# Patient Record
Sex: MALE | Race: WHITE | NOT HISPANIC OR LATINO | Employment: OTHER | URBAN - METROPOLITAN AREA
[De-identification: names, ages, dates, MRNs, and addresses within clinical notes are randomized per-mention and may not be internally consistent; named-entity substitution may affect disease eponyms.]

---

## 2017-03-10 DIAGNOSIS — E11.9 TYPE 2 DIABETES MELLITUS WITHOUT COMPLICATION: ICD-10-CM

## 2017-04-20 ENCOUNTER — PATIENT OUTREACH (OUTPATIENT)
Dept: ADMINISTRATIVE | Facility: HOSPITAL | Age: 72
End: 2017-04-20

## 2017-04-20 NOTE — PROGRESS NOTES
SCHEDULED ANNUAL EXAM  WITH DR MENDOZA 06/5/17 9:30.LAB ORDERS INPLACE. PT VERBALIZED UNDERSTANDING.

## 2017-04-25 ENCOUNTER — OFFICE VISIT (OUTPATIENT)
Dept: INTERNAL MEDICINE | Facility: CLINIC | Age: 72
End: 2017-04-25
Payer: MEDICARE

## 2017-04-25 VITALS
SYSTOLIC BLOOD PRESSURE: 119 MMHG | WEIGHT: 288.13 LBS | OXYGEN SATURATION: 96 % | HEIGHT: 69 IN | HEART RATE: 88 BPM | DIASTOLIC BLOOD PRESSURE: 70 MMHG | BODY MASS INDEX: 42.67 KG/M2 | TEMPERATURE: 98 F

## 2017-04-25 DIAGNOSIS — J06.9 UPPER RESPIRATORY TRACT INFECTION, UNSPECIFIED TYPE: Primary | ICD-10-CM

## 2017-04-25 PROCEDURE — 99213 OFFICE O/P EST LOW 20 MIN: CPT | Mod: S$PBB,,, | Performed by: FAMILY MEDICINE

## 2017-04-25 PROCEDURE — 99999 PR PBB SHADOW E&M-EST. PATIENT-LVL III: CPT | Mod: PBBFAC,,, | Performed by: FAMILY MEDICINE

## 2017-04-25 PROCEDURE — 99213 OFFICE O/P EST LOW 20 MIN: CPT | Mod: PBBFAC,PO | Performed by: FAMILY MEDICINE

## 2017-04-25 RX ORDER — PROMETHAZINE HYDROCHLORIDE AND CODEINE PHOSPHATE 6.25; 1 MG/5ML; MG/5ML
5 SOLUTION ORAL EVERY 4 HOURS PRN
Qty: 180 ML | Refills: 0 | Status: SHIPPED | OUTPATIENT
Start: 2017-04-25 | End: 2017-05-05

## 2017-04-25 NOTE — MR AVS SNAPSHOT
[facility]  [address] [address]  Phone: [phone]  Fax: [phone]                  Trip Prasad   2017 8:30 AM   Office Visit    Description:  Male : 1945   Provider:  Jose J Scott MD   Department:  [facility]           Reason for Visit     Nasal Congestion     Otalgia           Diagnoses this Visit        Comments    Upper respiratory tract infection, unspecified type    -  Primary            To Do List           Future Appointments        Provider Department Dept Phone    2017 9:30 AM Jose J Scott MD [facility] [phone]      Goals (5 Years of Data)     None       These Medications        Disp Refills Start End    promethazine-codeine 6.25-10 mg/5 ml (PHENERGAN WITH CODEINE) 6.25-10 mg/5 mL syrup 180 mL 0 2017    Take 5 mLs by mouth every 4 (four) hours as needed for Cough. - Oral    Pharmacy: [id number] [address] #: [phone]         [facility]     [facility] On Call Nurse Care Line -  Assistance  Unless otherwise directed by your provider, please contact [facility], our nurse care line that is available for  assistance.     Registered nurses in the [facility] provide: appointment scheduling, clinical advisement, health education, and other advisory services.  Call: [phone] (toll free)               Medications           Message regarding Medications     Verify the changes and/or additions to your medication regime listed below are the same as discussed with your clinician today.  If any of these changes or additions are incorrect, please notify your healthcare provider.        START taking these NEW medications        Refills    promethazine-codeine 6.25-10 mg/5 ml (PHENERGAN WITH CODEINE) 6.25-10 mg/5 mL syrup 0    Sig: Take 5 mLs by mouth every 4 (four) hours as needed for Cough.    Class: Print    Route: Oral     "  STOP taking these medications     albuterol 90 mcg/actuation inhaler Inhale 2 puffs into the lungs every 6 (six) hours as needed for Wheezing.    hydrocodone-acetaminophen 10-325mg (NORCO)  mg Tab Take 1 tablet by mouth as needed.    senna (SENOKOT) 8.6 mg tablet Take 1 tablet by mouth as needed for Constipation.           Verify that the below list of medications is an accurate representation of the medications you are currently taking.  If none reported, the list may be blank. If incorrect, please contact your healthcare provider. Carry this list with you in case of emergency.           Current Medications     aspirin (ECOTRIN) 81 MG EC tablet Take 81 mg by mouth once daily.    blood sugar diagnostic Strp 1 strip by Misc.(Non-Drug; Combo Route) route once daily.    brinzolamide-brimonidine 1-0.2 % DrpS Apply to eye.    promethazine-codeine 6.25-10 mg/5 ml (PHENERGAN WITH CODEINE) 6.25-10 mg/5 mL syrup Take 5 mLs by mouth every 4 (four) hours as needed for Cough.    triamcinolone acetonide 0.1% (KENALOG) 0.1 % cream Apply topically 2 (two) times daily.           Clinical Reference Information           Your Vitals Were     BP Pulse Temp Height Weight SpO2    119/70 88 97.9 °F (36.6 °C) (Oral) 5' 9" (1.753 m) 130.7 kg (288 lb 2.3 oz) 96%    BMI                42.55 kg/m2          Blood Pressure          Most Recent Value    BP  119/70      Allergies as of 4/25/2017     Biaxin [Clarithromycin]    Pcn [Penicillins]    Metformin      Immunizations Administered on Date of Encounter - 4/25/2017     None      [facility] Sign-Up     Activating your [facility] account is as easy as 1-2-3!     1) Visit [email or web address], select Sign Up Now, enter this activation code and your date of birth, then select Next.  [email or web address]  Expires: 6/9/2017  9:21 AM      2) Create a username and password to use when you visit [facility] in the future and select a security question in case you lose your password and select Next.    3) " Enter your e-mail address and click Sign Up!    Additional Information  If you have questions, please e-mail [email or web address] or call [phone] to talk to our [facility] staff. Remember, [facility] is NOT to be used for urgent needs. For medical emergencies, dial 911.         Language Assistance Services     ATTENTION: Language assistance services are available, free of charge. Please call [phone].      ATENCIÓN: Si habla español, tiene a thornton disposición servicios gratuitos de asistencia lingüística. Llame al [phone].     [facility] Ý: N?u b?n nói Ti?ng Vi?t, có các d?ch v? h? tr? ngôn ng? mi?n phí dành cho b?n. G?i s? [phone].         [facility] complies with applicable Federal civil rights laws and does not discriminate on the basis of race, color, national origin, age, disability, or sex.

## 2017-04-25 NOTE — PROGRESS NOTES
Subjective:       Patient ID: Trip Prasad is a 71 y.o. male.    Chief Complaint: Nasal Congestion and Otalgia    HPI Comments: 4 days duration of upper respiratory infection to include nasal congestion postnasal drip and nonproductive cough.  Denies fever chills.  Denies sinus pressure.    Otalgia    Associated symptoms include coughing. Pertinent negatives include no diarrhea or sore throat.     Review of Systems   Constitutional: Negative for chills and fever.   HENT: Positive for congestion, ear pain and postnasal drip. Negative for sinus pressure and sore throat.    Respiratory: Positive for cough. Negative for shortness of breath and wheezing.    Cardiovascular: Negative for chest pain and palpitations.   Gastrointestinal: Negative for diarrhea and nausea.       Objective:      Physical Exam   Constitutional: He appears well-developed and well-nourished.   HENT:   Right Ear: External ear normal.   Left Ear: External ear normal.   Nasal congestion   Eyes: Conjunctivae are normal.   Neck: Neck supple. No thyromegaly present.   Cardiovascular: Normal rate, regular rhythm and normal heart sounds.    No murmur heard.  Pulmonary/Chest: Effort normal and breath sounds normal. No respiratory distress. He has no wheezes. He has no rales.   Abdominal: Soft. Bowel sounds are normal. He exhibits no mass. There is no tenderness.   Lymphadenopathy:     He has no cervical adenopathy.   Neurological: He is alert.       Office Visit on 09/12/2016   Component Date Value Ref Range Status    POC Glucose 09/12/2016 140* 70 - 110 mg/dL Final     Assessment:       1. Upper respiratory tract infection, unspecified type        Plan:     Promethazine with codeine, fluids and rest.    Upper respiratory tract infection, unspecified type  -     promethazine-codeine 6.25-10 mg/5 ml (PHENERGAN WITH CODEINE) 6.25-10 mg/5 mL syrup; Take 5 mLs by mouth every 4 (four) hours as needed for Cough.  Dispense: 180 mL; Refill: 0

## 2017-11-17 DIAGNOSIS — E11.9 TYPE 2 DIABETES MELLITUS WITHOUT COMPLICATION: ICD-10-CM

## 2018-01-10 ENCOUNTER — HOSPITAL ENCOUNTER (INPATIENT)
Facility: HOSPITAL | Age: 73
LOS: 3 days | Discharge: HOME | DRG: 193 | End: 2018-01-14
Attending: EMERGENCY MEDICINE | Admitting: EMERGENCY MEDICINE
Payer: MEDICARE

## 2018-01-10 DIAGNOSIS — R06.02 SOB (SHORTNESS OF BREATH): ICD-10-CM

## 2018-01-10 DIAGNOSIS — I48.91 A-FIB: ICD-10-CM

## 2018-01-10 DIAGNOSIS — J18.9 PNEUMONIA OF LEFT LOWER LOBE DUE TO INFECTIOUS ORGANISM: Primary | ICD-10-CM

## 2018-01-10 DIAGNOSIS — I95.9 HYPOTENSION, UNSPECIFIED HYPOTENSION TYPE: ICD-10-CM

## 2018-01-10 DIAGNOSIS — R06.09 DOE (DYSPNEA ON EXERTION): ICD-10-CM

## 2018-01-10 DIAGNOSIS — I48.91 NEW ONSET A-FIB: ICD-10-CM

## 2018-01-10 DIAGNOSIS — I48.0 PAROXYSMAL ATRIAL FIBRILLATION: ICD-10-CM

## 2018-01-10 PROBLEM — J11.1 INFLUENZA: Status: ACTIVE | Noted: 2018-01-10

## 2018-01-10 LAB
ALBUMIN SERPL BCP-MCNC: 3.2 G/DL
ALLENS TEST: ABNORMAL
ALP SERPL-CCNC: 86 U/L
ALT SERPL W/O P-5'-P-CCNC: 34 U/L
ANION GAP SERPL CALC-SCNC: 12 MMOL/L
AST SERPL-CCNC: 35 U/L
BACTERIA #/AREA URNS HPF: NORMAL /HPF
BASOPHILS # BLD AUTO: 0.01 K/UL
BASOPHILS NFR BLD: 0.2 %
BILIRUB SERPL-MCNC: 0.6 MG/DL
BILIRUB UR QL STRIP: NEGATIVE
BNP SERPL-MCNC: <10 PG/ML
BUN SERPL-MCNC: 19 MG/DL
CALCIUM SERPL-MCNC: 8.9 MG/DL
CHLORIDE SERPL-SCNC: 100 MMOL/L
CK SERPL-CCNC: 184 U/L
CLARITY UR: CLEAR
CO2 SERPL-SCNC: 25 MMOL/L
COLOR UR: YELLOW
CREAT SERPL-MCNC: 1.3 MG/DL
DELSYS: ABNORMAL
DIFFERENTIAL METHOD: ABNORMAL
EOSINOPHIL # BLD AUTO: 0 K/UL
EOSINOPHIL NFR BLD: 0.2 %
ERYTHROCYTE [DISTWIDTH] IN BLOOD BY AUTOMATED COUNT: 14.6 %
EST. GFR  (AFRICAN AMERICAN): >60 ML/MIN/1.73 M^2
EST. GFR  (NON AFRICAN AMERICAN): 55 ML/MIN/1.73 M^2
FIO2: 21
GLUCOSE SERPL-MCNC: 204 MG/DL
GLUCOSE UR QL STRIP: NEGATIVE
HCO3 UR-SCNC: 22.8 MMOL/L (ref 24–28)
HCT VFR BLD AUTO: 44.2 %
HGB BLD-MCNC: 14.7 G/DL
HGB UR QL STRIP: ABNORMAL
HYALINE CASTS #/AREA URNS LPF: 0 /LPF
KETONES UR QL STRIP: NEGATIVE
LACTATE SERPL-SCNC: 1 MMOL/L
LACTATE SERPL-SCNC: 1.3 MMOL/L
LEUKOCYTE ESTERASE UR QL STRIP: NEGATIVE
LYMPHOCYTES # BLD AUTO: 1.8 K/UL
LYMPHOCYTES NFR BLD: 26.9 %
MCH RBC QN AUTO: 28.8 PG
MCHC RBC AUTO-ENTMCNC: 33.3 G/DL
MCV RBC AUTO: 87 FL
MICROSCOPIC COMMENT: NORMAL
MODE: ABNORMAL
MONOCYTES # BLD AUTO: 0.5 K/UL
MONOCYTES NFR BLD: 8.2 %
NEUTROPHILS # BLD AUTO: 4.2 K/UL
NEUTROPHILS NFR BLD: 64.5 %
NITRITE UR QL STRIP: NEGATIVE
PCO2 BLDA: 34 MMHG (ref 35–45)
PH SMN: 7.43 [PH] (ref 7.35–7.45)
PH UR STRIP: 6 [PH] (ref 5–8)
PLATELET # BLD AUTO: 140 K/UL
PMV BLD AUTO: 9.8 FL
PO2 BLDA: 65 MMHG (ref 80–100)
POC BE: -1 MMOL/L
POC SATURATED O2: 93 % (ref 95–100)
POCT GLUCOSE: 171 MG/DL (ref 70–110)
POTASSIUM SERPL-SCNC: 3.5 MMOL/L
PROT SERPL-MCNC: 7.2 G/DL
PROT UR QL STRIP: ABNORMAL
RBC # BLD AUTO: 5.11 M/UL
RBC #/AREA URNS HPF: 0 /HPF (ref 0–4)
SAMPLE: ABNORMAL
SITE: ABNORMAL
SODIUM SERPL-SCNC: 137 MMOL/L
SP GR UR STRIP: 1.02 (ref 1–1.03)
TROPONIN I SERPL DL<=0.01 NG/ML-MCNC: <0.006 NG/ML
URN SPEC COLLECT METH UR: ABNORMAL
UROBILINOGEN UR STRIP-ACNC: NEGATIVE EU/DL
WBC # BLD AUTO: 6.5 K/UL
WBC #/AREA URNS HPF: 2 /HPF (ref 0–5)

## 2018-01-10 PROCEDURE — 25000003 PHARM REV CODE 250: Performed by: EMERGENCY MEDICINE

## 2018-01-10 PROCEDURE — G0378 HOSPITAL OBSERVATION PER HR: HCPCS

## 2018-01-10 PROCEDURE — 83880 ASSAY OF NATRIURETIC PEPTIDE: CPT

## 2018-01-10 PROCEDURE — 84484 ASSAY OF TROPONIN QUANT: CPT

## 2018-01-10 PROCEDURE — 93010 ELECTROCARDIOGRAM REPORT: CPT | Mod: ,,, | Performed by: INTERNAL MEDICINE

## 2018-01-10 PROCEDURE — 25000003 PHARM REV CODE 250: Performed by: HOSPITALIST

## 2018-01-10 PROCEDURE — 63600175 PHARM REV CODE 636 W HCPCS: Performed by: FAMILY MEDICINE

## 2018-01-10 PROCEDURE — 82962 GLUCOSE BLOOD TEST: CPT

## 2018-01-10 PROCEDURE — 99285 EMERGENCY DEPT VISIT HI MDM: CPT

## 2018-01-10 PROCEDURE — 96365 THER/PROPH/DIAG IV INF INIT: CPT

## 2018-01-10 PROCEDURE — 93005 ELECTROCARDIOGRAM TRACING: CPT

## 2018-01-10 PROCEDURE — 83605 ASSAY OF LACTIC ACID: CPT | Mod: 91

## 2018-01-10 PROCEDURE — 63600175 PHARM REV CODE 636 W HCPCS: Performed by: EMERGENCY MEDICINE

## 2018-01-10 PROCEDURE — 25000242 PHARM REV CODE 250 ALT 637 W/ HCPCS: Performed by: EMERGENCY MEDICINE

## 2018-01-10 PROCEDURE — 25000242 PHARM REV CODE 250 ALT 637 W/ HCPCS: Performed by: HOSPITALIST

## 2018-01-10 PROCEDURE — 36600 WITHDRAWAL OF ARTERIAL BLOOD: CPT

## 2018-01-10 PROCEDURE — 82803 BLOOD GASES ANY COMBINATION: CPT

## 2018-01-10 PROCEDURE — 99900035 HC TECH TIME PER 15 MIN (STAT)

## 2018-01-10 PROCEDURE — 82550 ASSAY OF CK (CPK): CPT

## 2018-01-10 PROCEDURE — 27000221 HC OXYGEN, UP TO 24 HOURS

## 2018-01-10 PROCEDURE — 85025 COMPLETE CBC W/AUTO DIFF WBC: CPT

## 2018-01-10 PROCEDURE — 94640 AIRWAY INHALATION TREATMENT: CPT

## 2018-01-10 PROCEDURE — 80053 COMPREHEN METABOLIC PANEL: CPT

## 2018-01-10 PROCEDURE — 81000 URINALYSIS NONAUTO W/SCOPE: CPT

## 2018-01-10 PROCEDURE — 87040 BLOOD CULTURE FOR BACTERIA: CPT | Mod: 59

## 2018-01-10 PROCEDURE — 83605 ASSAY OF LACTIC ACID: CPT

## 2018-01-10 PROCEDURE — 36415 COLL VENOUS BLD VENIPUNCTURE: CPT

## 2018-01-10 PROCEDURE — 25000003 PHARM REV CODE 250: Performed by: FAMILY MEDICINE

## 2018-01-10 RX ORDER — IBUPROFEN 200 MG
16 TABLET ORAL
Status: DISCONTINUED | OUTPATIENT
Start: 2018-01-10 | End: 2018-01-14 | Stop reason: HOSPADM

## 2018-01-10 RX ORDER — IPRATROPIUM BROMIDE AND ALBUTEROL SULFATE 2.5; .5 MG/3ML; MG/3ML
3 SOLUTION RESPIRATORY (INHALATION)
Status: COMPLETED | OUTPATIENT
Start: 2018-01-10 | End: 2018-01-10

## 2018-01-10 RX ORDER — ONDANSETRON 2 MG/ML
4 INJECTION INTRAMUSCULAR; INTRAVENOUS EVERY 12 HOURS PRN
Status: DISCONTINUED | OUTPATIENT
Start: 2018-01-10 | End: 2018-01-14 | Stop reason: HOSPADM

## 2018-01-10 RX ORDER — SODIUM CHLORIDE 9 MG/ML
INJECTION, SOLUTION INTRAVENOUS CONTINUOUS
Status: DISCONTINUED | OUTPATIENT
Start: 2018-01-10 | End: 2018-01-11

## 2018-01-10 RX ORDER — IBUPROFEN 200 MG
24 TABLET ORAL
Status: DISCONTINUED | OUTPATIENT
Start: 2018-01-10 | End: 2018-01-14 | Stop reason: HOSPADM

## 2018-01-10 RX ORDER — MOXIFLOXACIN HYDROCHLORIDE 400 MG/250ML
400 INJECTION, SOLUTION INTRAVENOUS
Status: COMPLETED | OUTPATIENT
Start: 2018-01-10 | End: 2018-01-10

## 2018-01-10 RX ORDER — ACETAMINOPHEN 325 MG/1
650 TABLET ORAL EVERY 8 HOURS PRN
Status: DISCONTINUED | OUTPATIENT
Start: 2018-01-10 | End: 2018-01-14 | Stop reason: HOSPADM

## 2018-01-10 RX ORDER — IPRATROPIUM BROMIDE AND ALBUTEROL SULFATE 2.5; .5 MG/3ML; MG/3ML
3 SOLUTION RESPIRATORY (INHALATION) EVERY 4 HOURS PRN
Status: DISCONTINUED | OUTPATIENT
Start: 2018-01-10 | End: 2018-01-11

## 2018-01-10 RX ORDER — MOXIFLOXACIN HYDROCHLORIDE 400 MG/1
400 TABLET ORAL DAILY
Status: DISCONTINUED | OUTPATIENT
Start: 2018-01-11 | End: 2018-01-10

## 2018-01-10 RX ORDER — OSELTAMIVIR PHOSPHATE 30 MG/1
75 CAPSULE ORAL 2 TIMES DAILY
Status: ON HOLD | COMMUNITY
End: 2018-01-14 | Stop reason: HOSPADM

## 2018-01-10 RX ORDER — CEFTRIAXONE 1 G/1
1 INJECTION, POWDER, FOR SOLUTION INTRAMUSCULAR; INTRAVENOUS
Status: DISCONTINUED | OUTPATIENT
Start: 2018-01-10 | End: 2018-01-10 | Stop reason: SDUPTHER

## 2018-01-10 RX ORDER — GLUCAGON 1 MG
1 KIT INJECTION
Status: DISCONTINUED | OUTPATIENT
Start: 2018-01-10 | End: 2018-01-14 | Stop reason: HOSPADM

## 2018-01-10 RX ORDER — OSELTAMIVIR PHOSPHATE 75 MG/1
75 CAPSULE ORAL 2 TIMES DAILY
Status: COMPLETED | OUTPATIENT
Start: 2018-01-10 | End: 2018-01-14

## 2018-01-10 RX ORDER — INSULIN ASPART 100 [IU]/ML
0-5 INJECTION, SOLUTION INTRAVENOUS; SUBCUTANEOUS
Status: DISCONTINUED | OUTPATIENT
Start: 2018-01-10 | End: 2018-01-12

## 2018-01-10 RX ORDER — DOXYCYCLINE HYCLATE 100 MG
100 TABLET ORAL EVERY 12 HOURS
Status: DISCONTINUED | OUTPATIENT
Start: 2018-01-10 | End: 2018-01-13

## 2018-01-10 RX ORDER — MONTELUKAST SODIUM 10 MG/1
10 TABLET ORAL NIGHTLY
COMMUNITY
End: 2018-02-22

## 2018-01-10 RX ADMIN — OSELTAMIVIR PHOSPHATE 75 MG: 75 CAPSULE ORAL at 09:01

## 2018-01-10 RX ADMIN — MOXIFLOXACIN HYDROCHLORIDE 400 MG: 400 INJECTION, SOLUTION INTRAVENOUS at 05:01

## 2018-01-10 RX ADMIN — IPRATROPIUM BROMIDE AND ALBUTEROL SULFATE 3 ML: .5; 3 SOLUTION RESPIRATORY (INHALATION) at 09:01

## 2018-01-10 RX ADMIN — SODIUM CHLORIDE 3918 ML: 0.9 INJECTION, SOLUTION INTRAVENOUS at 03:01

## 2018-01-10 RX ADMIN — SODIUM CHLORIDE: 0.9 INJECTION, SOLUTION INTRAVENOUS at 09:01

## 2018-01-10 RX ADMIN — IPRATROPIUM BROMIDE AND ALBUTEROL SULFATE 3 ML: .5; 3 SOLUTION RESPIRATORY (INHALATION) at 03:01

## 2018-01-10 RX ADMIN — DOXYCYCLINE HYCLATE 100 MG: 100 TABLET, COATED ORAL at 09:01

## 2018-01-10 RX ADMIN — CEFTRIAXONE 1 G: 1 INJECTION, SOLUTION INTRAVENOUS at 09:01

## 2018-01-10 NOTE — ED PROVIDER NOTES
SCRIBE #1 NOTE: I, Breezy Adler, am scribing for, and in the presence of, Eduardo Potter MD. I have scribed the entire note.      History      Chief Complaint   Patient presents with    Shortness of Breath     dx flu 2 days ago. Reports fatigue, cough, congestion.        Review of patient's allergies indicates:   Allergen Reactions    Biaxin [clarithromycin] Other (See Comments)     Pt unsure     Pcn [penicillins]     Metformin Rash     Rash over body         HPI   HPI    1/10/2018, 2:48 PM   History obtained from the patient      History of Present Illness: Trip Prasad is a 72 y.o. male patient who presents to the Emergency Department for SOB which onset gradually 3 weeks ago. Symptoms are constant and moderate in severity. Pt was dx with bronchitis 3 weeks ago. Pt was prescribed an inhaler and nebulizer to help with the wheezing. The wheezing started to improved but never completely resolved. Pt was dx with flu yesterday by his PCP. No mitigating or exacerbating factors reported. Associated sxs include fatigue, cough, and congestion. Patient denies any fever, chills, n/v/d, CP, leg swelling, palpitations, sore throat, rhinorrhea, HA, and all other sxs at this time. No further complaints or concerns at this time.         Arrival mode: Personal vehicle     PCP: Caleb Leong MD       Past Medical History:  Past Medical History:   Diagnosis Date    Diabetes mellitus, type 2 1/2014    Glaucoma 2012    Hiatal hernia     Hyperlipidemia     Loss of hearing     Hearing aids 6/12 bilateral    Obesity (BMI 30-39.9)        Past Surgical History:  Past Surgical History:   Procedure Laterality Date    EYE SURGERY Bilateral 2013    Cataract    GLAUCOMA SURGERY  10/12    ROTATOR CUFF REPAIR Right 3/04    TONSILLECTOMY      TOTAL KNEE ARTHROPLASTY Left     Total right knee replacement Right 11/19/15    WISDOM TOOTH EXTRACTION           Family History:  Family History   Problem Relation Age of Onset     "Cancer Maternal Aunt     Heart disease Maternal Aunt     Heart disease Maternal Grandfather     Cancer Maternal Uncle        Social History:  Social History     Social History Main Topics    Smoking status: Never Smoker    Smokeless tobacco: Never Used    Alcohol use 0.0 oz/week      Comment: " very little"    Drug use: No    Sexual activity: Yes     Partners: Female       ROS   Review of Systems   Constitutional: Positive for fatigue. Negative for chills and fever.   HENT: Positive for congestion. Negative for rhinorrhea and sore throat.    Respiratory: Positive for cough, shortness of breath and wheezing.    Cardiovascular: Negative for chest pain, palpitations and leg swelling.   Gastrointestinal: Negative for diarrhea, nausea and vomiting.   Genitourinary: Negative for dysuria.   Musculoskeletal: Negative for back pain.   Skin: Negative for rash.   Neurological: Negative for dizziness, weakness, light-headedness and headaches.   Hematological: Does not bruise/bleed easily.       Physical Exam      Initial Vitals [01/10/18 1427]   BP Pulse Resp Temp SpO2   (!) 97/55 110 18 98.3 °F (36.8 °C) (!) 92 %      MAP       69          Physical Exam  Nursing Notes and Vital Signs Reviewed.  Constitutional: Patient is in no acute distress. Well-developed and well-nourished.  Head: Atraumatic. Normocephalic.  Eyes: PERRL. EOM intact. Conjunctivae are not pale. No scleral icterus.  ENT: Mucous membranes are moist. Oropharynx is clear and symmetric.    Neck: Supple. Full ROM. No lymphadenopathy.  Cardiovascular: Regular rate. Regular rhythm. No murmurs, rubs, or gallops. Distal pulses are 2+ and symmetric.  Pulmonary/Chest: No respiratory distress. Coarse breath sounds bilaterally. Diffuse wheezing.  Abdominal: Soft and non-distended.  There is no tenderness.  No rebound, guarding, or rigidity.  Musculoskeletal: Moves all extremities. No obvious deformities. No edema.   Skin: Warm and dry.  Neurological:  Alert, awake, " "and appropriate.  Normal speech.  No acute focal neurological deficits are appreciated.  Psychiatric: Normal affect. Good eye contact. Appropriate in content.    ED Course    Procedures  ED Vital Signs:  Vitals:    01/10/18 1427 01/10/18 1520 01/10/18 1601 01/10/18 1631   BP: (!) 97/55  (!) 144/68 (!) 182/90   Pulse: 110 94 94 93   Resp: 18 (!) 22 (!) 24 (!) 25   Temp: 98.3 °F (36.8 °C)      TempSrc: Oral      SpO2: (!) 92% 97% (!) 91% 96%   Weight: 130.6 kg (288 lb)      Height: 5' 10" (1.778 m)          Abnormal Lab Results:  Labs Reviewed   CBC W/ AUTO DIFFERENTIAL - Abnormal; Notable for the following:        Result Value    RDW 14.6 (*)     Platelets 140 (*)     All other components within normal limits   COMPREHENSIVE METABOLIC PANEL - Abnormal; Notable for the following:     Glucose 204 (*)     Albumin 3.2 (*)     eGFR if non  55 (*)     All other components within normal limits   ISTAT PROCEDURE - Abnormal; Notable for the following:     POC PCO2 34.0 (*)     POC PO2 65 (*)     POC HCO3 22.8 (*)     POC SATURATED O2 93 (*)     All other components within normal limits   CULTURE, BLOOD   CULTURE, BLOOD   B-TYPE NATRIURETIC PEPTIDE   CK   TROPONIN I   LACTIC ACID, PLASMA   URINALYSIS   LACTIC ACID, PLASMA        All Lab Results:  Results for orders placed or performed during the hospital encounter of 01/10/18   CBC auto differential   Result Value Ref Range    WBC 6.50 3.90 - 12.70 K/uL    RBC 5.11 4.60 - 6.20 M/uL    Hemoglobin 14.7 14.0 - 18.0 g/dL    Hematocrit 44.2 40.0 - 54.0 %    MCV 87 82 - 98 fL    MCH 28.8 27.0 - 31.0 pg    MCHC 33.3 32.0 - 36.0 g/dL    RDW 14.6 (H) 11.5 - 14.5 %    Platelets 140 (L) 150 - 350 K/uL    MPV 9.8 9.2 - 12.9 fL    Gran # 4.2 1.8 - 7.7 K/uL    Lymph # 1.8 1.0 - 4.8 K/uL    Mono # 0.5 0.3 - 1.0 K/uL    Eos # 0.0 0.0 - 0.5 K/uL    Baso # 0.01 0.00 - 0.20 K/uL    Gran% 64.5 38.0 - 73.0 %    Lymph% 26.9 18.0 - 48.0 %    Mono% 8.2 4.0 - 15.0 %    Eosinophil% 0.2 " 0.0 - 8.0 %    Basophil% 0.2 0.0 - 1.9 %    Differential Method Automated    Comprehensive metabolic panel   Result Value Ref Range    Sodium 137 136 - 145 mmol/L    Potassium 3.5 3.5 - 5.1 mmol/L    Chloride 100 95 - 110 mmol/L    CO2 25 23 - 29 mmol/L    Glucose 204 (H) 70 - 110 mg/dL    BUN, Bld 19 8 - 23 mg/dL    Creatinine 1.3 0.5 - 1.4 mg/dL    Calcium 8.9 8.7 - 10.5 mg/dL    Total Protein 7.2 6.0 - 8.4 g/dL    Albumin 3.2 (L) 3.5 - 5.2 g/dL    Total Bilirubin 0.6 0.1 - 1.0 mg/dL    Alkaline Phosphatase 86 55 - 135 U/L    AST 35 10 - 40 U/L    ALT 34 10 - 44 U/L    Anion Gap 12 8 - 16 mmol/L    eGFR if African American >60 >60 mL/min/1.73 m^2    eGFR if non African American 55 (A) >60 mL/min/1.73 m^2   Brain natriuretic peptide   Result Value Ref Range    BNP <10 0 - 99 pg/mL   CK   Result Value Ref Range     20 - 200 U/L   Troponin I   Result Value Ref Range    Troponin I <0.006 0.000 - 0.026 ng/mL   Lactic acid, plasma #1   Result Value Ref Range    Lactate (Lactic Acid) 1.3 0.5 - 2.2 mmol/L   ISTAT PROCEDURE   Result Value Ref Range    POC PH 7.435 7.35 - 7.45    POC PCO2 34.0 (L) 35 - 45 mmHg    POC PO2 65 (L) 80 - 100 mmHg    POC HCO3 22.8 (L) 24 - 28 mmol/L    POC BE -1 -2 to 2 mmol/L    POC SATURATED O2 93 (L) 95 - 100 %    Sample ARTERIAL     Site RR     Allens Test Pass     DelSys Room Air     Mode SPONT     FiO2 21          Imaging Results:  Imaging Results          X-Ray Chest AP Portable (Final result)  Result time 01/10/18 15:13:25    Final result by Germaine Malcolm MD (01/10/18 15:13:25)                 Impression:      Probable left lung base consolidation.      Electronically signed by: GERMAINE MALCOLM MD  Date:     01/10/18  Time:    15:13              Narrative:    Exam: XR CHEST AP PORTABLE,    Date:  01/10/18 15:08:07    History: sob    Comparison:  09/12/2016    Findings: Cardiac size is normal.  There is elevation of the right diaphragm.  The right lung is clear.    Loss of  definition of the left diaphragm with blunting of the left costophrenic angle.  Probable left lung base consolidation.                             The EKG was ordered, reviewed, and independently interpreted by the ED provider.  Interpretation time: 15:02  Rate: 99 BPM  Rhythm: normal sinus rhythm  Interpretation: Low voltage QRS. Septal infarct. No STEMI.             The Emergency Provider reviewed the vital signs and test results, which are outlined above.    ED Discussion     3:39 PM: Re-evaluated pt. Pt is resting comfortably and is in no acute distress.  D/w pt all pertinent results. D/w pt any concerns expressed at this time. Answered all questions. Pt expresses understanding at this time.    5:00 PM: Discussed case with DAVID Paulino ([facility]). Dr. Wheat agrees with current care and management of pt and accepts admission.   Admitting Service: [facility] medicine   Admitting Physician: Dr. Wheat  Admit to: Telemetry    5:02 PM: Re-evaluated pt. I have discussed test results, shared treatment plan, and the need for admission with patient and family at bedside. Pt and family express understanding at this time and agree with all information. All questions answered. Pt and family have no further questions or concerns at this time. Pt is ready for admit.          ED Medication(s):  Medications   moxifloxacin 400 mg/250 mL IVPB 400 mg (not administered)   moxifloxacin tablet 400 mg (not administered)   ondansetron injection 4 mg (not administered)   promethazine (PHENERGAN) 6.25 mg in dextrose 5 % 50 mL IVPB (not administered)   acetaminophen tablet 650 mg (not administered)   0.9%  NaCl infusion (not administered)   albuterol-ipratropium 2.5mg-0.5mg/3mL nebulizer solution 3 mL (3 mLs Nebulization Given 1/10/18 1520)   sodium chloride 0.9% bolus 3,918 mL (3,918 mLs Intravenous New Bag 1/10/18 8091)       New Prescriptions    No medications on file             Medical Decision Making    Medical Decision  Making:   Clinical Tests:   Lab Tests: Ordered and Reviewed  Radiological Study: Ordered and Reviewed  Medical Tests: Ordered and Reviewed           Scribe Attestation:   Scribe #1: I performed the above scribed service and the documentation accurately describes the services I performed. I attest to the accuracy of the note.    Attending:   Physician Attestation Statement for Scribe #1: I, Eduardo Potter MD, personally performed the services described in this documentation, as scribed by Breezy Adler, in my presence, and it is both accurate and complete.          Clinical Impression       ICD-10-CM ICD-9-CM   1. Pneumonia of left lower lobe due to infectious organism J18.1 486   2. SOB (shortness of breath) R06.02 786.05   3. Hypotension, unspecified hypotension type I95.9 458.9       Disposition:   Disposition: Placed in Observation  Condition: Fair         Eduardo Potter MD  01/10/18 1162

## 2018-01-10 NOTE — HPI
Trip Prasad is a 72 y.o. male patient who was diagnosed and tx for bronchitis x 3 weeks ago (prescribed inhaler and nebulizer for wheezing) with recent diagnosis of influenza per PCP x 3 days ago ( started on Tamiflu) who presented to the Emergency Department with complaints of worsening SOB which onset gradually 3 weeks ago. Symptoms are constant and moderate in severity. No mitigating or exacerbating factors reported. Associated sxs include fatigue, cough, and congestion. Patient denies any fever, chills, diaphoresis, night sweats, headache, chest pain, palpitations, leg swelling, sore throat, rhinorrhea. Initial workup revealed abg with hypoxia. CXR with left lung base pneumonia. No leukocytosis, afebrile. Hospital Medicine consulted for admission.

## 2018-01-11 PROBLEM — J98.6 ELEVATED DIAPHRAGM: Status: ACTIVE | Noted: 2018-01-11

## 2018-01-11 PROBLEM — R06.2 WHEEZING WITHOUT DIAGNOSIS OF ASTHMA: Status: ACTIVE | Noted: 2018-01-11

## 2018-01-11 LAB
ANION GAP SERPL CALC-SCNC: 9 MMOL/L
BASOPHILS # BLD AUTO: 0.01 K/UL
BASOPHILS NFR BLD: 0.2 %
BUN SERPL-MCNC: 11 MG/DL
CALCIUM SERPL-MCNC: 8 MG/DL
CHLORIDE SERPL-SCNC: 107 MMOL/L
CO2 SERPL-SCNC: 21 MMOL/L
CREAT SERPL-MCNC: 0.9 MG/DL
DIFFERENTIAL METHOD: ABNORMAL
EOSINOPHIL # BLD AUTO: 0 K/UL
EOSINOPHIL NFR BLD: 0 %
ERYTHROCYTE [DISTWIDTH] IN BLOOD BY AUTOMATED COUNT: 14.8 %
EST. GFR  (AFRICAN AMERICAN): >60 ML/MIN/1.73 M^2
EST. GFR  (NON AFRICAN AMERICAN): >60 ML/MIN/1.73 M^2
ESTIMATED AVG GLUCOSE: 186 MG/DL
GLUCOSE SERPL-MCNC: 171 MG/DL
HBA1C MFR BLD HPLC: 8.1 %
HCT VFR BLD AUTO: 39.9 %
HGB BLD-MCNC: 12.9 G/DL
LACTATE SERPL-SCNC: 0.9 MMOL/L
LYMPHOCYTES # BLD AUTO: 1.9 K/UL
LYMPHOCYTES NFR BLD: 38.4 %
MCH RBC QN AUTO: 28.4 PG
MCHC RBC AUTO-ENTMCNC: 32.3 G/DL
MCV RBC AUTO: 88 FL
MONOCYTES # BLD AUTO: 0.4 K/UL
MONOCYTES NFR BLD: 8.3 %
NEUTROPHILS # BLD AUTO: 2.6 K/UL
NEUTROPHILS NFR BLD: 53.1 %
PLATELET # BLD AUTO: 124 K/UL
PMV BLD AUTO: 9.5 FL
POCT GLUCOSE: 144 MG/DL (ref 70–110)
POCT GLUCOSE: 189 MG/DL (ref 70–110)
POCT GLUCOSE: 292 MG/DL (ref 70–110)
POCT GLUCOSE: 82 MG/DL (ref 70–110)
POTASSIUM SERPL-SCNC: 3.3 MMOL/L
RBC # BLD AUTO: 4.55 M/UL
SODIUM SERPL-SCNC: 137 MMOL/L
WBC # BLD AUTO: 4.95 K/UL

## 2018-01-11 PROCEDURE — 83036 HEMOGLOBIN GLYCOSYLATED A1C: CPT

## 2018-01-11 PROCEDURE — 25000003 PHARM REV CODE 250: Performed by: NURSE PRACTITIONER

## 2018-01-11 PROCEDURE — 25000003 PHARM REV CODE 250: Performed by: EMERGENCY MEDICINE

## 2018-01-11 PROCEDURE — 87070 CULTURE OTHR SPECIMN AEROBIC: CPT

## 2018-01-11 PROCEDURE — 25000242 PHARM REV CODE 250 ALT 637 W/ HCPCS: Performed by: HOSPITALIST

## 2018-01-11 PROCEDURE — 63600175 PHARM REV CODE 636 W HCPCS: Performed by: EMERGENCY MEDICINE

## 2018-01-11 PROCEDURE — 27000221 HC OXYGEN, UP TO 24 HOURS

## 2018-01-11 PROCEDURE — 99223 1ST HOSP IP/OBS HIGH 75: CPT | Mod: ,,, | Performed by: INTERNAL MEDICINE

## 2018-01-11 PROCEDURE — 83605 ASSAY OF LACTIC ACID: CPT

## 2018-01-11 PROCEDURE — 25500020 PHARM REV CODE 255: Performed by: EMERGENCY MEDICINE

## 2018-01-11 PROCEDURE — 94761 N-INVAS EAR/PLS OXIMETRY MLT: CPT

## 2018-01-11 PROCEDURE — 63600175 PHARM REV CODE 636 W HCPCS: Performed by: INTERNAL MEDICINE

## 2018-01-11 PROCEDURE — 25000242 PHARM REV CODE 250 ALT 637 W/ HCPCS: Performed by: EMERGENCY MEDICINE

## 2018-01-11 PROCEDURE — 21400001 HC TELEMETRY ROOM

## 2018-01-11 PROCEDURE — 63600175 PHARM REV CODE 636 W HCPCS: Performed by: NURSE PRACTITIONER

## 2018-01-11 PROCEDURE — 63600175 PHARM REV CODE 636 W HCPCS: Performed by: FAMILY MEDICINE

## 2018-01-11 PROCEDURE — 85025 COMPLETE CBC W/AUTO DIFF WBC: CPT

## 2018-01-11 PROCEDURE — 25000003 PHARM REV CODE 250: Performed by: FAMILY MEDICINE

## 2018-01-11 PROCEDURE — 87205 SMEAR GRAM STAIN: CPT

## 2018-01-11 PROCEDURE — 25000003 PHARM REV CODE 250: Performed by: HOSPITALIST

## 2018-01-11 PROCEDURE — 80048 BASIC METABOLIC PNL TOTAL CA: CPT

## 2018-01-11 PROCEDURE — 36415 COLL VENOUS BLD VENIPUNCTURE: CPT

## 2018-01-11 PROCEDURE — 94640 AIRWAY INHALATION TREATMENT: CPT

## 2018-01-11 RX ORDER — IPRATROPIUM BROMIDE AND ALBUTEROL SULFATE 2.5; .5 MG/3ML; MG/3ML
3 SOLUTION RESPIRATORY (INHALATION) EVERY 4 HOURS
Status: DISCONTINUED | OUTPATIENT
Start: 2018-01-11 | End: 2018-01-13

## 2018-01-11 RX ORDER — PANTOPRAZOLE SODIUM 40 MG/1
40 TABLET, DELAYED RELEASE ORAL 2 TIMES DAILY
Status: DISCONTINUED | OUTPATIENT
Start: 2018-01-11 | End: 2018-01-14 | Stop reason: HOSPADM

## 2018-01-11 RX ORDER — POTASSIUM CHLORIDE 20 MEQ/1
40 TABLET, EXTENDED RELEASE ORAL ONCE
Status: COMPLETED | OUTPATIENT
Start: 2018-01-11 | End: 2018-01-11

## 2018-01-11 RX ORDER — HYDROCODONE BITARTRATE AND HOMATROPINE METHYLBROMIDE 1.5; 5 MG/5ML; MG/5ML
2 SYRUP ORAL EVERY 4 HOURS
Status: DISCONTINUED | OUTPATIENT
Start: 2018-01-11 | End: 2018-01-11 | Stop reason: DRUGHIGH

## 2018-01-11 RX ORDER — HYDROCODONE BITARTRATE AND HOMATROPINE METHYLBROMIDE 1.5; 5 MG/5ML; MG/5ML
2 SYRUP ORAL EVERY 4 HOURS
Status: DISCONTINUED | OUTPATIENT
Start: 2018-01-11 | End: 2018-01-14 | Stop reason: HOSPADM

## 2018-01-11 RX ORDER — ENOXAPARIN SODIUM 100 MG/ML
40 INJECTION SUBCUTANEOUS EVERY 24 HOURS
Status: DISCONTINUED | OUTPATIENT
Start: 2018-01-11 | End: 2018-01-14

## 2018-01-11 RX ADMIN — DOXYCYCLINE HYCLATE 100 MG: 100 TABLET, COATED ORAL at 09:01

## 2018-01-11 RX ADMIN — METHYLPREDNISOLONE SODIUM SUCCINATE 40 MG: 40 INJECTION, POWDER, FOR SOLUTION INTRAMUSCULAR; INTRAVENOUS at 01:01

## 2018-01-11 RX ADMIN — IPRATROPIUM BROMIDE AND ALBUTEROL SULFATE 3 ML: .5; 3 SOLUTION RESPIRATORY (INHALATION) at 01:01

## 2018-01-11 RX ADMIN — OSELTAMIVIR PHOSPHATE 75 MG: 75 CAPSULE ORAL at 09:01

## 2018-01-11 RX ADMIN — PANTOPRAZOLE SODIUM 40 MG: 40 TABLET, DELAYED RELEASE ORAL at 09:01

## 2018-01-11 RX ADMIN — HYDROCODONE BITARTRATE AND HOMATROPINE METHYLBROMIDE 2 ML: 5; 1.5 SOLUTION ORAL at 05:01

## 2018-01-11 RX ADMIN — HYDROCODONE BITARTRATE AND HOMATROPINE METHYLBROMIDE 2 ML: 5; 1.5 SOLUTION ORAL at 10:01

## 2018-01-11 RX ADMIN — IPRATROPIUM BROMIDE AND ALBUTEROL SULFATE 3 ML: .5; 3 SOLUTION RESPIRATORY (INHALATION) at 08:01

## 2018-01-11 RX ADMIN — ACETAMINOPHEN 650 MG: 325 TABLET, FILM COATED ORAL at 03:01

## 2018-01-11 RX ADMIN — METHYLPREDNISOLONE SODIUM SUCCINATE 40 MG: 40 INJECTION, POWDER, FOR SOLUTION INTRAMUSCULAR; INTRAVENOUS at 10:01

## 2018-01-11 RX ADMIN — IPRATROPIUM BROMIDE AND ALBUTEROL SULFATE 3 ML: .5; 3 SOLUTION RESPIRATORY (INHALATION) at 03:01

## 2018-01-11 RX ADMIN — INSULIN ASPART 3 UNITS: 100 INJECTION, SOLUTION INTRAVENOUS; SUBCUTANEOUS at 05:01

## 2018-01-11 RX ADMIN — IOHEXOL 75 ML: 350 INJECTION, SOLUTION INTRAVENOUS at 06:01

## 2018-01-11 RX ADMIN — ENOXAPARIN SODIUM 40 MG: 100 INJECTION SUBCUTANEOUS at 05:01

## 2018-01-11 RX ADMIN — CEFTRIAXONE 1 G: 1 INJECTION, SOLUTION INTRAVENOUS at 09:01

## 2018-01-11 RX ADMIN — SODIUM CHLORIDE: 0.9 INJECTION, SOLUTION INTRAVENOUS at 09:01

## 2018-01-11 RX ADMIN — HYDROCODONE BITARTRATE AND HOMATROPINE METHYLBROMIDE 2 ML: 5; 1.5 SOLUTION ORAL at 01:01

## 2018-01-11 RX ADMIN — IPRATROPIUM BROMIDE AND ALBUTEROL SULFATE 3 ML: .5; 3 SOLUTION RESPIRATORY (INHALATION) at 04:01

## 2018-01-11 RX ADMIN — POTASSIUM CHLORIDE 40 MEQ: 1500 TABLET, EXTENDED RELEASE ORAL at 09:01

## 2018-01-11 NOTE — PLAN OF CARE
Problem: Patient Care Overview  Goal: Plan of Care Review  Patient is on oxygen tolerating well

## 2018-01-11 NOTE — PLAN OF CARE
01/11/18 1017   PAULINO Message   Medicare Outpatient and Observation Notification regarding financial responsibility Given to patient/caregiver;Explained to patient/caregiver;Signed/date by patient/caregiver   Date PAULINO was signed 01/11/18   Time PAULINO was signed 1000

## 2018-01-11 NOTE — HOSPITAL COURSE
Trip Prasad is a 72 year old male recently diagnosed with influenza who was admitted to [facility] with pneumonia of left lower lobe. He was treated with IV abx, supplemental oxygen, and bronchodilators. Tamiflu was also continued for influenza. CT Chest showed subpleural reticular opacities that could be seen with UIP. He was seen by Pulmonology who ordered autoimmune serologies. Received systemic steroids as well for wheezing(clinical history did not support asthma or RAD). Patient developed PAF with RVR with improved with Cardizem IV and Metoprolol IV x 1. He was seen by Cardiology who recommended Eliquis for CVA prophylaxis. Risk and benefits explained including lack of antidote.  Echocardiogram showed normal LVEF without acute abnormalities. He will also continue Metoprolol for rate control. She will need outpatient stress test one he recovers from pneumonia. Appt has been scheduled with Dr. Burgos 1/31/18.

## 2018-01-11 NOTE — CONSULTS
"[facility]  Pulmonology  Consult Note    Patient Name: Trip Prasad  MRN: [id number]  Admission Date: 1/10/2018  Hospital Length of Stay: 0 days  Code Status: Full Code  Attending Physician: Deshawn Zabala MD  Primary Care Provider: Caleb Leong MD   Principal Problem: Pneumonia of left lower lobe due to infectious organism      Subjective:     HPI:  72 year old male asked to see for cough fatigue and congestion  Recent influenza +ve on droplet isolation and TAMIFLU  Several provider treatments for wheezing /bronchitis over last 3 months.  Thinks was provoked from exposure to dry leaves [place] his property  Associated cough, No dysphagia or hemoptysis  Wheeze better if extends neck  Denies smoking or occupational toxin exposures.  No Hx of asthma or COPD  Works as .  Prior CXR report in [facility] recommended Chest CT 2014.                   Past Medical History:   Diagnosis Date    Diabetes mellitus, type 2 1/2014    Glaucoma 2012    Hiatal hernia     Hyperlipidemia     Loss of hearing     Hearing aids 6/12 bilateral    Obesity (BMI 30-39.9)        Past Surgical History:   Procedure Laterality Date    EYE SURGERY Bilateral 2013    Cataract    GLAUCOMA SURGERY  10/12    ROTATOR CUFF REPAIR Right 3/04    TONSILLECTOMY      TOTAL KNEE ARTHROPLASTY Left     Total right knee replacement Right 11/19/15    WISDOM TOOTH EXTRACTION         Review of patient's allergies indicates:   Allergen Reactions    Biaxin [clarithromycin] Other (See Comments)     Pt unsure     Pcn [penicillins]     Metformin Rash     Rash over body        Family History     Problem Relation (Age of Onset)    Cancer Maternal Aunt, Maternal Uncle    Heart disease Maternal Aunt, Maternal Grandfather        Social History Main Topics    Smoking status: Never Smoker    Smokeless tobacco: Never Used    Alcohol use 0.0 oz/week      Comment: " very little"    Drug use: No    Sexual activity: Yes     Partners: " Female         Review of Systems   Constitutional: Positive for fatigue. Negative for unexpected weight change.   HENT: Negative.    Eyes: Negative.    Respiratory: Positive for cough and wheezing. Negative for apnea.    Cardiovascular: Negative.    Gastrointestinal: Negative.    Endocrine: Negative.    Genitourinary: Negative.    Musculoskeletal: Negative.    Skin: Negative.    Allergic/Immunologic: Negative.    Neurological: Negative.    Hematological: Negative.    Psychiatric/Behavioral: Negative.      Objective:     Vital Signs (Most Recent):  Temp: 98.2 °F (36.8 °C) (01/11/18 1056)  Pulse: 84 (01/11/18 1304)  Resp: 18 (01/11/18 1304)  BP: (!) 143/68 (01/11/18 1056)  SpO2: 98 % (01/11/18 1304) Vital Signs (24h Range):  Temp:  [98.2 °F (36.8 °C)-101.2 °F (38.4 °C)] 98.2 °F (36.8 °C)  Pulse:  [] 84  Resp:  [18-33] 18  SpO2:  [91 %-98 %] 98 %  BP: ()/(55-90) 143/68     Weight: 134.3 kg (296 lb 1.2 oz)  Body mass index is 42.48 kg/m².      Intake/Output Summary (Last 24 hours) at 01/11/18 1322  Last data filed at 01/10/18 1848   Gross per 24 hour   Intake              250 ml   Output                0 ml   Net              250 ml       Physical Exam   Constitutional: He is oriented to person, place, and time. He appears well-developed and well-nourished.   HENT:   Head: Normocephalic and atraumatic.       Nose: Nose normal.   Mouth/Throat: Oropharynx is clear and moist. No oropharyngeal exudate.   Eyes: Conjunctivae and EOM are normal. Pupils are equal, round, and reactive to light.   Neck: Normal range of motion. Neck supple. No JVD present.   Cardiovascular: Normal rate, regular rhythm and normal heart sounds.    No murmur heard.  Pulmonary/Chest: Effort normal and breath sounds normal. No respiratory distress. He has no wheezes. He has no rales. He exhibits no tenderness.   Abdominal: Soft. Bowel sounds are normal.   obese   Musculoskeletal: Normal range of motion. He exhibits no edema.         Legs:  Neurological: He is alert and oriented to person, place, and time. No cranial nerve deficit.   Skin: Skin is warm and dry. Capillary refill takes 2 to 3 seconds.   Nursing note and vitals reviewed.      Vents:  Oxygen Concentration (%): 32 (01/11/18 1304)    Lines/Drains/Airways     Peripheral Intravenous Line                 Peripheral IV - Single Lumen 01/10/18 1550 Right Wrist less than 1 day                Significant Labs:    CBC/Anemia Profile:    Recent Labs  Lab 01/10/18  1554 01/11/18  0443   WBC 6.50 4.95   HGB 14.7 12.9*   HCT 44.2 39.9*   * 124*   MCV 87 88   RDW 14.6* 14.8*        Chemistries:    Recent Labs  Lab 01/10/18  1554 01/11/18  0443    137   K 3.5 3.3*    107   CO2 25 21*   BUN 19 11   CREATININE 1.3 0.9   CALCIUM 8.9 8.0*   ALBUMIN 3.2*  --    PROT 7.2  --    BILITOT 0.6  --    ALKPHOS 86  --    ALT 34  --    AST 35  --        ABGs:   Recent Labs  Lab 01/10/18  1610   PH 7.435   PCO2 34.0*   HCO3 22.8*   POCSATURATED 93*   BE -1     Blood Culture:   Recent Labs  Lab 01/10/18  1555 01/10/18  1556   LABBLOO No Growth to date No Growth to date     POCT Glucose:   Recent Labs  Lab 01/11/18  0545 01/11/18  1132 01/11/18  1133   POCTGLUCOSE 189* 82 144*     Respiratory Culture: No results for input(s): GSRESP, RESPIRATORYC in the last 48 hours.  Urine Culture: No results for input(s): LABURIN in the last 48 hours.  All pertinent labs within the past 24 hours have been reviewed.    Significant Imaging:   I have reviewed and interpreted all pertinent imaging results/findings within the past 24 hours.     Elevated right diaphragm    Assessment/Plan:     * Pneumonia of left lower lobe due to infectious organism    Continue Abx: Ceftriaxone  Sputum culture  immunizations are current          Influenza    Droplet isolation  Tamiflu 5 days        Elevated diaphragm    Will need SNIFF study        Wheezing without diagnosis of asthma    Wheeze over vocal cords  Either VCD,   May  need ENT eval    [facility] Hx doesn't support asthma or RAD              Thank you for your consult. I will follow-up with patient. Please contact us if you have any additional questions.     Taqueria Garcia MD  Pulmonology  [facility]

## 2018-01-11 NOTE — SUBJECTIVE & OBJECTIVE
Interval History:  No acute events event overnight. Patient with continue wheezing. Tachypnea with talking and frequent rest periods with talking. Patient reports slight improvement with breathing but c/o cough. Will continue IV abx and duonebs. Will add IV steroids and Hycoden cough syrup. Pulmonology consulted for assistance with medical management.     Review of Systems   Constitutional: Negative for activity change, appetite change, chills, diaphoresis, fatigue, fever and unexpected weight change.   HENT: Negative for congestion, nosebleeds, postnasal drip, rhinorrhea, sinus pain, sinus pressure, sore throat and trouble swallowing.    Eyes: Negative for photophobia, discharge and visual disturbance.   Respiratory: Positive for cough, shortness of breath and wheezing. Negative for choking and chest tightness.    Cardiovascular: Negative for chest pain, palpitations and leg swelling.   Gastrointestinal: Negative for abdominal distention, abdominal pain, constipation, diarrhea, nausea and vomiting.   Endocrine: Negative for cold intolerance and heat intolerance.   Genitourinary: Negative for difficulty urinating, discharge, flank pain, frequency, penile pain and urgency.   Musculoskeletal: Negative for arthralgias, back pain, myalgias and neck pain.   Skin: Negative for color change, pallor, rash and wound.   Allergic/Immunologic: Negative for environmental allergies, food allergies and immunocompromised state.   Neurological: Negative for dizziness, seizures, syncope, weakness, light-headedness, numbness and headaches.   Hematological: Negative for adenopathy. Does not bruise/bleed easily.   Psychiatric/Behavioral: Negative for agitation, confusion and hallucinations. The patient is not nervous/anxious.      Objective:     Vital Signs (Most Recent):  Temp: 98.2 °F (36.8 °C) (01/11/18 1056)  Pulse: 82 (01/11/18 1126)  Resp: 20 (01/11/18 1056)  BP: (!) 143/68 (01/11/18 1056)  SpO2: 95 % (01/11/18 1056) Vital Signs  (24h Range):  Temp:  [98.2 °F (36.8 °C)-101.2 °F (38.4 °C)] 98.2 °F (36.8 °C)  Pulse:  [] 82  Resp:  [18-33] 20  SpO2:  [91 %-98 %] 95 %  BP: ()/(55-90) 143/68     Weight: 134.3 kg (296 lb 1.2 oz)  Body mass index is 42.48 kg/m².    Intake/Output Summary (Last 24 hours) at 01/11/18 1231  Last data filed at 01/10/18 1848   Gross per 24 hour   Intake              250 ml   Output                0 ml   Net              250 ml      Physical Exam   Constitutional: He is oriented to person, place, and time. He appears well-developed and well-nourished. No distress.   HENT:   Head: Normocephalic and atraumatic.   Eyes: Conjunctivae and EOM are normal. Pupils are equal, round, and reactive to light.   Neck: Normal range of motion. Neck supple.   Cardiovascular: Normal rate, regular rhythm, normal heart sounds and intact distal pulses.  Exam reveals no gallop and no friction rub.    No murmur heard.  Pulmonary/Chest: Effort normal. No respiratory distress (mild). He has wheezes.   Abdominal: Soft. Bowel sounds are normal. He exhibits no distension. There is no tenderness. There is no guarding.   obese   Genitourinary:   Genitourinary Comments: deferred   Musculoskeletal: Normal range of motion. He exhibits edema.   Neurological: He is alert and oriented to person, place, and time.   Skin: Skin is warm and dry. Capillary refill takes less than 2 seconds. He is not diaphoretic.   Psychiatric: He has a normal mood and affect. His behavior is normal. Judgment and thought content normal.       Significant Labs:   Recent Lab Results       01/11/18  1133 01/11/18  1132 01/11/18  0545 01/11/18  0443 01/11/18  0023      Albumin          Alkaline Phosphatase          Allens Test          ALT          Anion Gap    9      Appearance, UA          AST          Bacteria, UA          Baso #    0.01      Basophil%    0.2      Bilirubin (UA)          Total Bilirubin          Blood Culture, Routine          BNP          Site           BUN, Bld    11      Calcium    8.0(L)      Chloride    107      CO2    21(L)      Color, UA          CPK          Creatinine    0.9      DelSys          Differential Method    Automated      eGFR if     >60      eGFR if non     >60  Comment:  Calculation used to obtain the estimated glomerular filtration  rate (eGFR) is the CKD-EPI equation.         Eos #    0.0      Eosinophil%    0.0      FiO2          Glucose    171(H)      Glucose, UA          Gran #    2.6      Gran%    53.1      Hematocrit    39.9(L)      Hemoglobin    12.9(L)      Hyaline Casts, UA          Ketones, UA          Lactate, Emil     0.9  Comment:  Falsely low lactic acid results can be found in samples   containing >=13.0 mg/dL total bilirubin and/or >=3.5 mg/dL   direct bilirubin.       Leukocytes, UA          Lymph #    1.9      Lymph%    38.4      MCH    28.4      MCHC    32.3      MCV    88      Microscopic Comment          Mode          Mono #    0.4      Mono%    8.3      MPV    9.5      Nitrite, UA          Occult Blood UA          pH, UA          Platelets    124(L)      POC BE          POC HCO3          POC PCO2          POC PH          POC PO2          POC SATURATED O2          POCT Glucose 144(H) 82 189(H)       Potassium    3.3(L)      Total Protein          Protein, UA          RBC    4.55(L)      RBC, UA          RDW    14.8(H)      Sample          Sodium    137      Specific Gravity, UA          Specimen UA          Troponin I          Urobilinogen, UA          WBC, UA          WBC    4.95                  01/10/18  2112 01/10/18  1923 01/10/18  1849 01/10/18  1610 01/10/18  1556      Albumin          Alkaline Phosphatase          Allens Test    Pass      ALT          Anion Gap          Appearance, UA   Clear       AST          Bacteria, UA   Rare       Baso #          Basophil%          Bilirubin (UA)   Negative       Total Bilirubin          Blood Culture, Routine     No Growth to date[P]     BNP           Site    RR      BUN, Bld          Calcium          Chloride          CO2          Color, UA   Yellow       CPK          Creatinine          DelSys    Room Air      Differential Method          eGFR if           eGFR if non           Eos #          Eosinophil%          FiO2    21      Glucose          Glucose, UA   Negative       Gran #          Gran%          Hematocrit          Hemoglobin          Hyaline Casts, UA   0       Ketones, UA   Negative       Lactate, Emil 1.0  Comment:  Falsely low lactic acid results can be found in samples   containing >=13.0 mg/dL total bilirubin and/or >=3.5 mg/dL   direct bilirubin.           Leukocytes, UA   Negative       Lymph #          Lymph%          MCH          MCHC          MCV          Microscopic Comment   SEE COMMENT  Comment:  Other formed elements not mentioned in the report are not   present in the microscopic examination.          Mode    SPONT      Mono #          Mono%          MPV          Nitrite, UA   Negative       Occult Blood UA   Trace(A)       pH, UA   6.0       Platelets          POC BE    -1      POC HCO3    22.8(L)      POC PCO2    34.0(L)      POC PH    7.435      POC PO2    65(L)      POC SATURATED O2    93(L)      POCT Glucose  171(H)        Potassium          Total Protein          Protein, UA   1+  Comment:  Recommend a 24 hour urine protein or a urine   protein/creatinine ratio if globulin induced proteinuria is  clinically suspected.  (A)       RBC          RBC, UA   0       RDW          Sample    ARTERIAL      Sodium          Specific [place], UA   1.025       Specimen UA   Urine, Clean Catch       Troponin I          Urobilinogen, UA   Negative       WBC, UA   2       WBC                      01/10/18  1555 01/10/18  1554      Albumin  3.2(L)     Alkaline Phosphatase  86     Allens Test       ALT  34     Anion Gap  12     Appearance, UA       AST  35     Bacteria, UA       Baso #  0.01     Basophil%  0.2      Bilirubin (UA)       Total Bilirubin  0.6  Comment:  For infants and newborns, interpretation of results should be based  on gestational age, weight and in agreement with clinical  observations.  Premature Infant recommended reference ranges:  Up to 24 hours.............<8.0 mg/dL  Up to 48 hours............<12.0 mg/dL  3-5 days..................<15.0 mg/dL  6-29 days.................<15.0 mg/dL       Blood Culture, Routine No Growth to date[P]      BNP  <10  Comment:  Values of less than 100 pg/ml are consistent with non-CHF populations.     Site       BUN, Bld  19     Calcium  8.9     Chloride  100     CO2  25     Color, UA       CPK  184     Creatinine  1.3     DelSys       Differential Method  Automated     eGFR if   >60     eGFR if non   55  Comment:  Calculation used to obtain the estimated glomerular filtration  rate (eGFR) is the CKD-EPI equation.   (A)     Eos #  0.0     Eosinophil%  0.2     FiO2       Glucose  204(H)     Glucose, UA       Gran #  4.2     Gran%  64.5     Hematocrit  44.2     Hemoglobin  14.7     Hyaline Casts, UA       Ketones, UA       Lactate, Emil  1.3  Comment:  Falsely low lactic acid results can be found in samples   containing >=13.0 mg/dL total bilirubin and/or >=3.5 mg/dL   direct bilirubin.       Leukocytes, UA       Lymph #  1.8     Lymph%  26.9     MCH  28.8     MCHC  33.3     MCV  87     Microscopic Comment       Mode       Mono #  0.5     Mono%  8.2     MPV  9.8     Nitrite, UA       Occult Blood UA       pH, UA       Platelets  140(L)     POC BE       POC HCO3       POC PCO2       POC PH       POC PO2       POC SATURATED O2       POCT Glucose       Potassium  3.5     Total Protein  7.2     Protein, UA       RBC  5.11     RBC, UA       RDW  14.6(H)     Sample       Sodium  137     Specific Gravity, UA       Specimen UA       Troponin I  <0.006  Comment:  The reference interval for Troponin I represents the 99th percentile   cutoff   for our  facility and is consistent with 3rd generation assay   performance.       Urobilinogen, UA       WBC, UA       WBC  6.50           Significant Imaging:   Imaging Results          X-Ray Chest AP Portable (Final result)  Result time 01/10/18 15:13:25    Final result by Germaine Malcolm MD (01/10/18 15:13:25)                 Impression:      Probable left lung base consolidation.      Electronically signed by: GERMAINE MALCOLM MD  Date:     01/10/18  Time:    15:13              Narrative:    Exam: XR CHEST AP PORTABLE,    Date:  01/10/18 15:08:07    History: sob    Comparison:  09/12/2016    Findings: Cardiac size is normal.  There is elevation of the right diaphragm.  The right lung is clear.    Loss of definition of the left diaphragm with blunting of the left costophrenic angle.  Probable left lung base consolidation.

## 2018-01-11 NOTE — SUBJECTIVE & OBJECTIVE
"Past Medical History:   Diagnosis Date    Diabetes mellitus, type 2 1/2014    Glaucoma 2012    Hiatal hernia     Hyperlipidemia     Loss of hearing     Hearing aids 6/12 bilateral    Obesity (BMI 30-39.9)        Past Surgical History:   Procedure Laterality Date    EYE SURGERY Bilateral 2013    Cataract    GLAUCOMA SURGERY  10/12    ROTATOR CUFF REPAIR Right 3/04    TONSILLECTOMY      TOTAL KNEE ARTHROPLASTY Left     Total right knee replacement Right 11/19/15    WISDOM TOOTH EXTRACTION         Review of patient's allergies indicates:   Allergen Reactions    Biaxin [clarithromycin] Other (See Comments)     Pt unsure     Pcn [penicillins]     Metformin Rash     Rash over body        Family History     Problem Relation (Age of Onset)    Cancer Maternal Aunt, Maternal Uncle    Heart disease Maternal Aunt, Maternal Grandfather        Social History Main Topics    Smoking status: Never Smoker    Smokeless tobacco: Never Used    Alcohol use 0.0 oz/week      Comment: " very little"    Drug use: No    Sexual activity: Yes     Partners: Female         Review of Systems   Constitutional: Positive for fatigue. Negative for unexpected weight change.   HENT: Negative.    Eyes: Negative.    Respiratory: Positive for cough and wheezing. Negative for apnea.    Cardiovascular: Negative.    Gastrointestinal: Negative.    Endocrine: Negative.    Genitourinary: Negative.    Musculoskeletal: Negative.    Skin: Negative.    Allergic/Immunologic: Negative.    Neurological: Negative.    Hematological: Negative.    Psychiatric/Behavioral: Negative.      Objective:     Vital Signs (Most Recent):  Temp: 98.2 °F (36.8 °C) (01/11/18 1056)  Pulse: 84 (01/11/18 1304)  Resp: 18 (01/11/18 1304)  BP: (!) 143/68 (01/11/18 1056)  SpO2: 98 % (01/11/18 1304) Vital Signs (24h Range):  Temp:  [98.2 °F (36.8 °C)-101.2 °F (38.4 °C)] 98.2 °F (36.8 °C)  Pulse:  [] 84  Resp:  [18-33] 18  SpO2:  [91 %-98 %] 98 %  BP: ()/(55-90) " 143/68     Weight: 134.3 kg (296 lb 1.2 oz)  Body mass index is 42.48 kg/m².      Intake/Output Summary (Last 24 hours) at 01/11/18 1322  Last data filed at 01/10/18 1848   Gross per 24 hour   Intake              250 ml   Output                0 ml   Net              250 ml       Physical Exam   Constitutional: He is oriented to person, place, and time. He appears well-developed and well-nourished.   HENT:   Head: Normocephalic and atraumatic.       Nose: Nose normal.   Mouth/Throat: Oropharynx is clear and moist. No oropharyngeal exudate.   Eyes: Conjunctivae and EOM are normal. Pupils are equal, round, and reactive to light.   Neck: Normal range of motion. Neck supple. No JVD present.   Cardiovascular: Normal rate, regular rhythm and normal heart sounds.    No murmur heard.  Pulmonary/Chest: Effort normal and breath sounds normal. No respiratory distress. He has no wheezes. He has no rales. He exhibits no tenderness.   Abdominal: Soft. Bowel sounds are normal.   obese   Musculoskeletal: Normal range of motion. He exhibits no edema.        Legs:  Neurological: He is alert and oriented to person, place, and time. No cranial nerve deficit.   Skin: Skin is warm and dry. Capillary refill takes 2 to 3 seconds.   Nursing note and vitals reviewed.      Vents:  Oxygen Concentration (%): 32 (01/11/18 1304)    Lines/Drains/Airways     Peripheral Intravenous Line                 Peripheral IV - Single Lumen 01/10/18 1550 Right Wrist less than 1 day                Significant Labs:    CBC/Anemia Profile:    Recent Labs  Lab 01/10/18  1554 01/11/18  0443   WBC 6.50 4.95   HGB 14.7 12.9*   HCT 44.2 39.9*   * 124*   MCV 87 88   RDW 14.6* 14.8*        Chemistries:    Recent Labs  Lab 01/10/18  1554 01/11/18  0443    137   K 3.5 3.3*    107   CO2 25 21*   BUN 19 11   CREATININE 1.3 0.9   CALCIUM 8.9 8.0*   ALBUMIN 3.2*  --    PROT 7.2  --    BILITOT 0.6  --    ALKPHOS 86  --    ALT 34  --    AST 35  --         ABGs:   Recent Labs  Lab 01/10/18  1610   PH 7.435   PCO2 34.0*   HCO3 22.8*   POCSATURATED 93*   BE -1     Blood Culture:   Recent Labs  Lab 01/10/18  1555 01/10/18  1556   LABBLOO No Growth to date No Growth to date     POCT Glucose:   Recent Labs  Lab 01/11/18  0545 01/11/18  1132 01/11/18  1133   POCTGLUCOSE 189* 82 144*     Respiratory Culture: No results for input(s): GSRESP, RESPIRATORYC in the last 48 hours.  Urine Culture: No results for input(s): LABURIN in the last 48 hours.  All pertinent labs within the past 24 hours have been reviewed.    Significant Imaging:   I have reviewed and interpreted all pertinent imaging results/findings within the past 24 hours.     Elevated right diaphragm

## 2018-01-11 NOTE — SUBJECTIVE & OBJECTIVE
"Past Medical History:   Diagnosis Date    Diabetes mellitus, type 2 1/2014    Glaucoma 2012    Hiatal hernia     Hyperlipidemia     Loss of hearing     Hearing aids 6/12 bilateral    Obesity (BMI 30-39.9)        Past Surgical History:   Procedure Laterality Date    EYE SURGERY Bilateral 2013    Cataract    GLAUCOMA SURGERY  10/12    ROTATOR CUFF REPAIR Right 3/04    TONSILLECTOMY      TOTAL KNEE ARTHROPLASTY Left     Total right knee replacement Right 11/19/15    WISDOM TOOTH EXTRACTION         Review of patient's allergies indicates:   Allergen Reactions    Biaxin [clarithromycin] Other (See Comments)     Pt unsure     Pcn [penicillins]     Metformin Rash     Rash over body        No current facility-administered medications on file prior to encounter.      Current Outpatient Prescriptions on File Prior to Encounter   Medication Sig    aspirin (ECOTRIN) 81 MG EC tablet Take 81 mg by mouth once daily.    blood sugar diagnostic Strp 1 strip by Misc.(Non-Drug; Combo Route) route once daily.    brinzolamide-brimonidine 1-0.2 % DrpS Apply to eye.    triamcinolone acetonide 0.1% (KENALOG) 0.1 % cream Apply topically 2 (two) times daily.     Family History     Problem Relation (Age of Onset)    Cancer Maternal Aunt, Maternal Uncle    Heart disease Maternal Aunt, Maternal Grandfather        Social History Main Topics    Smoking status: Never Smoker    Smokeless tobacco: Never Used    Alcohol use 0.0 oz/week      Comment: " very little"    Drug use: No    Sexual activity: Yes     Partners: Female     Review of Systems   Constitutional: Positive for appetite change and fatigue. Negative for activity change, chills, diaphoresis, fever and unexpected weight change.   HENT: Negative for congestion, nosebleeds, postnasal drip, rhinorrhea, sinus pain, sinus pressure, sore throat and trouble swallowing.    Eyes: Negative for photophobia, discharge and visual disturbance.   Respiratory: Positive for cough " and shortness of breath. Negative for choking, chest tightness and wheezing.    Cardiovascular: Negative for chest pain, palpitations and leg swelling.   Gastrointestinal: Negative for abdominal distention, abdominal pain, constipation, diarrhea, nausea and vomiting.   Endocrine: Negative for cold intolerance and heat intolerance.   Genitourinary: Negative for difficulty urinating, discharge, flank pain, frequency, penile pain and urgency.   Musculoskeletal: Negative for arthralgias, back pain, myalgias and neck pain.   Skin: Negative for color change, pallor, rash and wound.   Allergic/Immunologic: Negative for environmental allergies, food allergies and immunocompromised state.   Neurological: Positive for weakness. Negative for dizziness, seizures, syncope, light-headedness, numbness and headaches.   Hematological: Negative for adenopathy. Does not bruise/bleed easily.   Psychiatric/Behavioral: Negative for agitation, confusion and hallucinations. The patient is not nervous/anxious.      Objective:     Vital Signs (Most Recent):  Temp: 98.8 °F (37.1 °C) (01/10/18 1757)  Pulse: 93 (01/10/18 1631)  Resp: (!) 25 (01/10/18 1631)  BP: (!) 182/90 (01/10/18 1631)  SpO2: 96 % (01/10/18 1631) Vital Signs (24h Range):  Temp:  [98.3 °F (36.8 °C)-98.8 °F (37.1 °C)] 98.8 °F (37.1 °C)  Pulse:  [] 93  Resp:  [18-25] 25  SpO2:  [91 %-97 %] 96 %  BP: ()/(55-90) 182/90     Weight: 130.6 kg (288 lb)  Body mass index is 41.32 kg/m².    Physical Exam   Constitutional: He is oriented to person, place, and time. He appears well-developed and well-nourished. No distress.   HENT:   Head: Normocephalic and atraumatic.   Eyes: Conjunctivae and EOM are normal. Pupils are equal, round, and reactive to light. Right eye exhibits no discharge. Left eye exhibits no discharge.   Neck: Normal range of motion. Neck supple.   Cardiovascular: Normal rate, regular rhythm, normal heart sounds and intact distal pulses.  Exam reveals no  gallop and no friction rub.    No murmur heard.  Pulmonary/Chest: Tachypnea noted. He is in respiratory distress (mild ). He has wheezes. He has no rales. He exhibits no tenderness.   Abdominal: Soft. Bowel sounds are normal. He exhibits no distension. There is no tenderness.   Genitourinary:   Genitourinary Comments: deferred   Musculoskeletal: Normal range of motion.   Neurological: He is alert and oriented to person, place, and time.   Skin: Skin is warm and dry. Capillary refill takes less than 2 seconds. He is not diaphoretic.   Psychiatric: He has a normal mood and affect. His behavior is normal. Judgment and thought content normal.         CRANIAL NERVES     CN III, IV, VI   Pupils are equal, round, and reactive to light.  Extraocular motions are normal.        Significant Labs:   Recent Lab Results       01/10/18  1610 01/10/18  1554      Albumin  3.2(L)     Alkaline Phosphatase  86     Allens Test Pass      ALT  34     Anion Gap  12     AST  35     Baso #  0.01     Basophil%  0.2     Total Bilirubin  0.6  Comment:  For infants and newborns, interpretation of results should be based  on gestational age, weight and in agreement with clinical  observations.  Premature Infant recommended reference ranges:  Up to 24 hours.............<8.0 mg/dL  Up to 48 hours............<12.0 mg/dL  3-5 days..................<15.0 mg/dL  6-29 days.................<15.0 mg/dL       BNP  <10  Comment:  Values of less than 100 pg/ml are consistent with non-CHF populations.     Site RR      BUN, Bld  19     Calcium  8.9     Chloride  100     CO2  25     CPK  184     Creatinine  1.3     DelSys Room Air      Differential Method  Automated     eGFR if   >60     eGFR if non   55  Comment:  Calculation used to obtain the estimated glomerular filtration  rate (eGFR) is the CKD-EPI equation.   (A)     Eos #  0.0     Eosinophil%  0.2     FiO2 21      Glucose  204(H)     Gran #  4.2     Gran%  64.5      Hematocrit  44.2     Hemoglobin  14.7     Lactate, Emil  1.3  Comment:  Falsely low lactic acid results can be found in samples   containing >=13.0 mg/dL total bilirubin and/or >=3.5 mg/dL   direct bilirubin.       Lymph #  1.8     Lymph%  26.9     MCH  28.8     MCHC  33.3     MCV  87     Mode SPONT      Mono #  0.5     Mono%  8.2     MPV  9.8     Platelets  140(L)     POC BE -1      POC HCO3 22.8(L)      POC PCO2 34.0(L)      POC PH 7.435      POC PO2 65(L)      POC SATURATED O2 93(L)      Potassium  3.5     Total Protein  7.2     RBC  5.11     RDW  14.6(H)     Sample ARTERIAL      Sodium  137     Troponin I  <0.006  Comment:  The reference interval for Troponin I represents the 99th percentile   cutoff   for our facility and is consistent with 3rd generation assay   performance.       WBC  6.50           Significant Imaging:   Imaging Results          X-Ray Chest AP Portable (Final result)  Result time 01/10/18 15:13:25    Final result by Germaine Malcolm MD (01/10/18 15:13:25)                 Impression:      Probable left lung base consolidation.      Electronically signed by: GERMAINE MALCOLM MD  Date:     01/10/18  Time:    15:13              Narrative:    Exam: XR CHEST AP PORTABLE,    Date:  01/10/18 15:08:07    History: sob    Comparison:  09/12/2016    Findings: Cardiac size is normal.  There is elevation of the right diaphragm.  The right lung is clear.    Loss of definition of the left diaphragm with blunting of the left costophrenic angle.  Probable left lung base consolidation.

## 2018-01-11 NOTE — PLAN OF CARE
Problem: Patient Care Overview  Goal: Plan of Care Review  Outcome: Ongoing (interventions implemented as appropriate)  Pt AAO x 4.  VSS.  Pt remained afebrile throughout this shift.   IV fluids and abx administered per order.   Pt remained free of falls this shift.   Pt c/o of pain when coughing this shift.  Blood glucose monitored. Corrected via SSI.   Plan of care reviewed. Patient verbalizes understanding.   Pt moving/turing independently. Frequent weight shifting encouraged.    Patient SR on monitor.   Bed low, side rails up x 2, wheels locked, call light in reach.   Patient instructed to call for assistance.   Hourly rounding completed.   Will continue to monitor.

## 2018-01-11 NOTE — HPI
72 year old male asked to see for cough fatigue and congestion  Recent influenza +ve on droplet isolation and TAMIFLU  Several provider treatments for wheezing /bronchitis over last 3 months.  Thinks was provoked from exposure [place] property  Associated cough, No dysphagia or hemoptysis  Wheeze better if extends neck  Denies smoking or occupational toxin exposures.  No Hx of asthma or COPD  Works as .  Prior CXR report in [facility] recommended Chest CT 2014.

## 2018-01-11 NOTE — HOSPITAL COURSE
Patient with continue wheezing. Tachypnea with talking and frequent rest periods with talking.   Patient reports slight improvement with breathing but c/o cough.  Continue IV abx and duonebs.   IV steroids and Hycoden cough syrup.   Pulmonology consulted for assistance with medical management.     01/12/2018: son at bedside, reviewed imaging, easily SOB. SpO2 was 94% on RA  CT neck and chest reviewed  1/13/2018: SOB somewhat better, still need O2, Dimer +ve.Echo normal  1/14/2018: at baseline, A fib last night

## 2018-01-11 NOTE — ASSESSMENT & PLAN NOTE
Wheeze over vocal cords  Either VCD,   May need ENT eval    [facility] Hx doesn't support asthma or RAD

## 2018-01-11 NOTE — ASSESSMENT & PLAN NOTE
-CXR in ED consistent with LLL pneumonia  -Avelox initiated in ED  -afebril, no leukocytosis  -Duonebs  -supplemental O2 to keep O2 sat > 92%  -telemetry monitor  -frequent vital signs  -daily CBC, BMP  -Admit Obs

## 2018-01-11 NOTE — NURSING
Patient noted to be SOB,Dr. Munroe notified, order received for duo nebs q4 MD karlie also stated okay to reorder pt home dose of tamiflu. Will continue to monitor.

## 2018-01-11 NOTE — H&P
[facility] Hospital Medicine  History & Physical    Patient Name: Trip Prasad  MRN: [id number]  Admission Date: 1/10/2018  Attending Physician: Jluia Wheat MD   Primary Care Provider: Caleb Leong MD         Patient information was obtained from patient, relative(s) and ER records.     Subjective:     Principal Problem:Pneumonia of left lower lobe due to infectious organism    Chief Complaint:   Chief Complaint   Patient presents with    Shortness of Breath     dx flu 2 days ago. Reports fatigue, cough, congestion.         HPI: Trip Prasad is a 72 y.o. male patient  who was diagnosed and tx for bronchitis x 3 weeks ago (prescribed inhaler and nebulizer for wheezing) with recent diagnosis of influenza per PCP x 3 days ago ( started on Tamiflu) who presented to the Emergency Department with complaints of worsening SOB which onset gradually 3 weeks ago. Symptoms are constant and moderate in severity. No mitigating or exacerbating factors reported. Associated sxs include fatigue, cough, and congestion. Patient denies any fever, chills, diaphoresis, night sweats, headache, chest pain, palpitations, leg swelling, sore throat, rhinorrhea. Initial workup revealed abg with hypoxia. CXR with left lung base pneumonia. No leukocytosis, afebrile. Hospital Medicine consulted for admission.    Past Medical History:   Diagnosis Date    Diabetes mellitus, type 2 1/2014    Glaucoma 2012    Hiatal hernia     Hyperlipidemia     Loss of hearing     Hearing aids 6/12 bilateral    Obesity (BMI 30-39.9)        Past Surgical History:   Procedure Laterality Date    EYE SURGERY Bilateral 2013    Cataract    GLAUCOMA SURGERY  10/12    ROTATOR CUFF REPAIR Right 3/04    TONSILLECTOMY      TOTAL KNEE ARTHROPLASTY Left     Total right knee replacement Right 11/19/15    WISDOM TOOTH EXTRACTION         Review of patient's allergies indicates:   Allergen Reactions    Biaxin [clarithromycin] Other (See  "Comments)     Pt unsure     Pcn [penicillins]     Metformin Rash     Rash over body        No current facility-administered medications on file prior to encounter.      Current Outpatient Prescriptions on File Prior to Encounter   Medication Sig    aspirin (ECOTRIN) 81 MG EC tablet Take 81 mg by mouth once daily.    blood sugar diagnostic Strp 1 strip by Misc.(Non-Drug; Combo Route) route once daily.    brinzolamide-brimonidine 1-0.2 % DrpS Apply to eye.    triamcinolone acetonide 0.1% (KENALOG) 0.1 % cream Apply topically 2 (two) times daily.     Family History     Problem Relation (Age of Onset)    Cancer Maternal Aunt, Maternal Uncle    Heart disease Maternal Aunt, Maternal Grandfather        Social History Main Topics    Smoking status: Never Smoker    Smokeless tobacco: Never Used    Alcohol use 0.0 oz/week      Comment: " very little"    Drug use: No    Sexual activity: Yes     Partners: Female     Review of Systems   Constitutional: Positive for appetite change and fatigue. Negative for activity change, chills, diaphoresis, fever and unexpected weight change.   HENT: Negative for congestion, nosebleeds, postnasal drip, rhinorrhea, sinus pain, sinus pressure, sore throat and trouble swallowing.    Eyes: Negative for photophobia, discharge and visual disturbance.   Respiratory: Positive for cough and shortness of breath. Negative for choking, chest tightness and wheezing.    Cardiovascular: Negative for chest pain, palpitations and leg swelling.   Gastrointestinal: Negative for abdominal distention, abdominal pain, constipation, diarrhea, nausea and vomiting.   Endocrine: Negative for cold intolerance and heat intolerance.   Genitourinary: Negative for difficulty urinating, discharge, flank pain, frequency, penile pain and urgency.   Musculoskeletal: Negative for arthralgias, back pain, myalgias and neck pain.   Skin: Negative for color change, pallor, rash and wound.   Allergic/Immunologic: Negative " for environmental allergies, food allergies and immunocompromised state.   Neurological: Positive for weakness. Negative for dizziness, seizures, syncope, light-headedness, numbness and headaches.   Hematological: Negative for adenopathy. Does not bruise/bleed easily.   Psychiatric/Behavioral: Negative for agitation, confusion and hallucinations. The patient is not nervous/anxious.      Objective:     Vital Signs (Most Recent):  Temp: 98.8 °F (37.1 °C) (01/10/18 1757)  Pulse: 93 (01/10/18 1631)  Resp: (!) 25 (01/10/18 1631)  BP: (!) 182/90 (01/10/18 1631)  SpO2: 96 % (01/10/18 1631) Vital Signs (24h Range):  Temp:  [98.3 °F (36.8 °C)-98.8 °F (37.1 °C)] 98.8 °F (37.1 °C)  Pulse:  [] 93  Resp:  [18-25] 25  SpO2:  [91 %-97 %] 96 %  BP: ()/(55-90) 182/90     Weight: 130.6 kg (288 lb)  Body mass index is 41.32 kg/m².    Physical Exam   Constitutional: He is oriented to person, place, and time. He appears well-developed and well-nourished. No distress.   HENT:   Head: Normocephalic and atraumatic.   Eyes: Conjunctivae and EOM are normal. Pupils are equal, round, and reactive to light. Right eye exhibits no discharge. Left eye exhibits no discharge.   Neck: Normal range of motion. Neck supple.   Cardiovascular: Normal rate, regular rhythm, normal heart sounds and intact distal pulses.  Exam reveals no gallop and no friction rub.    No murmur heard.  Pulmonary/Chest: Tachypnea noted. He is in respiratory distress (mild ). He has wheezes. He has no rales. He exhibits no tenderness.   Abdominal: Soft. Bowel sounds are normal. He exhibits no distension. There is no tenderness.   Genitourinary:   Genitourinary Comments: deferred   Musculoskeletal: Normal range of motion.   Neurological: He is alert and oriented to person, place, and time.   Skin: Skin is warm and dry. Capillary refill takes less than 2 seconds. He is not diaphoretic.   Psychiatric: He has a normal mood and affect. His behavior is normal. Judgment  and thought content normal.         CRANIAL NERVES     CN III, IV, VI   Pupils are equal, round, and reactive to light.  Extraocular motions are normal.        Significant Labs:   Recent Lab Results       01/10/18  1610 01/10/18  1554      Albumin  3.2(L)     Alkaline Phosphatase  86     Allens Test Pass      ALT  34     Anion Gap  12     AST  35     Baso #  0.01     Basophil%  0.2     Total Bilirubin  0.6  Comment:  For infants and newborns, interpretation of results should be based  on gestational age, weight and in agreement with clinical  observations.  Premature Infant recommended reference ranges:  Up to 24 hours.............<8.0 mg/dL  Up to 48 hours............<12.0 mg/dL  3-5 days..................<15.0 mg/dL  6-29 days.................<15.0 mg/dL       BNP  <10  Comment:  Values of less than 100 pg/ml are consistent with non-CHF populations.     Site RR      BUN, Bld  19     Calcium  8.9     Chloride  100     CO2  25     CPK  184     Creatinine  1.3     DelSys Room Air      Differential Method  Automated     eGFR if   >60     eGFR if non   55  Comment:  Calculation used to obtain the estimated glomerular filtration  rate (eGFR) is the CKD-EPI equation.   (A)     Eos #  0.0     Eosinophil%  0.2     FiO2 21      Glucose  204(H)     Gran #  4.2     Gran%  64.5     Hematocrit  44.2     Hemoglobin  14.7     Lactate, Emil  1.3  Comment:  Falsely low lactic acid results can be found in samples   containing >=13.0 mg/dL total bilirubin and/or >=3.5 mg/dL   direct bilirubin.       Lymph #  1.8     Lymph%  26.9     MCH  28.8     MCHC  33.3     MCV  87     Mode SPONT      Mono #  0.5     Mono%  8.2     MPV  9.8     Platelets  140(L)     POC BE -1      POC HCO3 22.8(L)      POC PCO2 34.0(L)      POC PH 7.435      POC PO2 65(L)      POC SATURATED O2 93(L)      Potassium  3.5     Total Protein  7.2     RBC  5.11     RDW  14.6(H)     Sample ARTERIAL      Sodium  137     Troponin I   <0.006  Comment:  The reference interval for Troponin I represents the 99th percentile   cutoff   for our facility and is consistent with 3rd generation assay   performance.       WBC  6.50           Significant Imaging:   Imaging Results          X-Ray Chest AP Portable (Final result)  Result time 01/10/18 15:13:25    Final result by Germaine Malcolm MD (01/10/18 15:13:25)                 Impression:      Probable left lung base consolidation.      Electronically signed by: GERMAINE MALCOLM MD  Date:     01/10/18  Time:    15:13              Narrative:    Exam: XR CHEST AP PORTABLE,    Date:  01/10/18 15:08:07    History: sob    Comparison:  09/12/2016    Findings: Cardiac size is normal.  There is elevation of the right diaphragm.  The right lung is clear.    Loss of definition of the left diaphragm with blunting of the left costophrenic angle.  Probable left lung base consolidation.                            Assessment/Plan:     * Pneumonia of left lower lobe due to infectious organism    -CXR in ED consistent with LLL pneumonia  -Avelox IV x 1 initiated in ED  -Doxy and rocephin ordered  -afebril, no leukocytosis  -Duonebs  -supplemental O2 to keep O2 sat > 92%  -telemetry monitor  -frequent vital signs  -daily CBC, BMP  -Admit Obs            Influenza    Diagnoses at PCP office x 3 days ago  -resume Tamiflu          Diabetes mellitus, type 2    -accu check  -ISS  -continue to monitor                VTE Risk Mitigation         Ordered     Medium Risk of VTE  Once      01/10/18 1715     Place sequential compression device  Until discontinued      01/10/18 1715             Naseem Ramirez DNP, ACNP-Bc, CCRN  Department of Hospital Medicine   [facility]

## 2018-01-11 NOTE — PLAN OF CARE
Problem: Patient Care Overview  Goal: Plan of Care Review  Outcome: Ongoing (interventions implemented as appropriate)  Patient stable. Plan of care reviewed. Patient verbalizes understanding. Tmax 102.1. IV fluids infusing. Bed low, wheels locked, bed alarm on, call light in reach. Patient instructed to call for assistance. Will continue to monitor.

## 2018-01-11 NOTE — NURSING
Patient arrived to room accompanied by nurse. Transferred to bed . Patient is aaox4 with no distress noted.  Assessment as charted. Patient has been orientated to room, call light, fall precautions, rounding sheet, and board. Heart monitor in place, vital signs taken, no questions or concerns at this time.

## 2018-01-11 NOTE — ASSESSMENT & PLAN NOTE
-CXR in ED consistent with LLL pneumonia  -Avelox initiated in ED  -afebril, no leukocytosis  -Duonebs  -supplemental O2 to keep O2 sat > 92%  -telemetry monitor  -frequent vital signs  -daily CBC, BMP  -Admit Obs    1/11/18: .Patient with continue wheezing. Tachypnea with talking and frequent rest periods with talking. Patient reports slight improvement with breathing. Will continue IV abx and duonebs. Will add IV steroids and Hycoden cough syrup. Pulmonology consulted for assistance with medical management.

## 2018-01-11 NOTE — PROGRESS NOTES
[facility] Hospital Medicine  Progress Note    Patient Name: Trip Prasad  MRN: [id number]  Patient Class: IP- Inpatient   Admission Date: 1/10/2018  Length of Stay: 0 days  Attending Physician: Deshawn Zabala MD  Primary Care Provider: Caleb Leong MD        Subjective:     Principal Problem:Pneumonia of left lower lobe due to infectious organism    HPI:  Trip Prasad is a 72 y.o. male patient  who was diagnosed and tx for bronchitis x 3 weeks ago (prescribed inhaler and nebulizer for wheezing) with recent diagnosis of influenza per PCP x 3 days ago ( started on Tamiflu) who presented to the Emergency Department with complaints of worsening SOB which onset gradually 3 weeks ago. Symptoms are constant and moderate in severity. No mitigating or exacerbating factors reported. Associated sxs include fatigue, cough, and congestion. Patient denies any fever, chills, diaphoresis, night sweats, headache, chest pain, palpitations, leg swelling, sore throat, rhinorrhea. Initial workup revealed abg with hypoxia. CXR with left lung base pneumonia. No leukocytosis, afebrile. Hospital Medicine consulted for admission.    Hospital Course:  Patient admitted for further treatment of pneumonia and influenza. Patient with continue wheezing. Tachypnea with talking and frequent rest periods with talking. Patient reports slight improvement with breathing. Will continue IV abx and duonebs. Will add IV steroids and Hycoden cough syrup. Pulmonology consulted for assistance with medical management.     Interval History:  No acute events event overnight. Patient with continue wheezing. Tachypnea with talking and frequent rest periods with talking. Patient reports slight improvement with breathing but c/o cough. Will continue IV abx and duonebs. Will add IV steroids and Hycoden cough syrup. Pulmonology consulted for assistance with medical management.     Review of Systems   Constitutional: Negative for activity change,  appetite change, chills, diaphoresis, fatigue, fever and unexpected weight change.   HENT: Negative for congestion, nosebleeds, postnasal drip, rhinorrhea, sinus pain, sinus pressure, sore throat and trouble swallowing.    Eyes: Negative for photophobia, discharge and visual disturbance.   Respiratory: Positive for cough, shortness of breath and wheezing. Negative for choking and chest tightness.    Cardiovascular: Negative for chest pain, palpitations and leg swelling.   Gastrointestinal: Negative for abdominal distention, abdominal pain, constipation, diarrhea, nausea and vomiting.   Endocrine: Negative for cold intolerance and heat intolerance.   Genitourinary: Negative for difficulty urinating, discharge, flank pain, frequency, penile pain and urgency.   Musculoskeletal: Negative for arthralgias, back pain, myalgias and neck pain.   Skin: Negative for color change, pallor, rash and wound.   Allergic/Immunologic: Negative for environmental allergies, food allergies and immunocompromised state.   Neurological: Negative for dizziness, seizures, syncope, weakness, light-headedness, numbness and headaches.   Hematological: Negative for adenopathy. Does not bruise/bleed easily.   Psychiatric/Behavioral: Negative for agitation, confusion and hallucinations. The patient is not nervous/anxious.      Objective:     Vital Signs (Most Recent):  Temp: 98.2 °F (36.8 °C) (01/11/18 1056)  Pulse: 82 (01/11/18 1126)  Resp: 20 (01/11/18 1056)  BP: (!) 143/68 (01/11/18 1056)  SpO2: 95 % (01/11/18 1056) Vital Signs (24h Range):  Temp:  [98.2 °F (36.8 °C)-101.2 °F (38.4 °C)] 98.2 °F (36.8 °C)  Pulse:  [] 82  Resp:  [18-33] 20  SpO2:  [91 %-98 %] 95 %  BP: ()/(55-90) 143/68     Weight: 134.3 kg (296 lb 1.2 oz)  Body mass index is 42.48 kg/m².    Intake/Output Summary (Last 24 hours) at 01/11/18 1231  Last data filed at 01/10/18 1848   Gross per 24 hour   Intake              250 ml   Output                0 ml   Net               250 ml      Physical Exam   Constitutional: He is oriented to person, place, and time. He appears well-developed and well-nourished. No distress.   HENT:   Head: Normocephalic and atraumatic.   Eyes: Conjunctivae and EOM are normal. Pupils are equal, round, and reactive to light.   Neck: Normal range of motion. Neck supple.   Cardiovascular: Normal rate, regular rhythm, normal heart sounds and intact distal pulses.  Exam reveals no gallop and no friction rub.    No murmur heard.  Pulmonary/Chest: Effort normal. No respiratory distress (mild). He has wheezes.   Abdominal: Soft. Bowel sounds are normal. He exhibits no distension. There is no tenderness. There is no guarding.   obese   Genitourinary:   Genitourinary Comments: deferred   Musculoskeletal: Normal range of motion. He exhibits edema.   Neurological: He is alert and oriented to person, place, and time.   Skin: Skin is warm and dry. Capillary refill takes less than 2 seconds. He is not diaphoretic.   Psychiatric: He has a normal mood and affect. His behavior is normal. Judgment and thought content normal.       Significant Labs:   Recent Lab Results       01/11/18  1133 01/11/18  1132 01/11/18  0545 01/11/18  0443 01/11/18  0023      Albumin          Alkaline Phosphatase          Allens Test          ALT          Anion Gap    9      Appearance, UA          AST          Bacteria, UA          Baso #    0.01      Basophil%    0.2      Bilirubin (UA)          Total Bilirubin          Blood Culture, Routine          BNP          Site          BUN, Bld    11      Calcium    8.0(L)      Chloride    107      CO2    21(L)      Color, UA          CPK          Creatinine    0.9      DelSys          Differential Method    Automated      eGFR if     >60      eGFR if non     >60  Comment:  Calculation used to obtain the estimated glomerular filtration  rate (eGFR) is the CKD-EPI equation.         Eos #    0.0      Eosinophil%    0.0       FiO2          Glucose    171(H)      Glucose, UA          Gran #    2.6      Gran%    53.1      Hematocrit    39.9(L)      Hemoglobin    12.9(L)      Hyaline Casts, UA          Ketones, UA          Lactate, Emil     0.9  Comment:  Falsely low lactic acid results can be found in samples   containing >=13.0 mg/dL total bilirubin and/or >=3.5 mg/dL   direct bilirubin.       Leukocytes, UA          Lymph #    1.9      Lymph%    38.4      MCH    28.4      MCHC    32.3      MCV    88      Microscopic Comment          Mode          Mono #    0.4      Mono%    8.3      MPV    9.5      Nitrite, UA          Occult Blood UA          pH, UA          Platelets    124(L)      POC BE          POC HCO3          POC PCO2          POC PH          POC PO2          POC SATURATED O2          POCT Glucose 144(H) 82 189(H)       Potassium    3.3(L)      Total Protein          Protein, UA          RBC    4.55(L)      RBC, UA          RDW    14.8(H)      Sample          Sodium    137      Specific Gravity, UA          Specimen UA          Troponin I          Urobilinogen, UA          WBC, UA          WBC    4.95                  01/10/18  2112 01/10/18  1923 01/10/18  1849 01/10/18  1610 01/10/18  1556      Albumin          Alkaline Phosphatase          Allens Test    Pass      ALT          Anion Gap          Appearance, UA   Clear       AST          Bacteria, UA   Rare       Baso #          Basophil%          Bilirubin (UA)   Negative       Total Bilirubin          Blood Culture, Routine     No Growth to date[P]     BNP          Site    RR      BUN, Bld          Calcium          Chloride          CO2          Color, UA   Yellow       CPK          Creatinine          DelSys    Room Air      Differential Method          eGFR if           eGFR if non           Eos #          Eosinophil%          FiO2    21      Glucose          Glucose, UA   Negative       Gran #          Gran%          Hematocrit           Hemoglobin          Hyaline Casts, UA   0       Ketones, UA   Negative       Lactate, Emil 1.0  Comment:  Falsely low lactic acid results can be found in samples   containing >=13.0 mg/dL total bilirubin and/or >=3.5 mg/dL   direct bilirubin.           Leukocytes, UA   Negative       Lymph #          Lymph%          MCH          MCHC          MCV          Microscopic Comment   SEE COMMENT  Comment:  Other formed elements not mentioned in the report are not   present in the microscopic examination.          Mode    SPONT      Mono #          Mono%          MPV          Nitrite, UA   Negative       Occult Blood UA   Trace(A)       pH, UA   6.0       Platelets          POC BE    -1      POC HCO3    22.8(L)      POC PCO2    34.0(L)      POC PH    7.435      POC PO2    65(L)      POC SATURATED O2    93(L)      POCT Glucose  171(H)        Potassium          Total Protein          Protein, UA   1+  Comment:  Recommend a 24 hour urine protein or a urine   protein/creatinine ratio if globulin induced proteinuria is  clinically suspected.  (A)       RBC          RBC, UA   0       RDW          Sample    ARTERIAL      Sodium          Specific [place], UA   1.025       Specimen UA   Urine, Clean Catch       Troponin I          Urobilinogen, UA   Negative       WBC, UA   2       WBC                      01/10/18  1555 01/10/18  1554      Albumin  3.2(L)     Alkaline Phosphatase  86     Allens Test       ALT  34     Anion Gap  12     Appearance, UA       AST  35     Bacteria, UA       Baso #  0.01     Basophil%  0.2     Bilirubin (UA)       Total Bilirubin  0.6  Comment:  For infants and newborns, interpretation of results should be based  on gestational age, weight and in agreement with clinical  observations.  Premature Infant recommended reference ranges:  Up to 24 hours.............<8.0 mg/dL  Up to 48 hours............<12.0 mg/dL  3-5 days..................<15.0 mg/dL  6-29 days.................<15.0 mg/dL       Blood Culture,  Routine No Growth to date[P]      BNP  <10  Comment:  Values of less than 100 pg/ml are consistent with non-CHF populations.     Site       BUN, Bld  19     Calcium  8.9     Chloride  100     CO2  25     Color, UA       CPK  184     Creatinine  1.3     DelSys       Differential Method  Automated     eGFR if   >60     eGFR if non   55  Comment:  Calculation used to obtain the estimated glomerular filtration  rate (eGFR) is the CKD-EPI equation.   (A)     Eos #  0.0     Eosinophil%  0.2     FiO2       Glucose  204(H)     Glucose, UA       Gran #  4.2     Gran%  64.5     Hematocrit  44.2     Hemoglobin  14.7     Hyaline Casts, UA       Ketones, UA       Lactate, Emil  1.3  Comment:  Falsely low lactic acid results can be found in samples   containing >=13.0 mg/dL total bilirubin and/or >=3.5 mg/dL   direct bilirubin.       Leukocytes, UA       Lymph #  1.8     Lymph%  26.9     MCH  28.8     MCHC  33.3     MCV  87     Microscopic Comment       Mode       Mono #  0.5     Mono%  8.2     MPV  9.8     Nitrite, UA       Occult Blood UA       pH, UA       Platelets  140(L)     POC BE       POC HCO3       POC PCO2       POC PH       POC PO2       POC SATURATED O2       POCT Glucose       Potassium  3.5     Total Protein  7.2     Protein, UA       RBC  5.11     RBC, UA       RDW  14.6(H)     Sample       Sodium  137     Specific Gravity, UA       Specimen UA       Troponin I  <0.006  Comment:  The reference interval for Troponin I represents the 99th percentile   cutoff   for our facility and is consistent with 3rd generation assay   performance.       Urobilinogen, UA       WBC, UA       WBC  6.50           Significant Imaging:   Imaging Results          X-Ray Chest AP Portable (Final result)  Result time 01/10/18 15:13:25    Final result by Germaine Long MD (01/10/18 15:13:25)                 Impression:      Probable left lung base consolidation.      Electronically signed by: GERMAINE  GOLD CAMP  Date:     01/10/18  Time:    15:13              Narrative:    Exam: XR CHEST AP PORTABLE,    Date:  01/10/18 15:08:07    History: sob    Comparison:  09/12/2016    Findings: Cardiac size is normal.  There is elevation of the right diaphragm.  The right lung is clear.    Loss of definition of the left diaphragm with blunting of the left costophrenic angle.  Probable left lung base consolidation.                            Assessment/Plan:      * Pneumonia of left lower lobe due to infectious organism    -CXR in ED consistent with LLL pneumonia  -Avelox initiated in ED  -afebril, no leukocytosis  -Duonebs  -supplemental O2 to keep O2 sat > 92%  -telemetry monitor  -frequent vital signs  -daily CBC, BMP  -Admit Obs    1/11/18: .Patient with continue wheezing. Tachypnea with talking and frequent rest periods with talking. Patient reports slight improvement with breathing. Will continue IV abx and duonebs. Will add IV steroids and Hycoden cough syrup. Pulmonology consulted for assistance with medical management.               Influenza    Diagnoses at PCP office x 3 days ago  -resume Tamiflu          Diabetes mellitus, type 2    -accu check  -ISS  -continue to monitor                VTE Risk Mitigation         Ordered     Medium Risk of VTE  Once      01/10/18 1715     Place sequential compression device  Until discontinued      01/10/18 1715              Naseem Ramirez DNP, ACNP-Bc, CCRN  Department of Hospital Medicine   [facility]

## 2018-01-12 PROBLEM — R91.8 LUNG INFILTRATE ON CT: Status: ACTIVE | Noted: 2018-01-12

## 2018-01-12 LAB
DIASTOLIC DYSFUNCTION: NO
POCT GLUCOSE: 375 MG/DL (ref 70–110)
POCT GLUCOSE: 403 MG/DL (ref 70–110)
POCT GLUCOSE: 425 MG/DL (ref 70–110)
RETIRED EF AND QEF - SEE NOTES: 60 (ref 55–65)

## 2018-01-12 PROCEDURE — 86255 FLUORESCENT ANTIBODY SCREEN: CPT | Mod: 91

## 2018-01-12 PROCEDURE — 94640 AIRWAY INHALATION TREATMENT: CPT

## 2018-01-12 PROCEDURE — 99232 SBSQ HOSP IP/OBS MODERATE 35: CPT | Mod: ,,, | Performed by: INTERNAL MEDICINE

## 2018-01-12 PROCEDURE — 25000003 PHARM REV CODE 250: Performed by: FAMILY MEDICINE

## 2018-01-12 PROCEDURE — 36415 COLL VENOUS BLD VENIPUNCTURE: CPT

## 2018-01-12 PROCEDURE — 25000003 PHARM REV CODE 250: Performed by: HOSPITALIST

## 2018-01-12 PROCEDURE — 63600175 PHARM REV CODE 636 W HCPCS: Performed by: EMERGENCY MEDICINE

## 2018-01-12 PROCEDURE — 25000242 PHARM REV CODE 250 ALT 637 W/ HCPCS: Performed by: EMERGENCY MEDICINE

## 2018-01-12 PROCEDURE — 86038 ANTINUCLEAR ANTIBODIES: CPT

## 2018-01-12 PROCEDURE — 21400001 HC TELEMETRY ROOM

## 2018-01-12 PROCEDURE — C8929 TTE W OR WO FOL WCON,DOPPLER: HCPCS

## 2018-01-12 PROCEDURE — 63600175 PHARM REV CODE 636 W HCPCS: Performed by: FAMILY MEDICINE

## 2018-01-12 PROCEDURE — 94761 N-INVAS EAR/PLS OXIMETRY MLT: CPT

## 2018-01-12 PROCEDURE — 27000221 HC OXYGEN, UP TO 24 HOURS

## 2018-01-12 PROCEDURE — 86003 ALLG SPEC IGE CRUDE XTRC EA: CPT

## 2018-01-12 PROCEDURE — 25000003 PHARM REV CODE 250: Performed by: EMERGENCY MEDICINE

## 2018-01-12 PROCEDURE — 63600175 PHARM REV CODE 636 W HCPCS: Performed by: INTERNAL MEDICINE

## 2018-01-12 PROCEDURE — 86331 IMMUNODIFFUSION OUCHTERLONY: CPT

## 2018-01-12 PROCEDURE — 93306 TTE W/DOPPLER COMPLETE: CPT | Mod: 26,,, | Performed by: INTERNAL MEDICINE

## 2018-01-12 RX ORDER — PREDNISONE 20 MG/1
20 TABLET ORAL DAILY
Status: DISCONTINUED | OUTPATIENT
Start: 2018-01-12 | End: 2018-01-14 | Stop reason: HOSPADM

## 2018-01-12 RX ORDER — FUROSEMIDE 10 MG/ML
40 INJECTION INTRAMUSCULAR; INTRAVENOUS ONCE
Status: COMPLETED | OUTPATIENT
Start: 2018-01-12 | End: 2018-01-12

## 2018-01-12 RX ORDER — INSULIN ASPART 100 [IU]/ML
1-10 INJECTION, SOLUTION INTRAVENOUS; SUBCUTANEOUS
Status: DISCONTINUED | OUTPATIENT
Start: 2018-01-12 | End: 2018-01-14 | Stop reason: HOSPADM

## 2018-01-12 RX ADMIN — PANTOPRAZOLE SODIUM 40 MG: 40 TABLET, DELAYED RELEASE ORAL at 08:01

## 2018-01-12 RX ADMIN — OSELTAMIVIR PHOSPHATE 75 MG: 75 CAPSULE ORAL at 09:01

## 2018-01-12 RX ADMIN — INSULIN ASPART 10 UNITS: 100 INJECTION, SOLUTION INTRAVENOUS; SUBCUTANEOUS at 04:01

## 2018-01-12 RX ADMIN — HYDROCODONE BITARTRATE AND HOMATROPINE METHYLBROMIDE 2 ML: 5; 1.5 SOLUTION ORAL at 03:01

## 2018-01-12 RX ADMIN — PANTOPRAZOLE SODIUM 40 MG: 40 TABLET, DELAYED RELEASE ORAL at 09:01

## 2018-01-12 RX ADMIN — PREDNISONE 20 MG: 20 TABLET ORAL at 02:01

## 2018-01-12 RX ADMIN — HYDROCODONE BITARTRATE AND HOMATROPINE METHYLBROMIDE 2 ML: 5; 1.5 SOLUTION ORAL at 06:01

## 2018-01-12 RX ADMIN — HYDROCODONE BITARTRATE AND HOMATROPINE METHYLBROMIDE 2 ML: 5; 1.5 SOLUTION ORAL at 09:01

## 2018-01-12 RX ADMIN — IPRATROPIUM BROMIDE AND ALBUTEROL SULFATE 3 ML: .5; 3 SOLUTION RESPIRATORY (INHALATION) at 12:01

## 2018-01-12 RX ADMIN — IPRATROPIUM BROMIDE AND ALBUTEROL SULFATE 3 ML: .5; 3 SOLUTION RESPIRATORY (INHALATION) at 09:01

## 2018-01-12 RX ADMIN — CEFTRIAXONE 1 G: 1 INJECTION, SOLUTION INTRAVENOUS at 09:01

## 2018-01-12 RX ADMIN — DOXYCYCLINE HYCLATE 100 MG: 100 TABLET, COATED ORAL at 08:01

## 2018-01-12 RX ADMIN — INSULIN ASPART 5 UNITS: 100 INJECTION, SOLUTION INTRAVENOUS; SUBCUTANEOUS at 09:01

## 2018-01-12 RX ADMIN — IPRATROPIUM BROMIDE AND ALBUTEROL SULFATE 3 ML: .5; 3 SOLUTION RESPIRATORY (INHALATION) at 07:01

## 2018-01-12 RX ADMIN — METHYLPREDNISOLONE SODIUM SUCCINATE 40 MG: 40 INJECTION, POWDER, FOR SOLUTION INTRAMUSCULAR; INTRAVENOUS at 06:01

## 2018-01-12 RX ADMIN — DOXYCYCLINE HYCLATE 100 MG: 100 TABLET, COATED ORAL at 09:01

## 2018-01-12 RX ADMIN — IPRATROPIUM BROMIDE AND ALBUTEROL SULFATE 3 ML: .5; 3 SOLUTION RESPIRATORY (INHALATION) at 03:01

## 2018-01-12 RX ADMIN — HYDROCODONE BITARTRATE AND HOMATROPINE METHYLBROMIDE 2 ML: 5; 1.5 SOLUTION ORAL at 02:01

## 2018-01-12 RX ADMIN — ENOXAPARIN SODIUM 40 MG: 100 INJECTION SUBCUTANEOUS at 04:01

## 2018-01-12 RX ADMIN — FUROSEMIDE 40 MG: 10 INJECTION, SOLUTION INTRAMUSCULAR; INTRAVENOUS at 02:01

## 2018-01-12 RX ADMIN — OSELTAMIVIR PHOSPHATE 75 MG: 75 CAPSULE ORAL at 08:01

## 2018-01-12 RX ADMIN — INSULIN ASPART 3 UNITS: 100 INJECTION, SOLUTION INTRAVENOUS; SUBCUTANEOUS at 06:01

## 2018-01-12 RX ADMIN — INSULIN ASPART 10 UNITS: 100 INJECTION, SOLUTION INTRAVENOUS; SUBCUTANEOUS at 02:01

## 2018-01-12 RX ADMIN — IPRATROPIUM BROMIDE AND ALBUTEROL SULFATE 3 ML: .5; 3 SOLUTION RESPIRATORY (INHALATION) at 01:01

## 2018-01-12 RX ADMIN — IPRATROPIUM BROMIDE AND ALBUTEROL SULFATE 3 ML: .5; 3 SOLUTION RESPIRATORY (INHALATION) at 04:01

## 2018-01-12 NOTE — PLAN OF CARE
Problem: Patient Care Overview  Goal: Plan of Care Review  Outcome: Ongoing (interventions implemented as appropriate)  Plan of care reviewed with patient. Patient stable. Aaox3. Patient free from falls fall precautions in place. Assist x 1 to the bathroom. Call be;; and belongings with in reach reminded to call for assistance. Droplet precautions maintained.  nsr on monitor. Will cont to monitor

## 2018-01-12 NOTE — ASSESSMENT & PLAN NOTE
Features on early UIP on CT  Other differential is Hypersensitivity  Send of DOMINIC, ANCA, Aspe ag  Need Out patient PFT, 6MWD

## 2018-01-12 NOTE — SUBJECTIVE & OBJECTIVE
Interval History:   Somewhat better  Still SOB    Review of Systems   Constitutional: Positive for malaise/fatigue.   Eyes: Negative.    Respiratory:        Wheeze and NATH   Gastrointestinal: Negative.    Genitourinary: Negative.    Musculoskeletal: Negative.    Neurological: Negative.    Endo/Heme/Allergies: Negative.    Psychiatric/Behavioral: Negative.        Objective:     Vital Signs (Most Recent):  Temp: 97.9 °F (36.6 °C) (01/12/18 0744)  Pulse: 85 (01/12/18 1214)  Resp: 18 (01/12/18 1214)  BP: (!) 147/69 (01/12/18 1114)  SpO2: 95 % (01/12/18 1214) Vital Signs (24h Range):  Temp:  [97.4 °F (36.3 °C)-99 °F (37.2 °C)] 97.9 °F (36.6 °C)  Pulse:  [74-92] 85  Resp:  [18-20] 18  SpO2:  [91 %-96 %] 95 %  BP: (147-163)/(63-89) 147/69     Weight: 132.9 kg (292 lb 15.9 oz)  Body mass index is 42.04 kg/m².    No intake or output data in the 24 hours ending 01/12/18 1349    Physical Exam   Constitutional: He is oriented to person, place, and time. Vital signs are normal. He appears well-developed and well-nourished.   HENT:   Head: Normocephalic and atraumatic.   Nose: Nose normal.   Mouth/Throat: Oropharynx is clear and moist.   Eyes: EOM are normal. Pupils are equal, round, and reactive to light.   Neck: Normal range of motion. Neck supple.   Cardiovascular: Normal rate, regular rhythm and normal heart sounds.    No murmur heard.  Pulmonary/Chest: Effort normal. No respiratory distress. He has decreased breath sounds. He has no wheezes. He has no rhonchi. He has no rales.   Abdominal: Soft. Bowel sounds are normal.   Musculoskeletal: He exhibits edema.   Neurological: He is alert and oriented to person, place, and time.   Skin: Skin is warm and dry. Capillary refill takes 2 to 3 seconds.   Nursing note and vitals reviewed.      Vents:  Oxygen Concentration (%): 36 (01/12/18 1214)    Lines/Drains/Airways     Peripheral Intravenous Line                 Peripheral IV - Single Lumen 01/10/18 1550 Right Wrist 1 day                 Significant Labs:    CBC/Anemia Profile:    Recent Labs  Lab 01/10/18  1554 01/11/18  0443   WBC 6.50 4.95   HGB 14.7 12.9*   HCT 44.2 39.9*   * 124*   MCV 87 88   RDW 14.6* 14.8*        Chemistries:    Recent Labs  Lab 01/10/18  1554 01/11/18  0443    137   K 3.5 3.3*    107   CO2 25 21*   BUN 19 11   CREATININE 1.3 0.9   CALCIUM 8.9 8.0*   ALBUMIN 3.2*  --    PROT 7.2  --    BILITOT 0.6  --    ALKPHOS 86  --    ALT 34  --    AST 35  --        All pertinent labs within the past 24 hours have been reviewed.    Significant Imaging:  I have reviewed all pertinent imaging results/findings within the past 24 hours.     Chest CT  -Lungs: Subpleural reticular opacities are seen throughout the lower lobes which can be seen with UIP. Slightly decreased lung volumes are noted. No evidence of fibrosis or bronchiectasis. Mild thickening of the bronchovascular bundle within the perihilar regions.  -Pleura: No thickening or fluid.  -Mediastinum/Angie:Shotty nodes which are non-pathologically enlarged   -Axilla: No adenopathy.  -Thyroid: 1.2 cm left thyroid nodule.  -Heart/Aorta: No pericardial effusion. Aorta normal caliber.  -Bones/Chest Wall: Mild degenerative changes at   -Upper Abdomen: Decreased attenuation liver is consistent with hepatic steatosis.   Impression          Subpleural reticular opacities are seen throughout the lower lobes which can be seen with UIP.     Hepatic steatosis.    All CT scans at this facility use dose modulation, iterative reconstruction and/or weight based dosing when appropriate to reduce radiation dose to as low as reasonably achievable.

## 2018-01-12 NOTE — PROGRESS NOTES
[facility]  Pulmonology  Progress Note    Patient Name: Trip Prasad  MRN: [id number]  Admission Date: 1/10/2018  Hospital Length of Stay: 1 days  Code Status: Full Code  Attending Provider: Deshawn Zabala MD  Primary Care Provider: Caleb Leong MD   Principal Problem: Pneumonia of left lower lobe due to infectious organism    Subjective:     Son at bedside, reviewed imaging, easily SOB. SpO2 was 94% on RA  CT neck and chest reviewed  Concern about blood sugars,     Interval History:   Somewhat better  Still SOB    Review of Systems   Constitutional: Positive for malaise/fatigue.   Eyes: Negative.    Respiratory:        Wheeze and NATH   Gastrointestinal: Negative.    Genitourinary: Negative.    Musculoskeletal: Negative.    Neurological: Negative.    Endo/Heme/Allergies: Negative.    Psychiatric/Behavioral: Negative.        Objective:     Vital Signs (Most Recent):  Temp: 97.9 °F (36.6 °C) (01/12/18 0744)  Pulse: 85 (01/12/18 1214)  Resp: 18 (01/12/18 1214)  BP: (!) 147/69 (01/12/18 1114)  SpO2: 95 % (01/12/18 1214) Vital Signs (24h Range):  Temp:  [97.4 °F (36.3 °C)-99 °F (37.2 °C)] 97.9 °F (36.6 °C)  Pulse:  [74-92] 85  Resp:  [18-20] 18  SpO2:  [91 %-96 %] 95 %  BP: (147-163)/(63-89) 147/69     Weight: 132.9 kg (292 lb 15.9 oz)  Body mass index is 42.04 kg/m².    No intake or output data in the 24 hours ending 01/12/18 1349    Physical Exam   Constitutional: He is oriented to person, place, and time. Vital signs are normal. He appears well-developed and well-nourished.   HENT:   Head: Normocephalic and atraumatic.   Nose: Nose normal.   Mouth/Throat: Oropharynx is clear and moist.   Eyes: EOM are normal. Pupils are equal, round, and reactive to light.   Neck: Normal range of motion. Neck supple.   Cardiovascular: Normal rate, regular rhythm and normal heart sounds.    No murmur heard.  Pulmonary/Chest: Effort normal. No respiratory distress. He has decreased breath sounds. He has no wheezes. He  has no rhonchi. He has no rales.   Abdominal: Soft. Bowel sounds are normal.   Musculoskeletal: He exhibits edema.   Neurological: He is alert and oriented to person, place, and time.   Skin: Skin is warm and dry. Capillary refill takes 2 to 3 seconds.   Nursing note and vitals reviewed.      Vents:  Oxygen Concentration (%): 36 (01/12/18 1214)    Lines/Drains/Airways     Peripheral Intravenous Line                 Peripheral IV - Single Lumen 01/10/18 1550 Right Wrist 1 day                Significant Labs:    CBC/Anemia Profile:    Recent Labs  Lab 01/10/18  1554 01/11/18  0443   WBC 6.50 4.95   HGB 14.7 12.9*   HCT 44.2 39.9*   * 124*   MCV 87 88   RDW 14.6* 14.8*        Chemistries:    Recent Labs  Lab 01/10/18  1554 01/11/18  0443    137   K 3.5 3.3*    107   CO2 25 21*   BUN 19 11   CREATININE 1.3 0.9   CALCIUM 8.9 8.0*   ALBUMIN 3.2*  --    PROT 7.2  --    BILITOT 0.6  --    ALKPHOS 86  --    ALT 34  --    AST 35  --        All pertinent labs within the past 24 hours have been reviewed.    Significant Imaging:  I have reviewed all pertinent imaging results/findings within the past 24 hours.     Chest CT  -Lungs: Subpleural reticular opacities are seen throughout the lower lobes which can be seen with UIP. Slightly decreased lung volumes are noted. No evidence of fibrosis or bronchiectasis. Mild thickening of the bronchovascular bundle within the perihilar regions.  -Pleura: No thickening or fluid.  -Mediastinum/Angie:Shotty nodes which are non-pathologically enlarged   -Axilla: No adenopathy.  -Thyroid: 1.2 cm left thyroid nodule.  -Heart/Aorta: No pericardial effusion. Aorta normal caliber.  -Bones/Chest Wall: Mild degenerative changes at   -Upper Abdomen: Decreased attenuation liver is consistent with hepatic steatosis.   Impression          Subpleural reticular opacities are seen throughout the lower lobes which can be seen with UIP.     Hepatic steatosis.    All CT scans at this facility  use dose modulation, iterative reconstruction and/or weight based dosing when appropriate to reduce radiation dose to as low as reasonably achievable.             Assessment/Plan:      I have reviewed all labs and imaging studies and compared to previous results. I have also discussed labs with all the teams in the medical care of the patient and my plan is outlined below     * Pneumonia of left lower lobe due to infectious organism    Continue Abx: Ceftriaxone and Doxycycline  Sputum culture  immunizations are current          Influenza    Droplet isolation  Tamiflu 5 days        Wheezing without diagnosis of asthma    Wheeze over vocal cords  Either VCD,   May need ENT eval    [facility] Hx doesn't support asthma or RAD    CT neck was unremarkable  Continue steroids ( changed to PO), Nebs        Lung infiltrate on CT    Features on early UIP on CT  Other differential is Hypersensitivity  Send of DOMINIC, ANCA, Aspe ag  Need Out patient PFT, 6MWD        NATH (dyspnea on exertion)    Echo  x1 dose lasix        Cough    According to son: SOB and Wheezing> 4 weeks          Discussed diagnosis, its evaluation, treatment and usual course. All questions answered.       Taqueria Garcia MD  Pulmonology  [facility]

## 2018-01-12 NOTE — ASSESSMENT & PLAN NOTE
Wheeze over vocal cords  Either VCD,   May need ENT eval    [facility] Hx doesn't support asthma or RAD    CT neck was unremarkable  Continue steroids ( changed to PO), Nebs

## 2018-01-13 PROBLEM — R79.89 POSITIVE D DIMER: Status: ACTIVE | Noted: 2018-01-13

## 2018-01-13 LAB
D DIMER PPP IA.FEU-MCNC: 0.66 MG/L FEU
POCT GLUCOSE: 193 MG/DL (ref 70–110)
POCT GLUCOSE: 212 MG/DL (ref 70–110)
POCT GLUCOSE: 232 MG/DL (ref 70–110)
POCT GLUCOSE: 290 MG/DL (ref 70–110)
POCT GLUCOSE: 360 MG/DL (ref 70–110)

## 2018-01-13 PROCEDURE — 25000003 PHARM REV CODE 250: Performed by: EMERGENCY MEDICINE

## 2018-01-13 PROCEDURE — 99232 SBSQ HOSP IP/OBS MODERATE 35: CPT | Mod: ,,, | Performed by: INTERNAL MEDICINE

## 2018-01-13 PROCEDURE — G8984 CARRY CURRENT STATUS: HCPCS | Mod: CI

## 2018-01-13 PROCEDURE — 25000003 PHARM REV CODE 250: Performed by: HOSPITALIST

## 2018-01-13 PROCEDURE — 97161 PT EVAL LOW COMPLEX 20 MIN: CPT

## 2018-01-13 PROCEDURE — 36415 COLL VENOUS BLD VENIPUNCTURE: CPT

## 2018-01-13 PROCEDURE — 63600175 PHARM REV CODE 636 W HCPCS: Performed by: INTERNAL MEDICINE

## 2018-01-13 PROCEDURE — 99900038 HC OT GENERIC THERAPY SCREENING (STAT)

## 2018-01-13 PROCEDURE — G8985 CARRY GOAL STATUS: HCPCS | Mod: CH

## 2018-01-13 PROCEDURE — G8979 MOBILITY GOAL STATUS: HCPCS | Mod: CI

## 2018-01-13 PROCEDURE — 25000242 PHARM REV CODE 250 ALT 637 W/ HCPCS: Performed by: EMERGENCY MEDICINE

## 2018-01-13 PROCEDURE — 97165 OT EVAL LOW COMPLEX 30 MIN: CPT

## 2018-01-13 PROCEDURE — 27000221 HC OXYGEN, UP TO 24 HOURS

## 2018-01-13 PROCEDURE — 94640 AIRWAY INHALATION TREATMENT: CPT

## 2018-01-13 PROCEDURE — G8978 MOBILITY CURRENT STATUS: HCPCS | Mod: CI

## 2018-01-13 PROCEDURE — 21400001 HC TELEMETRY ROOM

## 2018-01-13 PROCEDURE — 25500020 PHARM REV CODE 255: Performed by: EMERGENCY MEDICINE

## 2018-01-13 PROCEDURE — 85379 FIBRIN DEGRADATION QUANT: CPT

## 2018-01-13 PROCEDURE — 25000003 PHARM REV CODE 250: Performed by: FAMILY MEDICINE

## 2018-01-13 PROCEDURE — G8986 CARRY D/C STATUS: HCPCS | Mod: CH

## 2018-01-13 RX ORDER — MOXIFLOXACIN HYDROCHLORIDE 400 MG/1
400 TABLET ORAL DAILY
Status: DISCONTINUED | OUTPATIENT
Start: 2018-01-14 | End: 2018-01-14 | Stop reason: HOSPADM

## 2018-01-13 RX ADMIN — DOXYCYCLINE HYCLATE 100 MG: 100 TABLET, COATED ORAL at 07:01

## 2018-01-13 RX ADMIN — IOHEXOL 100 ML: 350 INJECTION, SOLUTION INTRAVENOUS at 10:01

## 2018-01-13 RX ADMIN — HYDROCODONE BITARTRATE AND HOMATROPINE METHYLBROMIDE 2 ML: 5; 1.5 SOLUTION ORAL at 11:01

## 2018-01-13 RX ADMIN — INSULIN ASPART 10 UNITS: 100 INJECTION, SOLUTION INTRAVENOUS; SUBCUTANEOUS at 05:01

## 2018-01-13 RX ADMIN — OSELTAMIVIR PHOSPHATE 75 MG: 75 CAPSULE ORAL at 08:01

## 2018-01-13 RX ADMIN — HYDROCODONE BITARTRATE AND HOMATROPINE METHYLBROMIDE 2 ML: 5; 1.5 SOLUTION ORAL at 05:01

## 2018-01-13 RX ADMIN — IPRATROPIUM BROMIDE AND ALBUTEROL SULFATE 3 ML: .5; 3 SOLUTION RESPIRATORY (INHALATION) at 11:01

## 2018-01-13 RX ADMIN — HYDROCODONE BITARTRATE AND HOMATROPINE METHYLBROMIDE 2 ML: 5; 1.5 SOLUTION ORAL at 08:01

## 2018-01-13 RX ADMIN — INSULIN ASPART 4 UNITS: 100 INJECTION, SOLUTION INTRAVENOUS; SUBCUTANEOUS at 11:01

## 2018-01-13 RX ADMIN — IPRATROPIUM BROMIDE AND ALBUTEROL SULFATE 3 ML: .5; 3 SOLUTION RESPIRATORY (INHALATION) at 05:01

## 2018-01-13 RX ADMIN — PREDNISONE 20 MG: 20 TABLET ORAL at 07:01

## 2018-01-13 RX ADMIN — IPRATROPIUM BROMIDE AND ALBUTEROL SULFATE 3 ML: .5; 3 SOLUTION RESPIRATORY (INHALATION) at 12:01

## 2018-01-13 RX ADMIN — PANTOPRAZOLE SODIUM 40 MG: 40 TABLET, DELAYED RELEASE ORAL at 08:01

## 2018-01-13 RX ADMIN — PANTOPRAZOLE SODIUM 40 MG: 40 TABLET, DELAYED RELEASE ORAL at 07:01

## 2018-01-13 RX ADMIN — HYDROCODONE BITARTRATE AND HOMATROPINE METHYLBROMIDE 2 ML: 5; 1.5 SOLUTION ORAL at 07:01

## 2018-01-13 RX ADMIN — HYDROCODONE BITARTRATE AND HOMATROPINE METHYLBROMIDE 2 ML: 5; 1.5 SOLUTION ORAL at 01:01

## 2018-01-13 RX ADMIN — ENOXAPARIN SODIUM 40 MG: 100 INJECTION SUBCUTANEOUS at 05:01

## 2018-01-13 RX ADMIN — OSELTAMIVIR PHOSPHATE 75 MG: 75 CAPSULE ORAL at 07:01

## 2018-01-13 RX ADMIN — INSULIN ASPART 2 UNITS: 100 INJECTION, SOLUTION INTRAVENOUS; SUBCUTANEOUS at 05:01

## 2018-01-13 RX ADMIN — INSULIN ASPART 2 UNITS: 100 INJECTION, SOLUTION INTRAVENOUS; SUBCUTANEOUS at 08:01

## 2018-01-13 NOTE — SUBJECTIVE & OBJECTIVE
Interval History:   Marginal better but still SOB    Review of Systems   Constitutional: Positive for malaise/fatigue.   HENT: Negative.    Eyes: Negative.    Respiratory:        Sob   Gastrointestinal: Negative.    Genitourinary: Negative.    Musculoskeletal: Negative.    Skin: Negative.    Neurological: Negative.    Endo/Heme/Allergies: Negative.    Psychiatric/Behavioral: Negative.        Objective:     Vital Signs (Most Recent):  Temp: 98.4 °F (36.9 °C) (01/13/18 0753)  Pulse: 85 (01/13/18 0753)  Resp: 20 (01/13/18 0753)  BP: 137/71 (01/13/18 0753)  SpO2: 95 % (01/13/18 0753) Vital Signs (24h Range):  Temp:  [97.3 °F (36.3 °C)-98.4 °F (36.9 °C)] 98.4 °F (36.9 °C)  Pulse:  [72-92] 85  Resp:  [18-20] 20  SpO2:  [91 %-96 %] 95 %  BP: (116-147)/(58-76) 137/71     Weight: 132.9 kg (292 lb 15.9 oz)  Body mass index is 42.04 kg/m².    No intake or output data in the 24 hours ending 01/13/18 0934    Physical Exam   Constitutional: He is oriented to person, place, and time. Vital signs are normal. He appears well-developed and well-nourished.   HENT:   Head: Normocephalic and atraumatic.   Nose: Nose normal.   Mouth/Throat: Oropharynx is clear and moist.   Eyes: EOM are normal. Pupils are equal, round, and reactive to light.   Neck: Normal range of motion. Neck supple.   Cardiovascular: Normal rate, regular rhythm and normal heart sounds.    No murmur heard.  Pulmonary/Chest: Effort normal. No respiratory distress. He has decreased breath sounds. He has no wheezes. He has no rhonchi. He has no rales.   Abdominal: Soft. Bowel sounds are normal.   Musculoskeletal: He exhibits no edema.   Neurological: He is alert and oriented to person, place, and time.   Skin: Skin is warm and dry. Capillary refill takes 2 to 3 seconds.   Nursing note and vitals reviewed.      Vents:  Oxygen Concentration (%): 36 (01/13/18 0500)    Lines/Drains/Airways     Peripheral Intravenous Line                 Peripheral IV - Single Lumen 01/10/18  1550 Right Wrist 2 days                Significant Labs:    CBC/Anemia Profile:  No results for input(s): WBC, HGB, HCT, PLT, MCV, RDW, IRON, FERRITIN, RETIC, FOLATE, [id number], OCCULTBLOOD in the last 48 hours.     Chemistries:  No results for input(s): NA, K, CL, CO2, BUN, CREATININE, CALCIUM, ALBUMIN, PROT, BILITOT, ALKPHOS, ALT, AST, GLUCOSE, MG, PHOS in the last 48 hours.    All pertinent labs within the past 24 hours have been reviewed.    Significant Imaging:  I have reviewed all pertinent imaging results/findings within the past 24 hours.

## 2018-01-13 NOTE — PROGRESS NOTES
[facility] Hospital Medicine  Progress Note    Patient Name: Trip Prasad  MRN: [id number]  Patient Class: IP- Inpatient   Admission Date: 1/10/2018  Length of Stay: 1 days  Attending Physician: Deshawn Zabala MD  Primary Care Provider: Caleb Leong MD        Subjective:     Principal Problem:Pneumonia of left lower lobe due to infectious organism    HPI:  Trip Prasad is a 72 y.o. male patient  who was diagnosed and tx for bronchitis x 3 weeks ago (prescribed inhaler and nebulizer for wheezing) with recent diagnosis of influenza per PCP x 3 days ago ( started on Tamiflu) who presented to the Emergency Department with complaints of worsening SOB which onset gradually 3 weeks ago. Symptoms are constant and moderate in severity. No mitigating or exacerbating factors reported. Associated sxs include fatigue, cough, and congestion. Patient denies any fever, chills, diaphoresis, night sweats, headache, chest pain, palpitations, leg swelling, sore throat, rhinorrhea. Initial workup revealed abg with hypoxia. CXR with left lung base pneumonia. No leukocytosis, afebrile. Hospital Medicine consulted for admission.    Hospital Course:  Patient admitted for further treatment of pneumonia and influenza. Patient with continue wheezing. Tachypnea with talking and frequent rest periods with talking. Patient reports slight improvement with breathing. Will continue IV abx and duonebs. Will add IV steroids and Hycoden cough syrup. Pulmonology consulted for assistance with medical management.     1/12/18- feels a little better, cough, SOB a little better, still has mostly dry cough and coughing spells. CT neck pending, Chest CT shows some UIP picture. Dr. Garcia following.    Interval History: a little better but still has cough and SOB, w/u in progress.    Review of Systems   Constitutional: Positive for fatigue. Negative for unexpected weight change.   HENT: Negative.    Eyes: Negative.    Respiratory:  Positive for cough and wheezing. Negative for apnea.    Cardiovascular: Negative.    Gastrointestinal: Negative.    Endocrine: Negative.    Genitourinary: Negative.    Musculoskeletal: Negative.    Skin: Negative.    Allergic/Immunologic: Negative.    Neurological: Negative.    Hematological: Negative.    Psychiatric/Behavioral: Negative.      Objective:     Vital Signs (Most Recent):  Temp: 97.9 °F (36.6 °C) (01/12/18 0744)  Pulse: 88 (01/12/18 1607)  Resp: 18 (01/12/18 1607)  BP: 127/60 (01/12/18 1607)  SpO2: (!) 94 % (01/12/18 1607) Vital Signs (24h Range):  Temp:  [97.4 °F (36.3 °C)-98.3 °F (36.8 °C)] 97.9 °F (36.6 °C)  Pulse:  [74-91] 88  Resp:  [18-20] 18  SpO2:  [91 %-96 %] 94 %  BP: (127-163)/(60-89) 127/60     Weight: 132.9 kg (292 lb 15.9 oz)  Body mass index is 42.04 kg/m².  No intake or output data in the 24 hours ending 01/12/18 1822   Physical Exam   Constitutional: He is oriented to person, place, and time. Vital signs are normal. He appears well-developed and well-nourished.   Morbidly obese elderly man in mild resp discomfort, walking in the room   HENT:   Head: Normocephalic and atraumatic.   Nose: Nose normal.   Mouth/Throat: Oropharynx is clear and moist.   Eyes: Conjunctivae and EOM are normal. Pupils are equal, round, and reactive to light.   Neck: Normal range of motion. Neck supple.   Cardiovascular: Normal rate, regular rhythm, normal heart sounds and intact distal pulses.  Exam reveals no gallop and no friction rub.    No murmur heard.  Pulmonary/Chest: He is in respiratory distress. He has decreased breath sounds. He has wheezes. He has no rhonchi. He has no rales.   Abdominal: Soft. Bowel sounds are normal. He exhibits no distension. There is no tenderness.   Genitourinary:   Genitourinary Comments: deferred   Musculoskeletal: He exhibits edema.   Neurological: He is alert and oriented to person, place, and time.   Skin: Skin is warm and dry. Capillary refill takes 2 to 3 seconds.    Psychiatric: He has a normal mood and affect. His behavior is normal. Judgment and thought content normal.   Nursing note and vitals reviewed.      Significant Labs:   ABGs:   Blood Culture:   BMP:   Recent Labs  Lab 01/11/18  0443   *      K 3.3*      CO2 21*   BUN 11   CREATININE 0.9   CALCIUM 8.0*     CBC:   Recent Labs  Lab 01/11/18  0443   WBC 4.95   HGB 12.9*   HCT 39.9*   *     Respiratory Culture:   Recent Labs  Lab 01/11/18  1748   GSRESP <10 epithelial cells per low power field.  Few WBC's  No organisms seen     All pertinent labs within the past 24 hours have been reviewed.  2D echo with color flow doppler   Order: [id number]   Status:  Final result   Visible to patient:  No (Not Released) Next appt:  01/18/2018 at 08:00 AM in Pulmonology (Elizabeth Lejeune, NP) Dx:  SOB (shortness of breath)   Details     Narrative     Date of Procedure: 01/12/2018        TEST DESCRIPTION   Technical Quality: This is a technically challenging study. This study was performed in conjunction with a 3ml intravenous injection of Optison contrast agent.     Aorta: The aortic root is normal in size, measuring 3.1 cm at sinotubular junction and 3.5 cm at Sinuses of Valsalva. The proximal ascending aorta is normal in size, measuring 3.1 cm across.     Left Atrium: The left atrial volume index is normal, measuring 22.73 cc/m2.     Left Ventricle: The left ventricle is normal in size, with an end-diastolic diameter of 4.6 cm, and an end-systolic diameter of 2.6 cm. LV wall thickness is normal, with the septum measuring 1.4 cm and the posterior wall measuring 1.2 cm across. Relative   wall thickness was increased at 0.52, and the LV mass index was 111.6 g/m2 consistent with concentric remodeling. There are no regional wall motion abnormalities. Left ventricular systolic function appears normal. Visually estimated ejection fraction is   60-65%. The LV Doppler derived stroke volume equals 65.0 ccs.      Diastolic indices: E wave velocity 0.9 m/s, E/A ratio 1.1,  msec., E/e' ratio(avg) 4. Diastolic function is normal.     Right Atrium: The right atrium is normal in size, measuring 6.0 cm in length in the apical view.     Right Ventricle: The right ventricle is normal in size. Global right ventricular systolic function appears normal. Tricuspid annular plane systolic excursion (TAPSE) is 3.3 cm.     Aortic Valve:  Aortic valve is normal in structure with normal leaflet mobility. The mean gradient obtained across the aortic valve is 5 mmHg.     Mitral Valve:  Mitral valve is normal in structure with normal leaflet mobility. The pressure half time is 38 msec. The calculated mitral valve area is 5.79 cm2.     Tricuspid Valve:  Tricuspid valve is normal in structure with normal leaflet mobility.     Pulmonary Valve:  Pulmonary valve is normal in structure with normal leaflet mobility.     Intracavitary: There is no evidence of pericardial effusion, intracavity mass, thrombi, or vegetation.         CONCLUSIONS     1 - Concentric remodeling.     2 - No wall motion abnormalities.     3 - Normal left ventricular systolic function (EF 60-65%).     4 - Normal left ventricular diastolic function.     5 - Normal right ventricular systolic function .             This document has been electronically    SIGNED BY: Jose Burgos MD On: 01/12/2018 15:57           CT OF THE CHEST WITH IV CONTRAST:     Technique:  After the intravenous administration of 75 cc of [id number] nonionic contrast, 5 mm axial images were acquired using helical CT technique from the lung apices through costophrenic sulci.    Comparison: 1/10/18     History:  Abnormal chest x-ray    Findings:  A  -Lungs: Subpleural reticular opacities are seen throughout the lower lobes which can be seen with UIP. Slightly decreased lung volumes are noted. No evidence of fibrosis or bronchiectasis. Mild thickening of the bronchovascular bundle within the perihilar  regions.  -Pleura: No thickening or fluid.  -Mediastinum/Angie:Shotty nodes which are non-pathologically enlarged   -Axilla: No adenopathy.  -Thyroid: 1.2 cm left thyroid nodule.  -Heart/Aorta: No pericardial effusion. Aorta normal caliber.  -Bones/Chest Wall: Mild degenerative changes at   -Upper Abdomen: Decreased attenuation liver is consistent with hepatic steatosis.   Impression          Subpleural reticular opacities are seen throughout the lower lobes which can be seen with UIP.     Hepatic steatosis.    All CT scans at this facility use dose modulation, iterative reconstruction and/or weight based dosing when appropriate to reduce radiation dose to as low as reasonably achievable.      Electronically signed by: LUCA STEPHEN MD  Date: 01/11/18  Time: 19:05        Significant Imaging: I have reviewed all pertinent imaging results/findings within the past 24 hours.    Assessment/Plan:      * Pneumonia of left lower lobe due to infectious organism    -CXR in ED consistent with LLL pneumonia  -Avelox initiated in ED  -afebril, no leukocytosis  -Duonebs  -supplemental O2 to keep O2 sat > 92%  -telemetry monitor  -frequent vital signs  -daily CBC, BMP  -Admit Obs    Possibly Interstitial Pneumonitis ( UIP )-- will increase steroids to 20 mg BID    1/11/18: .Patient with continue wheezing. Tachypnea with talking and frequent rest periods with talking. Patient reports slight improvement with breathing. Will continue IV abx and duonebs. Will add IV steroids and Hycoden cough syrup. Pulmonology consulted for assistance with medical management.               Lung infiltrate on CT      As above        Acute hypoxemic Resp Failure    Etiology unclear  Continue nebs, steroids, O2  Await further w/u          Influenza    Diagnoses at PCP office x 3 days ago  -resume Tamiflu x 1 more day              Diabetes mellitus, type 2    -accu check  -ISS  -continue to monitor                VTE Risk Mitigation         Ordered      enoxaparin injection 40 mg  Daily     Route:  Subcutaneous        01/11/18 1326     Medium Risk of VTE  Once      01/10/18 1715     Place sequential compression device  Until discontinued      01/10/18 1715              Deshawn Zabala MD  Department of [facility] Medicine   [facility]

## 2018-01-13 NOTE — ASSESSMENT & PLAN NOTE
-CXR in ED consistent with LLL pneumonia  -Avelox initiated in ED  -afebril, no leukocytosis  -Duonebs  -supplemental O2 to keep O2 sat > 92%  -telemetry monitor  -frequent vital signs  -daily CBC, BMP  -Admit Obs    No obvious pneumonia but may have basilar scarring  All pt's sx appear to be related to COPD exacerbation with acute bronchitis and severe physical deconditioning  De escalate IV abx, ambulate in the hallway, d/c home soon      Possibly Interstitial Pneumonitis ( UIP )-- will increase steroids to 20 mg BID    1/11/18: .Patient with continue wheezing. Tachypnea with talking and frequent rest periods with talking. Patient reports slight improvement with breathing. Will continue IV abx and duonebs. Will add IV steroids and Hycoden cough syrup. Pulmonology consulted for assistance with medical management.

## 2018-01-13 NOTE — ASSESSMENT & PLAN NOTE
Features on early UIP on CT  Other differential is Hypersensitivity  Send of DOMINIC, ANCA, Aspe ag: results ending      Need Out patient PFT, 6MWD

## 2018-01-13 NOTE — SUBJECTIVE & OBJECTIVE
Interval History: a little better but still has cough and SOB, w/u in progress.    Review of Systems   Constitutional: Positive for fatigue. Negative for unexpected weight change.   HENT: Negative.    Eyes: Negative.    Respiratory: Positive for cough and wheezing. Negative for apnea.    Cardiovascular: Negative.    Gastrointestinal: Negative.    Endocrine: Negative.    Genitourinary: Negative.    Musculoskeletal: Negative.    Skin: Negative.    Allergic/Immunologic: Negative.    Neurological: Negative.    Hematological: Negative.    Psychiatric/Behavioral: Negative.      Objective:     Vital Signs (Most Recent):  Temp: 97.9 °F (36.6 °C) (01/12/18 0744)  Pulse: 88 (01/12/18 1607)  Resp: 18 (01/12/18 1607)  BP: 127/60 (01/12/18 1607)  SpO2: (!) 94 % (01/12/18 1607) Vital Signs (24h Range):  Temp:  [97.4 °F (36.3 °C)-98.3 °F (36.8 °C)] 97.9 °F (36.6 °C)  Pulse:  [74-91] 88  Resp:  [18-20] 18  SpO2:  [91 %-96 %] 94 %  BP: (127-163)/(60-89) 127/60     Weight: 132.9 kg (292 lb 15.9 oz)  Body mass index is 42.04 kg/m².  No intake or output data in the 24 hours ending 01/12/18 1822   Physical Exam   Constitutional: He is oriented to person, place, and time. Vital signs are normal. He appears well-developed and well-nourished.   Morbidly obese elderly man in mild resp discomfort, walking in the room   HENT:   Head: Normocephalic and atraumatic.   Nose: Nose normal.   Mouth/Throat: Oropharynx is clear and moist.   Eyes: Conjunctivae and EOM are normal. Pupils are equal, round, and reactive to light.   Neck: Normal range of motion. Neck supple.   Cardiovascular: Normal rate, regular rhythm, normal heart sounds and intact distal pulses.  Exam reveals no gallop and no friction rub.    No murmur heard.  Pulmonary/Chest: He is in respiratory distress. He has decreased breath sounds. He has wheezes. He has no rhonchi. He has no rales.   Abdominal: Soft. Bowel sounds are normal. He exhibits no distension. There is no tenderness.    Genitourinary:   Genitourinary Comments: deferred   Musculoskeletal: He exhibits edema.   Neurological: He is alert and oriented to person, place, and time.   Skin: Skin is warm and dry. Capillary refill takes 2 to 3 seconds.   Psychiatric: He has a normal mood and affect. His behavior is normal. Judgment and thought content normal.   Nursing note and vitals reviewed.      Significant Labs:   ABGs:   Blood Culture:   BMP:   Recent Labs  Lab 01/11/18  0443   *      K 3.3*      CO2 21*   BUN 11   CREATININE 0.9   CALCIUM 8.0*     CBC:   Recent Labs  Lab 01/11/18  0443   WBC 4.95   HGB 12.9*   HCT 39.9*   *     Respiratory Culture:   Recent Labs  Lab 01/11/18  1748   GSRESP <10 epithelial cells per low power field.  Few WBC's  No organisms seen     All pertinent labs within the past 24 hours have been reviewed.  2D echo with color flow doppler   Order: [id number]   Status:  Final result   Visible to patient:  No (Not Released) Next appt:  01/18/2018 at 08:00 AM in Pulmonology (Elizabeth Lejeune, NP) Dx:  SOB (shortness of breath)   Details     Narrative     Date of Procedure: 01/12/2018        TEST DESCRIPTION   Technical Quality: This is a technically challenging study. This study was performed in conjunction with a 3ml intravenous injection of Optison contrast agent.     Aorta: The aortic root is normal in size, measuring 3.1 cm at sinotubular junction and 3.5 cm at Sinuses of Valsalva. The proximal ascending aorta is normal in size, measuring 3.1 cm across.     Left Atrium: The left atrial volume index is normal, measuring 22.73 cc/m2.     Left Ventricle: The left ventricle is normal in size, with an end-diastolic diameter of 4.6 cm, and an end-systolic diameter of 2.6 cm. LV wall thickness is normal, with the septum measuring 1.4 cm and the posterior wall measuring 1.2 cm across. Relative   wall thickness was increased at 0.52, and the LV mass index was 111.6 g/m2 consistent with  concentric remodeling. There are no regional wall motion abnormalities. Left ventricular systolic function appears normal. Visually estimated ejection fraction is   60-65%. The LV Doppler derived stroke volume equals 65.0 ccs.     Diastolic indices: E wave velocity 0.9 m/s, E/A ratio 1.1,  msec., E/e' ratio(avg) 4. Diastolic function is normal.     Right Atrium: The right atrium is normal in size, measuring 6.0 cm in length in the apical view.     Right Ventricle: The right ventricle is normal in size. Global right ventricular systolic function appears normal. Tricuspid annular plane systolic excursion (TAPSE) is 3.3 cm.     Aortic Valve:  Aortic valve is normal in structure with normal leaflet mobility. The mean gradient obtained across the aortic valve is 5 mmHg.     Mitral Valve:  Mitral valve is normal in structure with normal leaflet mobility. The pressure half time is 38 msec. The calculated mitral valve area is 5.79 cm2.     Tricuspid Valve:  Tricuspid valve is normal in structure with normal leaflet mobility.     Pulmonary Valve:  Pulmonary valve is normal in structure with normal leaflet mobility.     Intracavitary: There is no evidence of pericardial effusion, intracavity mass, thrombi, or vegetation.         CONCLUSIONS     1 - Concentric remodeling.     2 - No wall motion abnormalities.     3 - Normal left ventricular systolic function (EF 60-65%).     4 - Normal left ventricular diastolic function.     5 - Normal right ventricular systolic function .             This document has been electronically    SIGNED BY: Jose Burgos MD On: 01/12/2018 15:57           CT OF THE CHEST WITH IV CONTRAST:     Technique:  After the intravenous administration of 75 cc of [id number] nonionic contrast, 5 mm axial images were acquired using helical CT technique from the lung apices through costophrenic sulci.    Comparison: 1/10/18     History:  Abnormal chest x-ray    Findings:  A  -Lungs: Subpleural reticular  opacities are seen throughout the lower lobes which can be seen with UIP. Slightly decreased lung volumes are noted. No evidence of fibrosis or bronchiectasis. Mild thickening of the bronchovascular bundle within the perihilar regions.  -Pleura: No thickening or fluid.  -Mediastinum/Angie:Shotty nodes which are non-pathologically enlarged   -Axilla: No adenopathy.  -Thyroid: 1.2 cm left thyroid nodule.  -Heart/Aorta: No pericardial effusion. Aorta normal caliber.  -Bones/Chest Wall: Mild degenerative changes at   -Upper Abdomen: Decreased attenuation liver is consistent with hepatic steatosis.   Impression          Subpleural reticular opacities are seen throughout the lower lobes which can be seen with UIP.     Hepatic steatosis.    All CT scans at this facility use dose modulation, iterative reconstruction and/or weight based dosing when appropriate to reduce radiation dose to as low as reasonably achievable.      Electronically signed by: LUCA STEPHEN MD  Date: 01/11/18  Time: 19:05        Significant Imaging: I have reviewed all pertinent imaging results/findings within the past 24 hours.

## 2018-01-13 NOTE — SUBJECTIVE & OBJECTIVE
Interval History:      Review of Systems   Constitutional: Positive for fatigue. Negative for unexpected weight change.   HENT: Negative.    Eyes: Negative.    Respiratory: Positive for cough and wheezing. Negative for apnea.    Cardiovascular: Negative.    Gastrointestinal: Negative.    Endocrine: Negative.    Genitourinary: Negative.    Musculoskeletal: Negative.    Skin: Negative.    Allergic/Immunologic: Negative.    Neurological: Negative.    Hematological: Negative.    Psychiatric/Behavioral: Negative.      Objective:     Vital Signs (Most Recent):  Temp: 97.2 °F (36.2 °C) (01/13/18 1200)  Pulse: 79 (01/13/18 1200)  Resp: 20 (01/13/18 1200)  BP: (!) 143/69 (01/13/18 1200)  SpO2: (!) 94 % (01/13/18 1200) Vital Signs (24h Range):  Temp:  [97.2 °F (36.2 °C)-98.4 °F (36.9 °C)] 97.2 °F (36.2 °C)  Pulse:  [72-92] 79  Resp:  [18-20] 20  SpO2:  [91 %-97 %] 94 %  BP: (116-143)/(58-76) 143/69     Weight: 132.9 kg (292 lb 15.9 oz)  Body mass index is 42.04 kg/m².  No intake or output data in the 24 hours ending 01/13/18 1519   Physical Exam   Constitutional: He is oriented to person, place, and time. Vital signs are normal. He appears well-developed and well-nourished.   HENT:   Head: Normocephalic and atraumatic.   Nose: Nose normal.   Mouth/Throat: Oropharynx is clear and moist.   Eyes: EOM are normal. Pupils are equal, round, and reactive to light.   Neck: Normal range of motion. Neck supple.   Cardiovascular: Normal rate, regular rhythm and normal heart sounds.    No murmur heard.  Pulmonary/Chest: Effort normal. No respiratory distress. He has decreased breath sounds. He has no wheezes. He has no rhonchi. He has no rales.   Abdominal: Soft. Bowel sounds are normal.   Musculoskeletal: He exhibits no edema.   Neurological: He is alert and oriented to person, place, and time.   Skin: Skin is warm and dry. Capillary refill takes 2 to 3 seconds.   Nursing note and vitals reviewed.      Significant Labs: BMP:   CBC:   All  pertinent labs within the past 24 hours have been reviewed.  EXAM: CTA CHEST NON CORONARY    CLINICAL HISTORY: sob, elevated d-dimer    TECHNIQUE: CT angiogram performed of the chest following IV contrast administration to evaluate for pulmonary emboli. Multiplanar MIP reformations performed.    COMPARISON STUDIES: CT chest January 11, 2018    FINDINGS:  No evidence of pulmonary embolus.  Normal enhancement of the aorta with mild atherosclerosis.  Negative for aneurysm or dissection.    No pericardial effusions.  Small amount coronary artery calcifications.  Scattered small subcentimeter mediastinal lymph nodes without significant enlargement.    No pleural effusions.  No consolidation.  Again noted are a few scattered areas of subsegmental atelectasis and/or scarring.  Some dependent atelectasis greater on the left.  Some scattered peripheral areas of groundglass density are noted with minimal interstitial thickening similar to that seen previously which could be acute or chronic lung changes.  No significant change from the only recent comparison examination.    Marked fatty infiltration of the liver.   Impression       Negative for pulmonary embolus.    Other findings are stable.          All CT scans at this facility use dose modulation, iterative reconstruction, and/or weight based dosing when appropriate to reduce radiation dose to as low as reasonably achievable.      Electronically signed by: KARIN SANTIZO MD  Date: 01/13/18  Time: 10:24        Significant Imaging: I have reviewed all pertinent imaging results/findings within the past 24 hours.

## 2018-01-13 NOTE — PROGRESS NOTES
[facility] Hospital Medicine  Progress Note    Patient Name: Trip Prasad  MRN: [id number]  Patient Class: IP- Inpatient   Admission Date: 1/10/2018  Length of Stay: 2 days  Attending Physician: Deshawn Zabala MD  Primary Care Provider: Caleb Leong MD        Subjective:     Principal Problem:Pneumonia of left lower lobe due to infectious organism    HPI:  Trip Prasad is a 72 y.o. male patient  who was diagnosed and tx for bronchitis x 3 weeks ago (prescribed inhaler and nebulizer for wheezing) with recent diagnosis of influenza per PCP x 3 days ago ( started on Tamiflu) who presented to the Emergency Department with complaints of worsening SOB which onset gradually 3 weeks ago. Symptoms are constant and moderate in severity. No mitigating or exacerbating factors reported. Associated sxs include fatigue, cough, and congestion. Patient denies any fever, chills, diaphoresis, night sweats, headache, chest pain, palpitations, leg swelling, sore throat, rhinorrhea. Initial workup revealed abg with hypoxia. CXR with left lung base pneumonia. No leukocytosis, afebrile. Hospital Medicine consulted for admission.    Hospital Course:  Patient admitted for further treatment of pneumonia and influenza. Patient with continue wheezing. Tachypnea with talking and frequent rest periods with talking. Patient reports slight improvement with breathing. Will continue IV abx and duonebs. Will add IV steroids and Hycoden cough syrup. Pulmonology consulted for assistance with medical management.     1/12/18- feels a little better, cough, SOB a little better, still has mostly dry cough and coughing spells. CT neck pending, Chest CT shows some UIP picture. Dr. Garcia following.    1/13- pt looks and feels lot better, standing up without O2 to start walking in the hallway. Cough, SOB much better but still gets coughing spells doing the IS. Had CTA Chest done which came back neg for PE or any Pneumonia or CHF.       Interval History:      Review of Systems   Constitutional: Positive for fatigue. Negative for unexpected weight change.   HENT: Negative.    Eyes: Negative.    Respiratory: Positive for cough and wheezing. Negative for apnea.    Cardiovascular: Negative.    Gastrointestinal: Negative.    Endocrine: Negative.    Genitourinary: Negative.    Musculoskeletal: Negative.    Skin: Negative.    Allergic/Immunologic: Negative.    Neurological: Negative.    Hematological: Negative.    Psychiatric/Behavioral: Negative.      Objective:     Vital Signs (Most Recent):  Temp: 97.2 °F (36.2 °C) (01/13/18 1200)  Pulse: 79 (01/13/18 1200)  Resp: 20 (01/13/18 1200)  BP: (!) 143/69 (01/13/18 1200)  SpO2: (!) 94 % (01/13/18 1200) Vital Signs (24h Range):  Temp:  [97.2 °F (36.2 °C)-98.4 °F (36.9 °C)] 97.2 °F (36.2 °C)  Pulse:  [72-92] 79  Resp:  [18-20] 20  SpO2:  [91 %-97 %] 94 %  BP: (116-143)/(58-76) 143/69     Weight: 132.9 kg (292 lb 15.9 oz)  Body mass index is 42.04 kg/m².  No intake or output data in the 24 hours ending 01/13/18 1519   Physical Exam   Constitutional: He is oriented to person, place, and time. Vital signs are normal. He appears well-developed and well-nourished.   HENT:   Head: Normocephalic and atraumatic.   Nose: Nose normal.   Mouth/Throat: Oropharynx is clear and moist.   Eyes: EOM are normal. Pupils are equal, round, and reactive to light.   Neck: Normal range of motion. Neck supple.   Cardiovascular: Normal rate, regular rhythm and normal heart sounds.    No murmur heard.  Pulmonary/Chest: Effort normal. No respiratory distress. He has decreased breath sounds. He has no wheezes. He has no rhonchi. He has no rales.   Abdominal: Soft. Bowel sounds are normal.   Musculoskeletal: He exhibits no edema.   Neurological: He is alert and oriented to person, place, and time.   Skin: Skin is warm and dry. Capillary refill takes 2 to 3 seconds.   Nursing note and vitals reviewed.      Significant Labs: BMP:   CBC:    All pertinent labs within the past 24 hours have been reviewed.  EXAM: CTA CHEST NON CORONARY    CLINICAL HISTORY: sob, elevated d-dimer    TECHNIQUE: CT angiogram performed of the chest following IV contrast administration to evaluate for pulmonary emboli. Multiplanar MIP reformations performed.    COMPARISON STUDIES: CT chest January 11, 2018    FINDINGS:  No evidence of pulmonary embolus.  Normal enhancement of the aorta with mild atherosclerosis.  Negative for aneurysm or dissection.    No pericardial effusions.  Small amount coronary artery calcifications.  Scattered small subcentimeter mediastinal lymph nodes without significant enlargement.    No pleural effusions.  No consolidation.  Again noted are a few scattered areas of subsegmental atelectasis and/or scarring.  Some dependent atelectasis greater on the left.  Some scattered peripheral areas of groundglass density are noted with minimal interstitial thickening similar to that seen previously which could be acute or chronic lung changes.  No significant change from the only recent comparison examination.    Marked fatty infiltration of the liver.   Impression       Negative for pulmonary embolus.    Other findings are stable.          All CT scans at this facility use dose modulation, iterative reconstruction, and/or weight based dosing when appropriate to reduce radiation dose to as low as reasonably achievable.      Electronically signed by: KARIN SANTIZO MD  Date: 01/13/18  Time: 10:24        Significant Imaging: I have reviewed all pertinent imaging results/findings within the past 24 hours.    Assessment/Plan:      * Pneumonia of left lower lobe due to infectious organism    -CXR in ED consistent with LLL pneumonia  -Avelox initiated in ED  -afebril, no leukocytosis  -Duonebs  -supplemental O2 to keep O2 sat > 92%  -telemetry monitor  -frequent vital signs  -daily CBC, BMP  -Admit Obs    No obvious pneumonia but may have basilar scarring  All pt's sx  appear to be related to COPD exacerbation with acute bronchitis and severe physical deconditioning  De escalate IV abx, ambulate in the hallway, d/c home soon      Possibly Interstitial Pneumonitis ( UIP )-- will increase steroids to 20 mg BID    1/11/18: .Patient with continue wheezing. Tachypnea with talking and frequent rest periods with talking. Patient reports slight improvement with breathing. Will continue IV abx and duonebs. Will add IV steroids and Hycoden cough syrup. Pulmonology consulted for assistance with medical management.               Lung infiltrate on CT      As above        Acute hypoxemic Resp Failure    Etiology unclear  Continue nebs, steroids, O2  Await further w/u    As above-- improving  Will do home O2 eval          Influenza    Diagnoses at PCP office x 3 days ago  -resume Tamiflu x 1 more day              Positive D dimer    No PE          Diabetes mellitus, type 2    -accu check  -ISS  -continue to monitor    Mildly uncontrolled-- pt counseled             VTE Risk Mitigation         Ordered     enoxaparin injection 40 mg  Daily     Route:  Subcutaneous        01/11/18 1326     Medium Risk of VTE  Once      01/10/18 1715     Place sequential compression device  Until discontinued      01/10/18 1715              Deshawn Zabala MD  Department of Hospital Medicine   [facility]

## 2018-01-13 NOTE — PT/OT/SLP EVAL
Occupational Therapy   Evaluation    Name: Trip Prasad  MRN: [id number]  Admitting Diagnosis:  Pneumonia of left lower lobe due to infectious organism      Recommendations:     Discharge Recommendations: home  Discharge Equipment Recommendations:  none  Barriers to discharge:  None    History:     Occupational Profile:  Living Environment: Lives at home with wife   Previous level of function: Independent  Roles and Routines:   Equipment Owned:  none  Assistance upon Discharge: none    Past Medical History:   Diagnosis Date    Diabetes mellitus, type 2 1/2014    Glaucoma 2012    Hiatal hernia     Hyperlipidemia     Loss of hearing     Hearing aids 6/12 bilateral    Obesity (BMI 30-39.9)        Past Surgical History:   Procedure Laterality Date    EYE SURGERY Bilateral 2013    Cataract    GLAUCOMA SURGERY  10/12    ROTATOR CUFF REPAIR Right 3/04    TONSILLECTOMY      TOTAL KNEE ARTHROPLASTY Left     Total right knee replacement Right 11/19/15    WISDOM TOOTH EXTRACTION         Subjective     Chief Complaint: weakness  Patient/Family stated goals: Pt to return to PLOF  Communicated with: NURSE prior to session.  Pain/Comfort:  · Pain Rating 1: 0/10    Objective:     Patient found with: peripheral IV    General Precautions: Standard, fall   Orthopedic Precautions:N/A   Braces: N/A     Occupational Performance:    Bed Mobility:    · Patient completed Rolling/Turning to Right with independence  · Patient completed Supine to Sit with independence  · Patient completed Sit to Supine with independence    Functional Mobility/Transfers:  · Patient completed Sit <> Stand Transfer with independence  with  no assistive device   · Patient completed  Shower Transfer Stand Pivot technique with modified independence and per patient and son report with no AD    Activities of Daily Living:  Pt and son report that patient was able to shower independently without difficulty yesterday and today while son sat in  "room.      Physical Exam:  Balance: -       DYNAMIC STANDING - GOOD  Upper Extremity Range of Motion:     -       Right Upper Extremity: WFL  -       Left Upper Extremity: WFL  Upper Extremity Strength:    -       Right Upper Extremity: WFL  -       Left Upper Extremity: WFL    Patient left supine with all lines intact, call button in reach and SON present    [facility] 6 Click:  [facility] Total Score: 24    Treatment & Education:  Pt educated on safety precautions. Tolerated treatment well without complaints or difficulty  Education:    Assessment:     Trip Prasad is a 72 y.o. male with a medical diagnosis of Pneumonia of left lower lobe due to infectious organism.  He presents with good functional mobility and quality of life.  Performance deficits affecting function are weakness, decreased safety awareness.      Rehab Prognosis:  Really Good; patient would benefit from [employer] to address these deficits and reach maximum level of function.         Clinical Decision Makin.  OT Low:  "Pt evaluation falls under low complexity for evaluation coding due to performance deficits noted in 1-3 areas as stated above and 0 co-morbities affecting current functional status. Data obtained from problem focused assessments. No modifications or assistance was required for completion of evaluation. Only brief occupational profile and history review completed."     Plan:     Patient to be seen 3 x/week to address the above listed problems via  ([employer])  · Plan of Care Expires: 18  · Plan of Care Reviewed with: patient, son    This Plan of care has been discussed with the patient who was involved in its development and understands and is in agreement with the identified goals and treatment plan    GOALS:    Occupational Therapy Goals        Problem: Occupational Therapy Goal    Goal Priority Disciplines Outcome Interventions   Occupational Therapy Goal     OT, PT/OT     Description:  Pt to utilize [employer] " Program to maintain functional status during hospital stay                    Time Tracking:     OT Date of Treatment: 01/13/18  OT Start Time: 1015  OT Stop Time: 1030  OT Total Time (min): 15 min    Billable Minutes:Evaluation 15    Rashida Sweeney OT  1/13/2018

## 2018-01-13 NOTE — PLAN OF CARE
Problem: Patient Care Overview  Goal: Plan of Care Review  Outcome: Ongoing (interventions implemented as appropriate)  POC reviewed, verbalized understanding. Pt remained free from falls, fall precautions in place. Pt is NSR on monitor, 70-80s. VSS. Blood glucose monitored. Insulin given per sliding scale. No other c/o at this time. PIV intact, abx given per orders. Droplet precautions maintained. Call bell and personal belongings within reach. Hourly rounding complete. Reminded to call for assistance. Will continue to monitor.

## 2018-01-13 NOTE — PROGRESS NOTES
Went in to give scheduled breathing tx and was asked to come back at a later time.Patient is being cleaned up and then will take a bath.

## 2018-01-13 NOTE — ASSESSMENT & PLAN NOTE
-CXR in ED consistent with LLL pneumonia  -Avelox initiated in ED  -afebril, no leukocytosis  -Duonebs  -supplemental O2 to keep O2 sat > 92%  -telemetry monitor  -frequent vital signs  -daily CBC, BMP  -Admit Obs    Possibly Interstitial Pneumonitis ( UIP )-- will increase steroids to 20 mg BID    1/11/18: .Patient with continue wheezing. Tachypnea with talking and frequent rest periods with talking. Patient reports slight improvement with breathing. Will continue IV abx and duonebs. Will add IV steroids and Hycoden cough syrup. Pulmonology consulted for assistance with medical management.

## 2018-01-13 NOTE — PROGRESS NOTES
[facility]   Critical Care Medicine  Progress Note    Patient Name: Trip Prasad  MRN: [id number]  Admission Date: 1/10/2018  Hospital Length of Stay: 2 days  Code Status: Full Code  Attending Provider: Deshawn Zabala MD  Primary Care Provider: Caleb Leong MD   Principal Problem: Pneumonia of left lower lobe due to infectious organism    Subjective:     HPI:  72 year old male asked to see for cough fatigue and congestion  Recent influenza +ve on droplet isolation and TAMIFLU  Several provider treatments for wheezing /bronchitis over last 3 months.  Thinks was provoked from exposure to dry leaves [place] his property  Associated cough, No dysphagia or hemoptysis  Wheeze better if extends neck  Denies smoking or occupational toxin exposures.  No Hx of asthma or COPD  Works as .  Prior CXR report in [facility] recommended Chest CT 2014.                   Hospital/ICU Course:  Patient with continue wheezing. Tachypnea with talking and frequent rest periods with talking.   Patient reports slight improvement with breathing but c/o cough.  Continue IV abx and duonebs.   IV steroids and Hycoden cough syrup.   Pulmonology consulted for assistance with medical management.     01/12/2018: son at bedside, reviewed imaging, easily SOB. SpO2 was 94% on RA  CT neck and chest reviewed  1/13/2018: SOB somewhat better, still need O2, Dimer +ve.Echo normal    Interval History:   Marginal better but still SOB    Review of Systems   Constitutional: Positive for malaise/fatigue.   HENT: Negative.    Eyes: Negative.    Respiratory:        Sob   Gastrointestinal: Negative.    Genitourinary: Negative.    Musculoskeletal: Negative.    Skin: Negative.    Neurological: Negative.    Endo/Heme/Allergies: Negative.    Psychiatric/Behavioral: Negative.        Objective:     Vital Signs (Most Recent):  Temp: 98.4 °F (36.9 °C) (01/13/18 0753)  Pulse: 85 (01/13/18 0753)  Resp: 20 (01/13/18 0753)  BP: 137/71 (01/13/18  0753)  SpO2: 95 % (01/13/18 0753) Vital Signs (24h Range):  Temp:  [97.3 °F (36.3 °C)-98.4 °F (36.9 °C)] 98.4 °F (36.9 °C)  Pulse:  [72-92] 85  Resp:  [18-20] 20  SpO2:  [91 %-96 %] 95 %  BP: (116-147)/(58-76) 137/71     Weight: 132.9 kg (292 lb 15.9 oz)  Body mass index is 42.04 kg/m².    No intake or output data in the 24 hours ending 01/13/18 0934    Physical Exam   Constitutional: He is oriented to person, place, and time. Vital signs are normal. He appears well-developed and well-nourished.   HENT:   Head: Normocephalic and atraumatic.   Nose: Nose normal.   Mouth/Throat: Oropharynx is clear and moist.   Eyes: EOM are normal. Pupils are equal, round, and reactive to light.   Neck: Normal range of motion. Neck supple.   Cardiovascular: Normal rate, regular rhythm and normal heart sounds.    No murmur heard.  Pulmonary/Chest: Effort normal. No respiratory distress. He has decreased breath sounds. He has no wheezes. He has no rhonchi. He has no rales.   Abdominal: Soft. Bowel sounds are normal.   Musculoskeletal: He exhibits no edema.   Neurological: He is alert and oriented to person, place, and time.   Skin: Skin is warm and dry. Capillary refill takes 2 to 3 seconds.   Nursing note and vitals reviewed.      Vents:  Oxygen Concentration (%): 36 (01/13/18 0500)    Lines/Drains/Airways     Peripheral Intravenous Line                 Peripheral IV - Single Lumen 01/10/18 1550 Right Wrist 2 days                Significant Labs:    CBC/Anemia Profile:  No results for input(s): WBC, HGB, HCT, PLT, MCV, RDW, IRON, FERRITIN, RETIC, FOLATE, [id number], OCCULTBLOOD in the last 48 hours.     Chemistries:  No results for input(s): NA, K, CL, CO2, BUN, CREATININE, CALCIUM, ALBUMIN, PROT, BILITOT, ALKPHOS, ALT, AST, GLUCOSE, MG, PHOS in the last 48 hours.    All pertinent labs within the past 24 hours have been reviewed.    Significant Imaging:  I have reviewed all pertinent imaging results/findings within the past 24  hours.      Assessment/Plan:      I have reviewed all labs and imaging studies and compared to previous results. I have also discussed labs with all the teams in the medical care of the patient and my plan is outlined below '    Pulmonary   * Pneumonia of left lower lobe due to infectious organism    Continue Abx: Ceftriaxone and Doxycycline  Sputum culture  immunizations are current          Elevated diaphragm    Will need SNIFF study        Cough    According to son: SOB and Wheezing> 4 weeks        Lung infiltrate on CT    Features on early UIP on CT  Other differential is Hypersensitivity  Send of DOMINIC, ANCA, Aspe ag: results ending      Need Out patient PFT, 6MWD        Wheezing without diagnosis of asthma    Wheeze over vocal cords  Either VCD,   May need ENT eval    [facility] Hx doesn't support asthma or RAD    CT neck was unremarkable  Continue steroids ( changed to PO), Nebs        ID   Influenza    Off Droplet isolation  Tamiflu 5 days        Other   Positive D dimer    CTA chest        Acute hypoxemic Resp Failure    Echo was normal  x1 dose lasix yesterday          Discussed diagnosis, its evaluation, treatment and usual course. All questions answered.       Taqueria Garcia MD  Critical Care Medicine  [facility]

## 2018-01-13 NOTE — ASSESSMENT & PLAN NOTE
Etiology unclear  Continue nebs, steroids, O2  Await further w/u    As above-- improving  Will do home O2 eval

## 2018-01-14 VITALS
OXYGEN SATURATION: 93 % | WEIGHT: 297.63 LBS | HEIGHT: 70 IN | HEART RATE: 104 BPM | DIASTOLIC BLOOD PRESSURE: 65 MMHG | SYSTOLIC BLOOD PRESSURE: 125 MMHG | RESPIRATION RATE: 18 BRPM | TEMPERATURE: 98 F | BODY MASS INDEX: 42.61 KG/M2

## 2018-01-14 PROBLEM — R79.89 POSITIVE D DIMER: Status: RESOLVED | Noted: 2018-01-13 | Resolved: 2018-01-14

## 2018-01-14 PROBLEM — J18.9 PNEUMONIA OF LEFT LOWER LOBE DUE TO INFECTIOUS ORGANISM: Status: RESOLVED | Noted: 2018-01-10 | Resolved: 2018-01-14

## 2018-01-14 PROBLEM — I48.91 A-FIB: Status: ACTIVE | Noted: 2018-01-14

## 2018-01-14 PROBLEM — R91.8 LUNG INFILTRATE ON CT: Status: RESOLVED | Noted: 2018-01-12 | Resolved: 2018-01-14

## 2018-01-14 PROBLEM — I48.91 A-FIB: Status: RESOLVED | Noted: 2018-01-14 | Resolved: 2018-01-14

## 2018-01-14 PROBLEM — J11.1 INFLUENZA: Status: RESOLVED | Noted: 2018-01-10 | Resolved: 2018-01-14

## 2018-01-14 LAB
ANION GAP SERPL CALC-SCNC: 8 MMOL/L
BASOPHILS # BLD AUTO: 0.01 K/UL
BASOPHILS NFR BLD: 0.1 %
BUN SERPL-MCNC: 25 MG/DL
CALCIUM SERPL-MCNC: 8.9 MG/DL
CHLORIDE SERPL-SCNC: 106 MMOL/L
CO2 SERPL-SCNC: 27 MMOL/L
CREAT SERPL-MCNC: 1 MG/DL
DIFFERENTIAL METHOD: ABNORMAL
EOSINOPHIL # BLD AUTO: 0 K/UL
EOSINOPHIL NFR BLD: 0.1 %
ERYTHROCYTE [DISTWIDTH] IN BLOOD BY AUTOMATED COUNT: 14.7 %
EST. GFR  (AFRICAN AMERICAN): >60 ML/MIN/1.73 M^2
EST. GFR  (NON AFRICAN AMERICAN): >60 ML/MIN/1.73 M^2
GLUCOSE SERPL-MCNC: 202 MG/DL
HCT VFR BLD AUTO: 38.6 %
HGB BLD-MCNC: 12.3 G/DL
LYMPHOCYTES # BLD AUTO: 2.3 K/UL
LYMPHOCYTES NFR BLD: 21.6 %
MCH RBC QN AUTO: 28.1 PG
MCHC RBC AUTO-ENTMCNC: 31.9 G/DL
MCV RBC AUTO: 88 FL
MONOCYTES # BLD AUTO: 0.9 K/UL
MONOCYTES NFR BLD: 8.2 %
NEUTROPHILS # BLD AUTO: 7.5 K/UL
NEUTROPHILS NFR BLD: 70.7 %
PLATELET # BLD AUTO: 197 K/UL
PLATELET BLD QL SMEAR: ABNORMAL
PMV BLD AUTO: 9.4 FL
POCT GLUCOSE: 201 MG/DL (ref 70–110)
POTASSIUM SERPL-SCNC: 4.2 MMOL/L
RBC # BLD AUTO: 4.37 M/UL
SODIUM SERPL-SCNC: 141 MMOL/L
WBC # BLD AUTO: 10.68 K/UL

## 2018-01-14 PROCEDURE — 99222 1ST HOSP IP/OBS MODERATE 55: CPT | Mod: ,,, | Performed by: INTERNAL MEDICINE

## 2018-01-14 PROCEDURE — 99232 SBSQ HOSP IP/OBS MODERATE 35: CPT | Mod: ,,, | Performed by: INTERNAL MEDICINE

## 2018-01-14 PROCEDURE — 36415 COLL VENOUS BLD VENIPUNCTURE: CPT

## 2018-01-14 PROCEDURE — 25000003 PHARM REV CODE 250: Performed by: HOSPITALIST

## 2018-01-14 PROCEDURE — 63600175 PHARM REV CODE 636 W HCPCS: Performed by: INTERNAL MEDICINE

## 2018-01-14 PROCEDURE — 85025 COMPLETE CBC W/AUTO DIFF WBC: CPT

## 2018-01-14 PROCEDURE — 80048 BASIC METABOLIC PNL TOTAL CA: CPT

## 2018-01-14 PROCEDURE — 93010 ELECTROCARDIOGRAM REPORT: CPT | Mod: ,,, | Performed by: INTERNAL MEDICINE

## 2018-01-14 PROCEDURE — 25000003 PHARM REV CODE 250: Performed by: INTERNAL MEDICINE

## 2018-01-14 PROCEDURE — 25000003 PHARM REV CODE 250: Performed by: EMERGENCY MEDICINE

## 2018-01-14 PROCEDURE — 94761 N-INVAS EAR/PLS OXIMETRY MLT: CPT

## 2018-01-14 PROCEDURE — 25000003 PHARM REV CODE 250: Performed by: FAMILY MEDICINE

## 2018-01-14 PROCEDURE — 93005 ELECTROCARDIOGRAM TRACING: CPT

## 2018-01-14 RX ORDER — METOPROLOL TARTRATE 25 MG/1
25 TABLET, FILM COATED ORAL 2 TIMES DAILY
Qty: 60 TABLET | Refills: 1 | Status: SHIPPED | OUTPATIENT
Start: 2018-01-14 | End: 2018-05-22 | Stop reason: SDUPTHER

## 2018-01-14 RX ORDER — PREDNISONE 20 MG/1
10 TABLET ORAL DAILY
Qty: 20 TABLET | Refills: 0 | Status: SHIPPED | OUTPATIENT
Start: 2018-01-15 | End: 2018-01-25

## 2018-01-14 RX ORDER — MOXIFLOXACIN HYDROCHLORIDE 400 MG/1
400 TABLET ORAL DAILY
Qty: 7 TABLET | Refills: 0 | Status: SHIPPED | OUTPATIENT
Start: 2018-01-15 | End: 2018-02-22

## 2018-01-14 RX ORDER — DILTIAZEM HYDROCHLORIDE 120 MG/1
120 CAPSULE, COATED, EXTENDED RELEASE ORAL ONCE
Status: COMPLETED | OUTPATIENT
Start: 2018-01-14 | End: 2018-01-14

## 2018-01-14 RX ORDER — METOPROLOL TARTRATE 1 MG/ML
5 INJECTION, SOLUTION INTRAVENOUS ONCE
Status: COMPLETED | OUTPATIENT
Start: 2018-01-14 | End: 2018-01-14

## 2018-01-14 RX ORDER — PANTOPRAZOLE SODIUM 40 MG/1
40 TABLET, DELAYED RELEASE ORAL
Qty: 60 TABLET | Refills: 1 | Status: SHIPPED | OUTPATIENT
Start: 2018-01-14 | End: 2018-02-22

## 2018-01-14 RX ADMIN — MOXIFLOXACIN HYDROCHLORIDE 400 MG: 400 TABLET, FILM COATED ORAL at 10:01

## 2018-01-14 RX ADMIN — DILTIAZEM HYDROCHLORIDE 120 MG: 120 CAPSULE, COATED, EXTENDED RELEASE ORAL at 02:01

## 2018-01-14 RX ADMIN — PANTOPRAZOLE SODIUM 40 MG: 40 TABLET, DELAYED RELEASE ORAL at 10:01

## 2018-01-14 RX ADMIN — HYDROCODONE BITARTRATE AND HOMATROPINE METHYLBROMIDE 2 ML: 5; 1.5 SOLUTION ORAL at 02:01

## 2018-01-14 RX ADMIN — METOROPROLOL TARTRATE 5 MG: 5 INJECTION, SOLUTION INTRAVENOUS at 01:01

## 2018-01-14 RX ADMIN — HYDROCODONE BITARTRATE AND HOMATROPINE METHYLBROMIDE 2 ML: 5; 1.5 SOLUTION ORAL at 10:01

## 2018-01-14 RX ADMIN — INSULIN ASPART 4 UNITS: 100 INJECTION, SOLUTION INTRAVENOUS; SUBCUTANEOUS at 05:01

## 2018-01-14 RX ADMIN — PREDNISONE 20 MG: 20 TABLET ORAL at 10:01

## 2018-01-14 RX ADMIN — OSELTAMIVIR PHOSPHATE 75 MG: 75 CAPSULE ORAL at 10:01

## 2018-01-14 RX ADMIN — APIXABAN 5 MG: 2.5 TABLET, FILM COATED ORAL at 10:01

## 2018-01-14 RX ADMIN — HYDROCODONE BITARTRATE AND HOMATROPINE METHYLBROMIDE 2 ML: 5; 1.5 SOLUTION ORAL at 05:01

## 2018-01-14 NOTE — PROGRESS NOTES
Pt went into Afib 0041. HR 90-115s, jumping into 130s. /70. Pt resting comfortably in bed. MD Jarret notified. EKG ordered.    0110: EKG showed Afib, HR in 120s. Orders for 1x dose 5mg metoprolol.    0217: HR Afib 90-115s. MD Jarret notified. Cardizem PO x1 dose ordered.   Orders for Cardiology consult & echo in AM.

## 2018-01-14 NOTE — PT/OT/SLP EVAL
Physical Therapy Evaluation and Discharge Note    Patient Name:  Trip Prasad   MRN:  [id number]    Recommendations:     Discharge Recommendations:  home   Discharge Equipment Recommendations: none   Barriers to discharge: None    Assessment:     Trip Prasad is a 72 y.o. male admitted with a medical diagnosis of Pneumonia of left lower lobe due to infectious organism. .  At this time, patient is functioning at their prior level of function and does not require further acute PT services.     Recent Surgery: * No surgery found *      Plan:     During this hospitalization, patient does not require further acute PT services.  Please re-consult if situation changes.     Plan of Care Reviewed with: patient, son    Subjective     Communicated with Dudley prior to session.  Patient found supine in bed upon PT entry to room, agreeable to evaluation.      Chief Complaint: weakness and sob  Patient comments/goals: to get stronger, breathe better and go home  Pain/Comfort:  · Pain Rating 1: 0/10    Patients cultural, spiritual, Zoroastrianism conflicts given the current situation:      Living Environment:  With wife; supportive family  Prior to admission, patients level of function was indep.  Patient has the following equipment: none.  DME owned (not currently used): none.  Upon discharge, patient will have assistance from wife/family.    Objective:     Patient found with: oxygen, peripheral IV     General Precautions: Standard, respiratory   Orthopedic Precautions:N/A   Braces: N/A     Exams:  · B LE ROM WFL AND STRENGTH GROSSLY 4/5 AT LEAST    Functional Mobility:  · BED MOBILITY - SUP  · TRANSFERS - SUP  · AMB - SUP NO AD X 300FT NO LOB    AM-PAC 6 CLICK MOBILITY  Total Score:20       Therapeutic Activities and Exercises:   P.T. INSTRUCTED PATIENT AND CG ON POC FOR EVAL ONLY, SAFETY PRECAUTIONS, USE OF I.S. FOR BREATHING EXS AND EXS TO DEC STIFFNESS & IMPROVE MOBILITY    Patient left HOB elevated with all lines  intact, call button in reach, NURSE notified and SON present.    GOALS:    Physical Therapy Goals        Problem: Physical Therapy Goal    Goal Priority Disciplines Outcome Goal Variances Interventions   Physical Therapy Goal     PT/OT, PT      Description:  Patient will perform all mobility at sup or less - goal met at eval                    History:     Past Medical History:   Diagnosis Date    Diabetes mellitus, type 2 1/2014    Glaucoma 2012    Hiatal hernia     Hyperlipidemia     Loss of hearing     Hearing aids 6/12 bilateral    Obesity (BMI 30-39.9)        Past Surgical History:   Procedure Laterality Date    EYE SURGERY Bilateral 2013    Cataract    GLAUCOMA SURGERY  10/12    ROTATOR CUFF REPAIR Right 3/04    TONSILLECTOMY      TOTAL KNEE ARTHROPLASTY Left     Total right knee replacement Right 11/19/15    WISDOM TOOTH EXTRACTION         Clinical Decision Making:     History  Co-morbidities and personal factors that may impact the plan of care Examination  Body Structures and Functions, activity limitations and participation restrictions that may impact the plan of care Clinical Presentation   Decision Making/ Complexity Score   Co-morbidities:   [] Time since onset of injury / illness / exacerbation  [] Status of current condition  []Patient's cognitive status and safety concerns    [] Multiple Medical Problems (see med hx)  Personal Factors:   [] Patient's age  [] Prior Level of function   [] Patient's home situation (environment and family support)  [] Patient's level of motivation  [] Expected progression of patient      HISTORY:(criteria)    [] 14320 - no personal factors/history    [] 33945 - has 1-2 personal factor/comorbidity     [] 07120 - has >3 personal factor/comorbidity     Body Regions:  [] Objective examination findings  [] Head     []  Neck  [] Trunk   [] Upper Extremity  [] Lower Extremity    Body Systems:  [] For communication ability, affect, cognition, language, and learning  style: the assessment of the ability to make needs known, consciousness, orientation (person, place, and time), expected emotional /behavioral responses, and learning preferences (eg, learning barriers, education  needs)  [] For the neuromuscular system: a general assessment of gross coordinated movement (eg, balance, gait, locomotion, transfers, and transitions) and motor function  (motor control and motor learning)  [] For the musculoskeletal system: the assessment of gross symmetry, gross range of motion, gross strength, height, and weight  [] For the integumentary system: the assessment of pliability(texture), presence of scar formation, skin color, and skin integrity  [] For cardiovascular/pulmonary system: the assessment of heart rate, respiratory rate, blood pressure, and edema     Activity limitations:    [] Patient's cognitive status and saf ety concerns          [] Status of current condition      [] Weight bearing restriction  [] Cardiopulmunary Restriction    Participation Restrictions:   [] Goals and goal agreement with the patient     [] Rehab potential (prognosis) and probable outcome      Examination of Body System: (criteria)    [] 47888 - addressing 1-2 elements    [] 56908 - addressing a total of 3 or more elements     [] 53466 -  Addressing a total of 4 or more elements         Clinical Presentation: (criteria)  Choose one     On examination of body system using standardized tests and measures patient presents with (CHOOSE ONE) elements from any of the following: body structures and functions, activity limitations, and/or participation restrictions.  Leading to a clinical presentation that is considered (CHOOSE ONE)                              Clinical Decision Making  (Eval Complexity):  Choose One     Time Tracking:     PT Received On:    PT Start Time: 1315     PT Stop Time: 1330  PT Total Time (min): 15 min     Billable Minutes: Evaluation 15      Alvin Street PT  01/13/2018

## 2018-01-14 NOTE — PROGRESS NOTES
[facility] -   Critical Care Medicine  Progress Note    Patient Name: Trip Prasad  MRN: [id number]  Admission Date: 1/10/2018  Hospital Length of Stay: 3 days  Code Status: Full Code  Attending Provider: Deshawn Zabala MD  Primary Care Provider: Caleb Leong MD   Principal Problem: Pneumonia of left lower lobe due to infectious organism    Subjective:     HPI:  72 year old male asked to see for cough fatigue and congestion  Recent influenza +ve on droplet isolation and TAMIFLU  Several provider treatments for wheezing /bronchitis over last 3 months.  Thinks was provoked from exposure to dry leaves on his property  Associated cough, No dysphagia or hemoptysis  Wheeze better if extends neck  Denies smoking or occupational toxin exposures.  No Hx of asthma or COPD  Works as .  Prior CXR report in [facility] recommended Chest CT 2014.                   Hospital/ICU Course:  Patient with continue wheezing. Tachypnea with talking and frequent rest periods with talking.   Patient reports slight improvement with breathing but c/o cough.  Continue IV abx and duonebs.   IV steroids and Hycoden cough syrup.   Pulmonology consulted for assistance with medical management.     01/12/2018: son at bedside, reviewed imaging, easily SOB. SpO2 was 94% on RA  CT neck and chest reviewed  1/13/2018: SOB somewhat better, still need O2, Dimer +ve.Echo normal  1/14/2018: at baseline, A fib last night    Interval History:   No interval adverse events except A fib    Review of Systems   All other systems reviewed and are negative.      Objective:     Vital Signs (Most Recent):  Temp: 97.7 °F (36.5 °C) (01/14/18 1012)  Pulse: 86 (01/14/18 1012)  Resp: 20 (01/14/18 1012)  BP: 111/64 (01/14/18 1012)  SpO2: 96 % (01/14/18 1012) Vital Signs (24h Range):  Temp:  [97.2 °F (36.2 °C)-98.4 °F (36.9 °C)] 97.7 °F (36.5 °C)  Pulse:  [] 86  Resp:  [18-20] 20  SpO2:  [93 %-97 %] 96 %  BP: (111-163)/(63-94) 111/64      Weight: 135 kg (297 lb 9.9 oz)  Body mass index is 42.7 kg/m².      Intake/Output Summary (Last 24 hours) at 01/14/18 1148  Last data filed at 01/13/18 1500   Gross per 24 hour   Intake              560 ml   Output              300 ml   Net              260 ml       Physical Exam   Constitutional: He is oriented to person, place, and time. Vital signs are normal. He appears well-developed and well-nourished.   HENT:   Head: Normocephalic and atraumatic.   Nose: Nose normal.   Mouth/Throat: Oropharynx is clear and moist.   Eyes: EOM are normal. Pupils are equal, round, and reactive to light.   Neck: Normal range of motion. Neck supple.   Cardiovascular: Normal rate, regular rhythm and normal heart sounds.    No murmur heard.  Pulmonary/Chest: Effort normal. No respiratory distress. He has decreased breath sounds. He has no wheezes. He has no rhonchi. He has no rales.   Abdominal: Soft. Bowel sounds are normal.   Musculoskeletal: He exhibits no edema.   Neurological: He is alert and oriented to person, place, and time.   Skin: Skin is warm and dry. Capillary refill takes 2 to 3 seconds.   Nursing note and vitals reviewed.      Vents:  Oxygen Concentration (%): 36 (01/13/18 0500)    Lines/Drains/Airways     Peripheral Intravenous Line                 Peripheral IV - Single Lumen 01/10/18 1550 Right Wrist 3 days                Significant Labs:    CBC/Anemia Profile:    Recent Labs  Lab 01/14/18  0520   WBC 10.68   HGB 12.3*   HCT 38.6*      MCV 88   RDW 14.7*        Chemistries:    Recent Labs  Lab 01/14/18  0520      K 4.2      CO2 27   BUN 25*   CREATININE 1.0   CALCIUM 8.9       All pertinent labs within the past 24 hours have been reviewed.    Significant Imaging:  I have reviewed and interpreted all pertinent imaging results/findings within the past 24 hours.      Home Oxygen Evaluation     Date Performed: 1/14/2018     1)         Patient's Home O2 Sat on room air, while at rest: 91                                If O2 sats on room air at rest are 88% or below, patient qualifies. No additional testing needed. Document N/A in steps 2 and 3. If 89% or above, complete steps 2.        2)         Patient's O2 Sat on room air while exercisin                               If O2 sats on room air while exercising remain 89% or above patient does not qualify, no further testing needed Document N/A in step 3. If O2 sats on room air while exercising are 88% or below, continue to step 3.        3)         Patient's O2 Sat while exercising on O2:n/a at n/a LPM                                           (Must show improvement from #2 for patients to qualify)     If O2 sats improve on oxygen, patient qualifies for portable oxygen. If not, the patient does not qualify.       Assessment/Plan:     Pulmonary   Elevated diaphragm    Will need SNIFF study        Cough    According to son: SOB and Wheezing> 4 weeks  resolved        Wheezing without diagnosis of asthma    Wheeze over vocal cords  Either VCD,   May need ENT eval    [facility] Hx doesn't support asthma or RAD    CT neck was unremarkable  Continue steroids ( changed to PO), Nebs  Prednisone taper        Cardiac/Vascular   Atrial fibrillation    Per cardiology Aprixiban  Diltiazem PO        Endocrine   Diabetes mellitus, type 2    Sliding scale          DC on PO AVELOX  Follow in office       I have reviewed all labs and imaging studies and compared to previous results. I have also discussed labs with all the teams in the medical care of the patient and my plan is outlined below     Discussed diagnosis, its evaluation, treatment and usual course. All questions answered.         Taqueria Garcia MD  Critical Care Medicine  [facility] -

## 2018-01-14 NOTE — PLAN OF CARE
Problem: Pneumonia (Adult)  Intervention: Prevent/Manage Infection Progression  Pt ambulated in hallway 4 times today on RA. Report mildly short of breath for activity tolerance. Pt coughing relieved with scheduled q 4hr cough syrup.

## 2018-01-14 NOTE — HPI
"Consult Note  Cardiology    Consult Requested By:  Reason for Consult:     SUBJECTIVE:     History of Present Illness:  Patient is a 72 y.o. male presents with new onset afib and pneumonia.  pts sx are SOB. Pt denies CP. EKG with afib with controlled rate, no ischemic changes. Pt on diltiazem and metoprolol.  Pt with possible DM. CHADS score is 2. Echo is normal.    Review of patient's allergies indicates:   Allergen Reactions    Biaxin [clarithromycin] Other (See Comments)     Pt unsure     Pcn [penicillins]     Metformin Rash     Rash over body        Past Medical History:   Diagnosis Date    Diabetes mellitus, type 2 1/2014    Glaucoma 2012    Hiatal hernia     Hyperlipidemia     Loss of hearing     Hearing aids 6/12 bilateral    Obesity (BMI 30-39.9)      Past Surgical History:   Procedure Laterality Date    EYE SURGERY Bilateral 2013    Cataract    GLAUCOMA SURGERY  10/12    ROTATOR CUFF REPAIR Right 3/04    TONSILLECTOMY      TOTAL KNEE ARTHROPLASTY Left     Total right knee replacement Right 11/19/15    WISDOM TOOTH EXTRACTION       Family History   Problem Relation Age of Onset    Cancer Maternal Aunt     Heart disease Maternal Aunt     Heart disease Maternal Grandfather     Cancer Maternal Uncle      Social History   Substance Use Topics    Smoking status: Never Smoker    Smokeless tobacco: Never Used    Alcohol use 0.0 oz/week      Comment: " very little"        Review of Systems:  Constitutional: negative  Eyes: negative  Ears, nose, mouth, throat, and face: negative  Respiratory: negative  Cardiovascular: negative  Gastrointestinal: negative  Genitourinary:negative  Hematologic/lymphatic: negative  Musculoskeletal:negative,   Neurological: negative  Behavioral/Psych: negative  Endocrine: negative  Allergic/Immunologic: negative    OBJECTIVE:     Vital Signs (Most Recent)  Temp: 98.4 °F (36.9 °C) (01/14/18 0335)  Pulse: 95 (01/14/18 0335)  Resp: 18 (01/14/18 0335)  BP: 130/84 " (01/14/18 0335)  SpO2: 96 % (01/14/18 0335)    Vital Signs Range (Last 24H):  Temp:  [97.2 °F (36.2 °C)-98.4 °F (36.9 °C)]   Pulse:  []   Resp:  [18-20]   BP: (130-163)/(63-94)   SpO2:  [93 %-97 %]     Current Facility-Administered Medications   Medication    acetaminophen tablet 650 mg    apixaban tablet 5 mg    dextrose 50% injection 12.5 g    dextrose 50% injection 25 g    glucagon (human recombinant) injection 1 mg    glucose chewable tablet 16 g    glucose chewable tablet 24 g    hydrocodone-homatropine 5-1.5 mg/5mL oral syrup 2 mL    insulin aspart pen 1-10 Units    moxifloxacin tablet 400 mg    ondansetron injection 4 mg    oseltamivir capsule 75 mg    pantoprazole EC tablet 40 mg    predniSONE tablet 20 mg    promethazine (PHENERGAN) 6.25 mg in dextrose 5 % 50 mL IVPB     No current facility-administered medications on file prior to encounter.      Current Outpatient Prescriptions on File Prior to Encounter   Medication Sig    aspirin (ECOTRIN) 81 MG EC tablet Take 81 mg by mouth once daily.    blood sugar diagnostic Strp 1 strip by Misc.(Non-Drug; Combo Route) route once daily.    brinzolamide-brimonidine 1-0.2 % DrpS Apply to eye.    triamcinolone acetonide 0.1% (KENALOG) 0.1 % cream Apply topically 2 (two) times daily.       Physical Exam:  General appearance: alert, appears stated age and cooperative  Head: Normocephalic, without obvious abnormality, atraumatic  Eyes:  conjunctivae/corneas clear. PERRL, EOM's intact. Fundi benign.  Nose: no discharge  Throat: normal findings: tongue midline and normal  Neck: no adenopathy, no carotid bruit, no JVD, supple, symmetrical, trachea midline and thyroid not enlarged, symmetric, no tenderness/mass/nodules  Back:  no skin lesions, erythema, or scars  Lungs:  clear to auscultation bilaterally  Chest wall: no tenderness  Heart: regular rate and rhythm, S1, S2 normal, no murmur, click, rub or gallop  Abdomen: soft, non-tender; bowel sounds  normal; no masses,  no organomegaly  Extremities: extremities normal, atraumatic, no cyanosis or edema  Pulses: 2+ and symmetric  Skin: Skin color, texture, turgor normal. No rashes or lesions  Neurologic: Grossly normal    Laboratory:  Chemistry:   Lab Results   Component Value Date     01/14/2018    K 4.2 01/14/2018     01/14/2018    CO2 27 01/14/2018    BUN 25 (H) 01/14/2018    CREATININE 1.0 01/14/2018    CALCIUM 8.9 01/14/2018     Cardiac Markers:   Lab Results   Component Value Date    TROPONINI <0.006 01/10/2018     Cardiac Markers (Last 3):   Lab Results   Component Value Date    TROPONINI <0.006 01/10/2018     CBC:   Lab Results   Component Value Date    WBC 10.68 01/14/2018    HGB 12.3 (L) 01/14/2018    HCT 38.6 (L) 01/14/2018    MCV 88 01/14/2018     01/14/2018     Lipids:   Lab Results   Component Value Date    CHOL 195 03/02/2016    TRIG 115 03/02/2016    HDL 38 (L) 03/02/2016     Coagulation: No results found for: PT, INR, APTT    Diagnostic Results:  ECG: Reviewed  X-Ray: Reviewed  US: Reviewed  CT: Reviewed  Echo: Reviewed      ASSESSMENT/PLAN:     Patient Active Problem List   Diagnosis    Diabetes mellitus, type 2    Hyperlipidemia    Obesity (BMI 30-39.9)    Glaucoma    Hiatal hernia    Skin infection    Hyperglycemia    Cough    Rash    Acute hypoxemic Resp Failure    Upper respiratory tract infection    Pneumonia of left lower lobe due to infectious organism    Influenza    Wheezing without diagnosis of asthma    Elevated diaphragm    Lung infiltrate on CT    Positive D dimer        Plan: continue diltiazem and metoprolol             Start eliquis            Outpt stress test once recovers from pneumonia             Check TSH

## 2018-01-14 NOTE — CONSULTS
"History of Present Illness:  Patient is a 72 y.o. male presents with new onset afib and pneumonia.  pts sx are SOB. Pt denies CP. EKG with afib with controlled rate, no ischemic changes. Pt on diltiazem and metoprolol.  Pt with possible DM. CHADS score is 2. Echo is normal.    Review of patient's allergies indicates:   Allergen Reactions    Biaxin [clarithromycin] Other (See Comments)     Pt unsure     Pcn [penicillins]     Metformin Rash     Rash over body        Past Medical History:   Diagnosis Date    Diabetes mellitus, type 2 1/2014    Glaucoma 2012    Hiatal hernia     Hyperlipidemia     Loss of hearing     Hearing aids 6/12 bilateral    Obesity (BMI 30-39.9)      Past Surgical History:   Procedure Laterality Date    EYE SURGERY Bilateral 2013    Cataract    GLAUCOMA SURGERY  10/12    ROTATOR CUFF REPAIR Right 3/04    TONSILLECTOMY      TOTAL KNEE ARTHROPLASTY Left     Total right knee replacement Right 11/19/15    WISDOM TOOTH EXTRACTION       Family History   Problem Relation Age of Onset    Cancer Maternal Aunt     Heart disease Maternal Aunt     Heart disease Maternal Grandfather     Cancer Maternal Uncle      Social History   Substance Use Topics    Smoking status: Never Smoker    Smokeless tobacco: Never Used    Alcohol use 0.0 oz/week      Comment: " very little"        Review of Systems:  Constitutional: negative  Eyes: negative  Ears, nose, mouth, throat, and face: negative  Respiratory: negative  Cardiovascular: negative  Gastrointestinal: negative  Genitourinary:negative  Hematologic/lymphatic: negative  Musculoskeletal:negative,   Neurological: negative  Behavioral/Psych: negative  Endocrine: negative  Allergic/Immunologic: negative    OBJECTIVE:     Vital Signs (Most Recent)  Temp: 98.4 °F (36.9 °C) (01/14/18 0335)  Pulse: 95 (01/14/18 0335)  Resp: 18 (01/14/18 0335)  BP: 130/84 (01/14/18 0335)  SpO2: 96 % (01/14/18 0335)    Vital Signs Range (Last 24H):  Temp:  [97.2 °F " (36.2 °C)-98.4 °F (36.9 °C)]   Pulse:  []   Resp:  [18-20]   BP: (130-163)/(63-94)   SpO2:  [93 %-97 %]     Current Facility-Administered Medications   Medication    acetaminophen tablet 650 mg    apixaban tablet 5 mg    dextrose 50% injection 12.5 g    dextrose 50% injection 25 g    glucagon (human recombinant) injection 1 mg    glucose chewable tablet 16 g    glucose chewable tablet 24 g    hydrocodone-homatropine 5-1.5 mg/5mL oral syrup 2 mL    insulin aspart pen 1-10 Units    moxifloxacin tablet 400 mg    ondansetron injection 4 mg    oseltamivir capsule 75 mg    pantoprazole EC tablet 40 mg    predniSONE tablet 20 mg    promethazine (PHENERGAN) 6.25 mg in dextrose 5 % 50 mL IVPB     No current facility-administered medications on file prior to encounter.      Current Outpatient Prescriptions on File Prior to Encounter   Medication Sig    aspirin (ECOTRIN) 81 MG EC tablet Take 81 mg by mouth once daily.    blood sugar diagnostic Strp 1 strip by Misc.(Non-Drug; Combo Route) route once daily.    brinzolamide-brimonidine 1-0.2 % DrpS Apply to eye.    triamcinolone acetonide 0.1% (KENALOG) 0.1 % cream Apply topically 2 (two) times daily.       Physical Exam:  General appearance: alert, appears stated age and cooperative  Head: Normocephalic, without obvious abnormality, atraumatic  Eyes:  conjunctivae/corneas clear. PERRL, EOM's intact. Fundi benign.  Nose: no discharge  Throat: normal findings: tongue midline and normal  Neck: no adenopathy, no carotid bruit, no JVD, supple, symmetrical, trachea midline and thyroid not enlarged, symmetric, no tenderness/mass/nodules  Back:  no skin lesions, erythema, or scars  Lungs:  clear to auscultation bilaterally  Chest wall: no tenderness  Heart: regular rate and rhythm, S1, S2 normal, no murmur, click, rub or gallop  Abdomen: soft, non-tender; bowel sounds normal; no masses,  no organomegaly  Extremities: extremities normal, atraumatic, no cyanosis  or edema  Pulses: 2+ and symmetric  Skin: Skin color, texture, turgor normal. No rashes or lesions  Neurologic: Grossly normal    Laboratory:  Chemistry:   Lab Results   Component Value Date     01/14/2018    K 4.2 01/14/2018     01/14/2018    CO2 27 01/14/2018    BUN 25 (H) 01/14/2018    CREATININE 1.0 01/14/2018    CALCIUM 8.9 01/14/2018     Cardiac Markers:   Lab Results   Component Value Date    TROPONINI <0.006 01/10/2018     Cardiac Markers (Last 3):   Lab Results   Component Value Date    TROPONINI <0.006 01/10/2018     CBC:   Lab Results   Component Value Date    WBC 10.68 01/14/2018    HGB 12.3 (L) 01/14/2018    HCT 38.6 (L) 01/14/2018    MCV 88 01/14/2018     01/14/2018     Lipids:   Lab Results   Component Value Date    CHOL 195 03/02/2016    TRIG 115 03/02/2016    HDL 38 (L) 03/02/2016     Coagulation: No results found for: PT, INR, APTT    Diagnostic Results:  ECG: Reviewed  X-Ray: Reviewed  US: Reviewed  CT: Reviewed  Echo: Reviewed      ASSESSMENT/PLAN:     Patient Active Problem List   Diagnosis    Diabetes mellitus, type 2    Hyperlipidemia    Obesity (BMI 30-39.9)    Glaucoma    Hiatal hernia    Skin infection    Hyperglycemia    Cough    Rash    Acute hypoxemic Resp Failure    Upper respiratory tract infection    Pneumonia of left lower lobe due to infectious organism    Influenza    Wheezing without diagnosis of asthma    Elevated diaphragm    Lung infiltrate on CT    Positive D dimer        Plan: continue diltiazem and metoprolol             Start eliquis            Outpt stress test once recovers from pneumonia             Check TSH

## 2018-01-14 NOTE — PROGRESS NOTES
Home Oxygen Evaluation    Date Performed: 1/14/2018    1) Patient's Home O2 Sat on room air, while at rest: ***        If O2 sats on room air at rest are 88% or below, patient qualifies. No additional testing needed. Document N/A in steps 2 and 3. If 89% or above, complete steps 2.      2) Patient's O2 Sat on room air while exercising: ***        If O2 sats on room air while exercising remain 89% or above patient does not qualify, no further testing needed Document N/A in step 3. If O2 sats on room air while exercising are 88% or below, continue to step 3.      3) Patient's O2 Sat while exercising on O2: *** at *** LPM         (Must show improvement from #2 for patients to qualify)    If O2 sats improve on oxygen, patient qualifies for portable oxygen. If not, the patient does not qualify.

## 2018-01-14 NOTE — SUBJECTIVE & OBJECTIVE
Interval History:   No interval adverse events except A fib    Review of Systems   All other systems reviewed and are negative.      Objective:     Vital Signs (Most Recent):  Temp: 97.7 °F (36.5 °C) (01/14/18 1012)  Pulse: 86 (01/14/18 1012)  Resp: 20 (01/14/18 1012)  BP: 111/64 (01/14/18 1012)  SpO2: 96 % (01/14/18 1012) Vital Signs (24h Range):  Temp:  [97.2 °F (36.2 °C)-98.4 °F (36.9 °C)] 97.7 °F (36.5 °C)  Pulse:  [] 86  Resp:  [18-20] 20  SpO2:  [93 %-97 %] 96 %  BP: (111-163)/(63-94) 111/64     Weight: 135 kg (297 lb 9.9 oz)  Body mass index is 42.7 kg/m².      Intake/Output Summary (Last 24 hours) at 01/14/18 1148  Last data filed at 01/13/18 1500   Gross per 24 hour   Intake              560 ml   Output              300 ml   Net              260 ml       Physical Exam   Constitutional: He is oriented to person, place, and time. Vital signs are normal. He appears well-developed and well-nourished.   HENT:   Head: Normocephalic and atraumatic.   Nose: Nose normal.   Mouth/Throat: Oropharynx is clear and moist.   Eyes: EOM are normal. Pupils are equal, round, and reactive to light.   Neck: Normal range of motion. Neck supple.   Cardiovascular: Normal rate, regular rhythm and normal heart sounds.    No murmur heard.  Pulmonary/Chest: Effort normal. No respiratory distress. He has decreased breath sounds. He has no wheezes. He has no rhonchi. He has no rales.   Abdominal: Soft. Bowel sounds are normal.   Musculoskeletal: He exhibits no edema.   Neurological: He is alert and oriented to person, place, and time.   Skin: Skin is warm and dry. Capillary refill takes 2 to 3 seconds.   Nursing note and vitals reviewed.      Vents:  Oxygen Concentration (%): 36 (01/13/18 0500)    Lines/Drains/Airways     Peripheral Intravenous Line                 Peripheral IV - Single Lumen 01/10/18 1550 Right Wrist 3 days                Significant Labs:    CBC/Anemia Profile:    Recent Labs  Lab 01/14/18  0520   WBC 10.68    HGB 12.3*   HCT 38.6*      MCV 88   RDW 14.7*        Chemistries:    Recent Labs  Lab 18  0520      K 4.2      CO2 27   BUN 25*   CREATININE 1.0   CALCIUM 8.9       All pertinent labs within the past 24 hours have been reviewed.    Significant Imaging:  I have reviewed and interpreted all pertinent imaging results/findings within the past 24 hours.      Home Oxygen Evaluation     Date Performed: 2018     1)         Patient's Home O2 Sat on room air, while at rest: 91                               If O2 sats on room air at rest are 88% or below, patient qualifies. No additional testing needed. Document N/A in steps 2 and 3. If 89% or above, complete steps 2.        2)         Patient's O2 Sat on room air while exercisin                               If O2 sats on room air while exercising remain 89% or above patient does not qualify, no further testing needed Document N/A in step 3. If O2 sats on room air while exercising are 88% or below, continue to step 3.        3)         Patient's O2 Sat while exercising on O2:n/a at n/a LPM                                           (Must show improvement from #2 for patients to qualify)     If O2 sats improve on oxygen, patient qualifies for portable oxygen. If not, the patient does not qualify.

## 2018-01-14 NOTE — SUBJECTIVE & OBJECTIVE
"Past Medical History:   Diagnosis Date    Diabetes mellitus, type 2 1/2014    Glaucoma 2012    Hiatal hernia     Hyperlipidemia     Loss of hearing     Hearing aids 6/12 bilateral    Obesity (BMI 30-39.9)        Past Surgical History:   Procedure Laterality Date    EYE SURGERY Bilateral 2013    Cataract    GLAUCOMA SURGERY  10/12    ROTATOR CUFF REPAIR Right 3/04    TONSILLECTOMY      TOTAL KNEE ARTHROPLASTY Left     Total right knee replacement Right 11/19/15    WISDOM TOOTH EXTRACTION         Review of patient's allergies indicates:   Allergen Reactions    Biaxin [clarithromycin] Other (See Comments)     Pt unsure     Pcn [penicillins]     Metformin Rash     Rash over body        No current facility-administered medications on file prior to encounter.      Current Outpatient Prescriptions on File Prior to Encounter   Medication Sig    aspirin (ECOTRIN) 81 MG EC tablet Take 81 mg by mouth once daily.    blood sugar diagnostic Strp 1 strip by Misc.(Non-Drug; Combo Route) route once daily.    brinzolamide-brimonidine 1-0.2 % DrpS Apply to eye.    triamcinolone acetonide 0.1% (KENALOG) 0.1 % cream Apply topically 2 (two) times daily.     Family History     Problem Relation (Age of Onset)    Cancer Maternal Aunt, Maternal Uncle    Heart disease Maternal Aunt, Maternal Grandfather        Social History Main Topics    Smoking status: Never Smoker    Smokeless tobacco: Never Used    Alcohol use 0.0 oz/week      Comment: " very little"    Drug use: No    Sexual activity: Yes     Partners: Female     Review of Systems   Constitution: Negative. Negative for weight gain.   HENT: Negative.    Eyes: Negative.    Cardiovascular: Negative.  Negative for chest pain, leg swelling and palpitations.   Respiratory: Negative.  Negative for shortness of breath.    Endocrine: Negative.    Hematologic/Lymphatic: Negative.    Skin: Negative.    Musculoskeletal: Negative for muscle weakness.   Gastrointestinal: " Negative.    Genitourinary: Negative.    Neurological: Negative.  Negative for dizziness.   Psychiatric/Behavioral: Negative.    Allergic/Immunologic: Negative.      Objective:     Vital Signs (Most Recent):  Temp: 98.4 °F (36.9 °C) (01/14/18 0335)  Pulse: 95 (01/14/18 0335)  Resp: 18 (01/14/18 0335)  BP: 130/84 (01/14/18 0335)  SpO2: 96 % (01/14/18 0335) Vital Signs (24h Range):  Temp:  [97.2 °F (36.2 °C)-98.4 °F (36.9 °C)] 98.4 °F (36.9 °C)  Pulse:  [] 95  Resp:  [18-20] 18  SpO2:  [93 %-97 %] 96 %  BP: (130-163)/(63-94) 130/84     Weight: 135 kg (297 lb 9.9 oz)  Body mass index is 42.7 kg/m².    SpO2: 96 %  O2 Device (Oxygen Therapy): nasal cannula      Intake/Output Summary (Last 24 hours) at 01/14/18 0958  Last data filed at 01/13/18 1500   Gross per 24 hour   Intake              560 ml   Output              300 ml   Net              260 ml       Lines/Drains/Airways     Peripheral Intravenous Line                 Peripheral IV - Single Lumen 01/10/18 1550 Right Wrist 3 days                Physical Exam   Constitutional: He is oriented to person, place, and time. He appears well-developed and well-nourished.   HENT:   Head: Normocephalic and atraumatic.   Eyes: Conjunctivae are normal. Pupils are equal, round, and reactive to light.   Neck: Normal range of motion. Neck supple.   Cardiovascular: Normal rate, normal heart sounds and intact distal pulses.  An irregularly irregular rhythm present.   Pulmonary/Chest: Effort normal and breath sounds normal.   Abdominal: Soft. Bowel sounds are normal.   Neurological: He is alert and oriented to person, place, and time.   Skin: Skin is warm and dry.   Psychiatric: He has a normal mood and affect.   Nursing note and vitals reviewed.      Significant Labs: All pertinent lab results from the last 24 hours have been reviewed.    Significant Imaging: X-Ray: CXR: X-Ray Chest 1 View (CXR): No results found for this visit on 01/10/18.

## 2018-01-14 NOTE — DISCHARGE SUMMARY
[facility] Hospital Medicine  Discharge Summary      Patient Name: Trip Prasad  MRN: [id number]  Admission Date: 1/10/2018  Hospital Length of Stay: 3 days  Discharge Date and Time:  01/14/2018 12:25 PM  Attending Physician: Deshawn Zabala MD   Discharging Provider: Dana Ornelas NP  Primary Care Provider: Caleb Leong MD      HPI:   Trip Prasad is a 72 y.o. male patient  who was diagnosed and tx for bronchitis x 3 weeks ago (prescribed inhaler and nebulizer for wheezing) with recent diagnosis of influenza per PCP x 3 days ago ( started on Tamiflu) who presented to the Emergency Department with complaints of worsening SOB which onset gradually 3 weeks ago. Symptoms are constant and moderate in severity. No mitigating or exacerbating factors reported. Associated sxs include fatigue, cough, and congestion. Patient denies any fever, chills, diaphoresis, night sweats, headache, chest pain, palpitations, leg swelling, sore throat, rhinorrhea. Initial workup revealed abg with hypoxia. CXR with left lung base pneumonia. No leukocytosis, afebrile. Hospital Medicine consulted for admission.    * No surgery found *      Hospital Course:   Trip Prasad is a 72 year old male recently diagnosed with influenza who was admitted to [facility] with pneumonia of left lower lobe. He was treated with IV abx, supplemental oxygen, and bronchodilators. Tamiflu was also continued for influenza. CT Chest showed subpleural reticular opacities that could be seen with UIP. He was seen by Pulmonology who ordered autoimmune serologies. Received systemic steroids as well for wheezing(clinical history did not support asthma or RAD). Patient developed PAF with RVR with improved with Cardizem IV and Metoprolol IV x 1. He was seen by Cardiology who recommended Eliquis for CVA prophylaxis. Risk and benefits explained including lack of antidote.  Echocardiogram showed normal LVEF without acute abnormalities.  He will also continue Metoprolol for rate control. She will need outpatient stress test one he recovers from pneumonia. Appt has been scheduled with Dr. Burgos 1/31/18.                 Consults:   Consults         Status Ordering Provider     Inpatient consult to Cardiology  Once     Provider:  Jose Burgos MD    Completed MIKHAIL FLYNN     Inpatient consult to Pulmonology  Once     Provider:  Taqueria Garcia MD    Acknowledged CLAUDIO DAY          No new Assessment & Plan notes have been filed under this hospital service since the last note was generated.  Service: Hospital Medicine    Final Active Diagnoses:    Diagnosis Date Noted POA    Atrial fibrillation [I48.91] 01/14/2018 No    Wheezing without diagnosis of asthma [R06.2] 01/11/2018 Yes    Cough [R05] 09/12/2016 Yes    Diabetes mellitus, type 2 [E11.9]  Yes     Chronic      Problems Resolved During this Admission:    Diagnosis Date Noted Date Resolved POA    PRINCIPAL PROBLEM:  Pneumonia of left lower lobe due to infectious organism [J18.1] 01/10/2018 01/14/2018 Yes    Positive D dimer [R79.89] 01/13/2018 01/14/2018 No    Lung infiltrate on CT [R91.8] 01/12/2018 01/14/2018 Yes    Influenza [J11.1] 01/10/2018 01/14/2018 Yes    Acute hypoxemic Resp Failure [R06.09] 09/12/2016 01/14/2018 Yes       Discharged Condition: stable    Disposition: Home or Self Care    Follow Up:  Follow-up Information     Caleb Leong MD. Schedule an appointment as soon as possible for a visit in 3 days.    Specialty:  Internal Medicine  Why:  Hospital follow up  Contact information:  [address] [place] [address] [phone]             Taqueria Garcia MD. Schedule an appointment as soon as possible for a visit in 2 weeks.    Specialty:  Pulmonary Disease  Why:  Hospital follow up for LLL pneumonia, severe deconditioning, r/o LIZETTE, needs pulm rehab  Contact information:  [address]  [place] [address] [phone]             Juany Gomez  ROSALINDA Schedule an appointment as soon as possible for a visit in 1 week.    Specialty:  Cardiology  Why:  Hospital follow up for PAF  Contact information:  [address] [place] [address] [address] [place] [address] [phone]                 Patient Instructions:     Diet diabetic     Activity as tolerated         Significant Diagnostic Studies: Labs:   CMP   Recent Labs  Lab 01/14/18 0520      K 4.2      CO2 27   *   BUN 25*   CREATININE 1.0   CALCIUM 8.9   ANIONGAP 8   ESTGFRAFRICA >60   EGFRNONAA >60    and CBC   Recent Labs  Lab 01/14/18 0520   WBC 10.68   HGB 12.3*   HCT 38.6*          Pending Diagnostic Studies:     Procedure Component Value Units Date/Time    2D echo only [[id number]]     Order Status:  Sent Lab Status:  No result     DOMINIC [[id number]] Collected:  01/12/18 0740    Order Status:  Sent Lab Status:  In process Updated:  01/12/18 1823    Specimen:  Blood from Blood     Anti-neutrophilic cytoplasmic antibody [[id number]] Collected:  01/12/18 0740    Order Status:  Sent Lab Status:  In process Updated:  01/12/18 1417    Specimen:  Blood from Blood     Aspergillus antigen [[id number]] Collected:  01/12/18 0740    Order Status:  Sent Lab Status:  In process Updated:  01/12/18 0740    Specimen:  Blood from Blood     Aspergillus fumagatus IgE [[id number]] Collected:  01/12/18 0740    Order Status:  Sent Lab Status:  In process Updated:  01/12/18 1823    Specimen:  Blood from Blood     CT Soft Tissue Neck With Contrast [[id number]] Resulted:  01/11/18 1819    Order Status:  Sent Lab Status:  In process Updated:  01/11/18 1850    Fungal Precipitins (Hypersensitivity PneumonitisI) [[id number]] Collected:  01/12/18 0740    Order Status:  Sent Lab Status:  In process Updated:  01/12/18 1823    Specimen:  Blood from Blood     TSH [[id number]] Collected:  01/14/18 1004    Order Status:  Sent Lab Status:  In process Updated:  01/14/18 1004    Specimen:  Blood from Blood           Medications:  Reconciled Home Medications:   Current Discharge Medication List      START taking these medications    Details   apixaban 5 mg Tab Take 1 tablet (5 mg total) by mouth 2 (two) times daily.  Qty: 60 tablet, Refills: 1      metoprolol tartrate (LOPRESSOR) 25 MG tablet Take 1 tablet (25 mg total) by mouth 2 (two) times daily.  Qty: 60 tablet, Refills: 1      moxifloxacin (AVELOX) 400 mg tablet Take 1 tablet (400 mg total) by mouth once daily.  Qty: 7 tablet, Refills: 0      pantoprazole (PROTONIX) 40 MG tablet Take 1 tablet (40 mg total) by mouth 2 (two) times daily before meals.  Qty: 60 tablet, Refills: 1      predniSONE (DELTASONE) 20 MG tablet Take 0.5 tablets (10 mg total) by mouth once daily. Take 1 tab twice daily with food for three days then 1 tab daily x 3 days then half a tab x 3 days then stop  Qty: 20 tablet, Refills: 0         CONTINUE these medications which have NOT CHANGED    Details   aspirin (ECOTRIN) 81 MG EC tablet Take 81 mg by mouth once daily.      montelukast (SINGULAIR) 10 mg tablet Take 10 mg by mouth every evening.      blood sugar diagnostic Strp 1 strip by Misc.(Non-Drug; Combo Route) route once daily.  Qty: 100 strip, Refills: 3    Comments: ONE TOUCH   VERIO  Associated Diagnoses: Hyperglycemia      brinzolamide-brimonidine 1-0.2 % DrpS Apply to eye.         STOP taking these medications       oseltamivir (TAMIFLU) 30 MG capsule Comments:   Reason for Stopping:         triamcinolone acetonide 0.1% (KENALOG) 0.1 % cream Comments:   Reason for Stopping:               Indwelling Lines/Drains at time of discharge:   Lines/Drains/Airways          No matching active lines, drains, or airways          Time spent on the discharge of patient: >35 minutes  Patient was seen and examined on the date of discharge and determined to be suitable for discharge.     Dana Ornelas NP  Department of Hospital Medicine  [facility]

## 2018-01-14 NOTE — PROGRESS NOTES
Home Oxygen Evaluation    Date Performed: 2018    1) Patient's Home O2 Sat on room air, while at rest: 91        If O2 sats on room air at rest are 88% or below, patient qualifies. No additional testing needed. Document N/A in steps 2 and 3. If 89% or above, complete steps 2.      2) Patient's O2 Sat on room air while exercisin        If O2 sats on room air while exercising remain 89% or above patient does not qualify, no further testing needed Document N/A in step 3. If O2 sats on room air while exercising are 88% or below, continue to step 3.      3) Patient's O2 Sat while exercising on O2:n/a at n/a LPM         (Must show improvement from #2 for patients to qualify)    If O2 sats improve on oxygen, patient qualifies for portable oxygen. If not, the patient does not qualify.

## 2018-01-14 NOTE — PLAN OF CARE
Problem: Patient Care Overview  Goal: Plan of Care Review  Outcome: Ongoing (interventions implemented as appropriate)  POC reviewed, verbalized understanding. Pt remained free from falls, fall precautions in place. Pt is Afib on monitor, 90-100s. O2 NC. No other c/o at this time. PIV intact. Blood glucose monitored. Son at bedside. Call bell and personal belongings within reach. Hourly rounding complete. Reminded to call for assistance. Will continue to monitor.

## 2018-01-14 NOTE — ASSESSMENT & PLAN NOTE
Wheeze over vocal cords  Either VCD,   May need ENT eval    [facility] Hx doesn't support asthma or RAD    CT neck was unremarkable  Continue steroids ( changed to PO), Nebs  Prednisone taper

## 2018-01-15 LAB
A FUMIGATUS IGE QN: <0.35 KU/L
ANA SER QL IF: NORMAL
ANCA AB TITR SER IF: NORMAL TITER
BACTERIA SPEC AEROBE CULT: NORMAL
DEPRECATED A FUMIGATUS IGE RAST QL: NORMAL
GRAM STN SPEC: NORMAL
P-ANCA TITR SER IF: NORMAL TITER

## 2018-01-15 NOTE — PLAN OF CARE
01/15/18 0902   Final Note   Assessment Type Final Discharge Note   Discharge Disposition Home

## 2018-01-16 ENCOUNTER — TELEPHONE (OUTPATIENT)
Dept: CARDIOLOGY | Facility: CLINIC | Age: 73
End: 2018-01-16

## 2018-01-16 LAB
BACTERIA BLD CULT: NORMAL
BACTERIA BLD CULT: NORMAL

## 2018-01-16 NOTE — TELEPHONE ENCOUNTER
----- Message from Juany Gomez PA-C sent at 1/16/2018 10:36 AM CST -----  Needs hosp f/u with me late this week    Thanks

## 2018-01-17 LAB
A FUMIGATUS1 AB SER QL ID: NORMAL
A FUMIGATUS6 AB SER QL ID: NORMAL
A PULLULANS AB SER QL ID: NORMAL
PIGEON SERUM AB QL ID: NORMAL
S RECTIVIRGULA AB SER QL ID: NORMAL
T VULGARIS1 AB SER QL ID: NORMAL

## 2018-01-18 ENCOUNTER — OFFICE VISIT (OUTPATIENT)
Dept: CARDIOLOGY | Facility: CLINIC | Age: 73
End: 2018-01-18
Payer: MEDICARE

## 2018-01-18 VITALS
WEIGHT: 292.13 LBS | DIASTOLIC BLOOD PRESSURE: 80 MMHG | BODY MASS INDEX: 41.82 KG/M2 | HEART RATE: 80 BPM | HEIGHT: 70 IN | SYSTOLIC BLOOD PRESSURE: 120 MMHG

## 2018-01-18 DIAGNOSIS — E78.00 PURE HYPERCHOLESTEROLEMIA: Primary | Chronic | ICD-10-CM

## 2018-01-18 DIAGNOSIS — E11.9 TYPE 2 DIABETES MELLITUS WITHOUT COMPLICATION, WITHOUT LONG-TERM CURRENT USE OF INSULIN: Chronic | ICD-10-CM

## 2018-01-18 DIAGNOSIS — I48.0 PAROXYSMAL ATRIAL FIBRILLATION: ICD-10-CM

## 2018-01-18 PROCEDURE — 99215 OFFICE O/P EST HI 40 MIN: CPT | Mod: S$PBB,,, | Performed by: INTERNAL MEDICINE

## 2018-01-18 PROCEDURE — 99999 PR PBB SHADOW E&M-EST. PATIENT-LVL III: CPT | Mod: PBBFAC,,, | Performed by: INTERNAL MEDICINE

## 2018-01-18 PROCEDURE — 99213 OFFICE O/P EST LOW 20 MIN: CPT | Mod: PBBFAC,PO | Performed by: INTERNAL MEDICINE

## 2018-01-18 NOTE — PROGRESS NOTES
Subjective:   Patient ID:  Trip Prasad is a 72 y.o. male who presents for follow-up of new onset afib and pneumonia.  Pt started on eliquis and tolerating.Patient denies CP, angina or anginal equivalent.    Palpitations    This is a recurrent problem. The current episode started in the past 7 days. The problem occurs intermittently. The problem has been gradually improving. Nothing aggravates the symptoms. Pertinent negatives include no chest pain, dizziness or shortness of breath. He has tried beta blockers for the symptoms. The treatment provided mild relief.       Review of Systems   Constitution: Negative. Negative for weight gain.   HENT: Negative.    Eyes: Negative.    Cardiovascular: Positive for palpitations. Negative for chest pain and leg swelling.   Respiratory: Negative.  Negative for shortness of breath.    Endocrine: Negative.    Hematologic/Lymphatic: Negative.    Skin: Negative.    Musculoskeletal: Negative for muscle weakness.   Gastrointestinal: Negative.    Genitourinary: Negative.    Neurological: Negative.  Negative for dizziness.   Psychiatric/Behavioral: Negative.    Allergic/Immunologic: Negative.      Family History   Problem Relation Age of Onset    Cancer Maternal Aunt     Heart disease Maternal Aunt     Heart disease Maternal Grandfather     Cancer Maternal Uncle      Past Medical History:   Diagnosis Date    Diabetes mellitus, type 2 1/2014    Glaucoma 2012    Hiatal hernia     Hyperlipidemia     Loss of hearing     Hearing aids 6/12 bilateral    Obesity (BMI 30-39.9)      Current Outpatient Prescriptions on File Prior to Visit   Medication Sig Dispense Refill    apixaban 5 mg Tab Take 1 tablet (5 mg total) by mouth 2 (two) times daily. 60 tablet 1    aspirin (ECOTRIN) 81 MG EC tablet Take 81 mg by mouth once daily.      blood sugar diagnostic Strp 1 strip by Misc.(Non-Drug; Combo Route) route once daily. 100 strip 3    brinzolamide-brimonidine 1-0.2 % DrpS Apply to  eye.      metoprolol tartrate (LOPRESSOR) 25 MG tablet Take 1 tablet (25 mg total) by mouth 2 (two) times daily. 60 tablet 1    moxifloxacin (AVELOX) 400 mg tablet Take 1 tablet (400 mg total) by mouth once daily. 7 tablet 0    pantoprazole (PROTONIX) 40 MG tablet Take 1 tablet (40 mg total) by mouth 2 (two) times daily before meals. 60 tablet 1    predniSONE (DELTASONE) 20 MG tablet Take 0.5 tablets (10 mg total) by mouth once daily. Take 1 tab twice daily with food for three days then 1 tab daily x 3 days then half a tab x 3 days then stop 20 tablet 0    montelukast (SINGULAIR) 10 mg tablet Take 10 mg by mouth every evening.       No current facility-administered medications on file prior to visit.      Review of patient's allergies indicates:   Allergen Reactions    Biaxin [clarithromycin] Other (See Comments)     Pt unsure     Pcn [penicillins]     Metformin Rash     Rash over body        Objective:     Physical Exam   Constitutional: He is oriented to person, place, and time. He appears well-developed and well-nourished.   HENT:   Head: Normocephalic and atraumatic.   Eyes: Conjunctivae are normal. Pupils are equal, round, and reactive to light.   Neck: Normal range of motion. Neck supple.   Cardiovascular: Normal rate, regular rhythm, normal heart sounds and intact distal pulses.    Pulmonary/Chest: Effort normal and breath sounds normal.   Abdominal: Soft. Bowel sounds are normal.   Neurological: He is alert and oriented to person, place, and time.   Skin: Skin is warm and dry.   Psychiatric: He has a normal mood and affect.   Nursing note and vitals reviewed.      Assessment:     1. Pure hypercholesterolemia    2. Paroxysmal atrial fibrillation    3. Type 2 diabetes mellitus without complication, without long-term current use of insulin        Plan:     Pure hypercholesterolemia    Paroxysmal atrial fibrillation    Type 2 diabetes mellitus without complication, without long-term current use of  insulin      Nuclear stress test  Continue metoprolol,eliquis- afib

## 2018-01-26 ENCOUNTER — TELEPHONE (OUTPATIENT)
Dept: CARDIOLOGY | Facility: CLINIC | Age: 73
End: 2018-01-26

## 2018-01-26 DIAGNOSIS — E78.2 MIXED HYPERLIPIDEMIA: ICD-10-CM

## 2018-01-26 DIAGNOSIS — I48.91 ATRIAL FIBRILLATION, UNSPECIFIED TYPE: Primary | ICD-10-CM

## 2018-01-26 NOTE — TELEPHONE ENCOUNTER
----- Message from Briseyda Muhammad sent at 1/26/2018  9:28 AM CST -----  Contact: Pt  Please call pt at .[phone] (home) [phone]  Pt would like to discuss orders being sent to his

## 2018-01-26 NOTE — TELEPHONE ENCOUNTER
I spoke with pts wife.  They are asking if Dr Burgos is going to want or anticipate any bloodwork that may be needed for his upcomming visit.     If so he would like to get it done when he sees his pcp Dr Leong  Requesting orders for any labs sent to Dr Jones office  Phone [phone]    Please advise if you would like pt to have any lab work for upcomming visit 3/22/18

## 2018-01-29 ENCOUNTER — TELEPHONE (OUTPATIENT)
Dept: CARDIOLOGY | Facility: CLINIC | Age: 73
End: 2018-01-29

## 2018-01-29 NOTE — TELEPHONE ENCOUNTER
----- Message from Marily Parry sent at 1/29/2018  2:09 PM CST -----  Contact: Jass Leong's  She's returning a missed call, she stated to leave a message on the nurse line, please advise [phone] option 3

## 2018-01-29 NOTE — TELEPHONE ENCOUNTER
I rtc and left v/m that I see where [facility] sent an order for fasting lab for lipid panel to be done and rec'd confirmation it went across. Please contact me Rosalba if anything else is needed. I am with Dr Burgos today at [phone].cm    ----- Message from Ameena Wetzel sent at 1/29/2018 11:06 AM CST -----  Contact:   returning a missed call to Martita, no additional info given, can be reached at  [phone]///[email or web address]

## 2018-01-29 NOTE — TELEPHONE ENCOUNTER
I called and spoke with Yoko and she never rec'd fax with lab ordrer. She asked if I would fax to her at [phone] and I agreed cm

## 2018-01-30 NOTE — PHYSICIAN QUERY
"PT Name: Trip Prasad  MR #: [id number]     Physician Query Form - Diagnosis Clarification      CDS: Genny Hensley RN, CCDS         Contact information :[phone]  [email or web address]       This form is a permanent document in the medical record.     Query Date: January 30, 2018    By submitting this query, we are merely seeking further clarification of documentation.  Please utilize your independent clinical judgment when addressing the question(s) below.     The medical record contains the following:      Findings Supporting Clinical Information Location in Medical Record     "All pt's sx appear to be related to COPD exacerbation with acute bronchitis and severe physical deconditioning"                 "Several provider treatments for wheezing /bronchitis over last 3 months.  Thinks was provoked from exposure to dry leaves on his property  Associated cough, No dysphagia or hemoptysis  Wheeze better if extends neck  Denies smoking or occupational toxin exposures.  No Hx of asthma or COPD"  "Wheezing without diagnosis of asthma"    "PRINCIPAL PROBLEM:  Pneumonia of left lower lobe due to infectious organism"  "Wheezing without diagnosis of asthma"    "CT Chest showed subpleural reticular opacities that could be seen with UIP. He was seen by Pulmonology who ordered autoimmune serologies. Received systemic steroids as well for wheezing(clinical history did not support asthma or RAD)."     Hospital medicine progress note 1/13/18        Pulmonology progress note  1/14/18                      Discharge summary 1/15/18      Discharge summary 1/15/18     Please clarify if the  COPD exacerbation with acute bronchitis   diagnosis has been:    [  ] Ruled In    [  ] Ruled Out    [x  ] Clinically undetermined    [  ] Other/Clarification of findings (please specify)_______________________________    Please document in your progress notes daily for the duration of treatment, until resolved, and include in your " discharge summary.

## 2018-01-31 ENCOUNTER — CLINICAL SUPPORT (OUTPATIENT)
Dept: CARDIOLOGY | Facility: CLINIC | Age: 73
End: 2018-01-31
Attending: INTERNAL MEDICINE
Payer: MEDICARE

## 2018-01-31 ENCOUNTER — HOSPITAL ENCOUNTER (OUTPATIENT)
Dept: RADIOLOGY | Facility: HOSPITAL | Age: 73
Discharge: HOME | End: 2018-01-31
Attending: INTERNAL MEDICINE
Payer: MEDICARE

## 2018-01-31 DIAGNOSIS — I48.0 PAROXYSMAL ATRIAL FIBRILLATION: ICD-10-CM

## 2018-01-31 PROCEDURE — 78452 HT MUSCLE IMAGE SPECT MULT: CPT | Mod: 26,,, | Performed by: INTERNAL MEDICINE

## 2018-01-31 PROCEDURE — A9502 TC99M TETROFOSMIN: HCPCS | Mod: PO

## 2018-01-31 PROCEDURE — 93018 CV STRESS TEST I&R ONLY: CPT | Mod: S$PBB,,, | Performed by: INTERNAL MEDICINE

## 2018-01-31 PROCEDURE — 93016 CV STRESS TEST SUPVJ ONLY: CPT | Mod: S$PBB,,, | Performed by: INTERNAL MEDICINE

## 2018-02-01 LAB — DIASTOLIC DYSFUNCTION: NO

## 2018-02-02 ENCOUNTER — TELEPHONE (OUTPATIENT)
Dept: CARDIOLOGY | Facility: CLINIC | Age: 73
End: 2018-02-02

## 2018-02-02 ENCOUNTER — HOSPITAL ENCOUNTER (OUTPATIENT)
Dept: RADIOLOGY | Facility: HOSPITAL | Age: 73
Discharge: HOME | End: 2018-02-02
Attending: NURSE PRACTITIONER
Payer: MEDICARE

## 2018-02-02 ENCOUNTER — OFFICE VISIT (OUTPATIENT)
Dept: PULMONOLOGY | Facility: CLINIC | Age: 73
End: 2018-02-02
Payer: MEDICARE

## 2018-02-02 VITALS
BODY MASS INDEX: 42.2 KG/M2 | WEIGHT: 294.75 LBS | SYSTOLIC BLOOD PRESSURE: 120 MMHG | HEIGHT: 70 IN | RESPIRATION RATE: 17 BRPM | DIASTOLIC BLOOD PRESSURE: 72 MMHG | OXYGEN SATURATION: 96 % | HEART RATE: 84 BPM

## 2018-02-02 DIAGNOSIS — J98.6 ELEVATED DIAPHRAGM: ICD-10-CM

## 2018-02-02 DIAGNOSIS — J11.00 INFLUENZA WITH PNEUMONIA, ANY FORM: ICD-10-CM

## 2018-02-02 DIAGNOSIS — R06.02 SOB (SHORTNESS OF BREATH): ICD-10-CM

## 2018-02-02 DIAGNOSIS — J18.9 PNEUMONIA DUE TO INFECTIOUS ORGANISM, UNSPECIFIED LATERALITY, UNSPECIFIED PART OF LUNG: Primary | ICD-10-CM

## 2018-02-02 DIAGNOSIS — R06.2 WHEEZING: ICD-10-CM

## 2018-02-02 PROCEDURE — 99214 OFFICE O/P EST MOD 30 MIN: CPT | Mod: PBBFAC,PO | Performed by: NURSE PRACTITIONER

## 2018-02-02 PROCEDURE — 99999 PR PBB SHADOW E&M-EST. PATIENT-LVL IV: CPT | Mod: PBBFAC,,, | Performed by: NURSE PRACTITIONER

## 2018-02-02 PROCEDURE — 76000 FLUOROSCOPY <1 HR PHYS/QHP: CPT | Mod: 26,,, | Performed by: RADIOLOGY

## 2018-02-02 PROCEDURE — 1159F MED LIST DOCD IN RCRD: CPT | Mod: ,,, | Performed by: NURSE PRACTITIONER

## 2018-02-02 PROCEDURE — 99215 OFFICE O/P EST HI 40 MIN: CPT | Mod: S$PBB,,, | Performed by: NURSE PRACTITIONER

## 2018-02-02 PROCEDURE — 76000 FLUOROSCOPY <1 HR PHYS/QHP: CPT | Mod: TC,PO

## 2018-02-02 RX ORDER — PREDNISONE 20 MG/1
20 TABLET ORAL DAILY
COMMUNITY
End: 2018-02-22

## 2018-02-02 NOTE — LETTER
February 2, 2018      Caleb Leong MD  [address] [place] [address]           [facility]  [address]  [place] [address]  Phone: [phone]  Fax: [phone]          Patient: Trip Prasad   MR Number: [id number]   YOB: 1945   Date of Visit: 2/2/2018       Dear Dr. Caleb Leong:    Thank you for referring Trip Prasad to me for evaluation. Attached you will find relevant portions of my assessment and plan of care.    If you have questions, please do not hesitate to call me. I look forward to following Trip Prasad along with you.    Sincerely,    Elizabeth Lejeune, DRAKE    Enclosure  CC:  No Recipients    If you would like to receive this communication electronically, please contact [email or web address] or [phone] to request more information on [employer] Link access.    For providers and/or their staff who would like to refer a patient to [facility], please contact us through our one-stop-shop provider referral line, [facility] , at [phone].    If you feel you have received this communication in error or would no longer like to receive these types of communications, please e-mail [email or web address]

## 2018-02-02 NOTE — TELEPHONE ENCOUNTER
----- Message from Jose Burgos MD sent at 2/2/2018  6:21 AM CST -----  Please tell pt stress test is normal

## 2018-02-02 NOTE — PROGRESS NOTES
"Subjective:      Patient ID: Trip Prasad is a 72 y.o. male.    Chief Complaint: Shortness of Breath    HPI   Seen by Dr. Garcia in hospital. Admission 1/10-1/14/18.  Bronchitis treatment before flu for up to3  Months. No hx of asthma but had wheezing and cough.  Patient states symptoms started off with yard work and cleaning leaves in the yard which were in a pile for a while with moisture.  He started feeling better after first treatment, then started working with the leaves again, and his symptoms returned.   He presented to hospital with positive flu and appearance of possible UIP on CT scan    Wheezing over vocal cords -VCD?  Also recommended SNIFF due to elevated diaph        Symptoms have improved significantly.  He has a mild cough at times or if he takes a very deep breath.  No wheezing.  She is now walking for exercise 30 minutes a day to build strength.      Hospital Course:   Trip Prasad is a 72 year old male recently diagnosed with influenza who was admitted to [facility] with pneumonia of left lower lobe. He was treated with IV abx, supplemental oxygen, and bronchodilators. Tamiflu was also continued for influenza. CT Chest showed subpleural reticular opacities that could be seen with UIP. He was seen by Pulmonology who ordered autoimmune serologies. Received systemic steroids as well for wheezing(clinical history did not support asthma or RAD). Patient developed PAF with RVR with improved with Cardizem IV and Metoprolol IV x 1. He was seen by Cardiology who recommended Eliquis for CVA prophylaxis. Risk and benefits explained including lack of antidote.  Echocardiogram showed normal LVEF without acute abnormalities. He will also continue Metoprolol for rate control. She will need outpatient stress test one he recovers from pneumonia. Appt has been scheduled with Dr. Burgos 1/31/18.     /72   Pulse 84   Resp 17   Ht 5' 10" (1.778 m)   Wt 133.7 kg (294 lb 12.1 oz)   SpO2 " 96%   BMI 42.29 kg/m²   Body mass index is 42.29 kg/m².    Review of Systems   Constitutional: Negative.    HENT: Negative.    Respiratory: Negative.    Cardiovascular: Negative.    Musculoskeletal: Negative.    Gastrointestinal: Negative.    Neurological: Negative.    Psychiatric/Behavioral: Negative.      Objective:      Physical Exam   Constitutional: He is oriented to person, place, and time. He appears well-developed and well-nourished.   HENT:   Head: Normocephalic and atraumatic.   Nose: Nose normal.   Mouth/Throat: Uvula is midline and oropharynx is clear and moist.   Neck: Trachea normal and normal range of motion. Neck supple. No thyroid mass and no thyromegaly present.   Cardiovascular: Normal rate, regular rhythm and normal heart sounds.    Pulmonary/Chest: Effort normal and breath sounds normal. He has no wheezes. He has no rhonchi. He has no rales. Chest wall is not dull to percussion.   Abdominal: Soft. He exhibits no mass. There is no hepatosplenomegaly or splenomegaly. There is no tenderness.   Musculoskeletal: Normal range of motion. He exhibits no edema.   Neurological: He is alert and oriented to person, place, and time.   Skin: Skin is warm and dry.   Psychiatric: He has a normal mood and affect.     Personal Diagnostic Review  COMPARISON STUDIES: CT chest January 11, 2018    FINDINGS:  No evidence of pulmonary embolus.  Normal enhancement of the aorta with mild atherosclerosis.  Negative for aneurysm or dissection.    No pericardial effusions.  Small amount coronary artery calcifications.  Scattered small subcentimeter mediastinal lymph nodes without significant enlargement.    No pleural effusions.  No consolidation.  Again noted are a few scattered areas of subsegmental atelectasis and/or scarring.  Some dependent atelectasis greater on the left.  Some scattered peripheral areas of groundglass density are noted with minimal interstitial thickening similar to that seen previously which could  be acute or chronic lung changes.  No significant change from the only recent comparison examination.    Marked fatty infiltration of the liver.   Impression       Negative for pulmonary embolus.    Other findings are stable.         Sputum culture normal  Fungal lab negative  DOMINIC, ANCA (-)    CXR with elevated diaphragm    CT 1/11- Subpleural reticular opacities are seen throughout the lower lobes which can be seen with UIP    Assessment:       1. Pneumonia due to infectious organism, unspecified laterality, unspecified part of lung    2. Wheezing    3. Influenza with pneumonia, any form    4. Elevated diaphragm    5. SOB (shortness of breath)        Outpatient Encounter Prescriptions as of 2/2/2018   Medication Sig Dispense Refill    apixaban 5 mg Tab Take 1 tablet (5 mg total) by mouth 2 (two) times daily. 60 tablet 1    aspirin (ECOTRIN) 81 MG EC tablet Take 81 mg by mouth once daily.      blood sugar diagnostic Strp 1 strip by Misc.(Non-Drug; Combo Route) route once daily. 100 strip 3    brinzolamide-brimonidine 1-0.2 % DrpS Apply to eye.      metoprolol tartrate (LOPRESSOR) 25 MG tablet Take 1 tablet (25 mg total) by mouth 2 (two) times daily. 60 tablet 1    montelukast (SINGULAIR) 10 mg tablet Take 10 mg by mouth every evening.      pantoprazole (PROTONIX) 40 MG tablet Take 1 tablet (40 mg total) by mouth 2 (two) times daily before meals. 60 tablet 1    predniSONE (DELTASONE) 20 MG tablet Take 20 mg by mouth once daily.      moxifloxacin (AVELOX) 400 mg tablet Take 1 tablet (400 mg total) by mouth once daily. 7 tablet 0     No facility-administered encounter medications on file as of 2/2/2018.      Orders Placed This Encounter   Procedures    FL Fluoro of Diaphragm     Standing Status:   Future     Standing Expiration Date:   2/2/2019     Order Specific Question:   May the Radiologist modify the order per protocol to meet the clinical needs of the patient?     Answer:   Yes    Stress test,  pulmonary     Standing Status:   Future     Standing Expiration Date:   2/2/2019    Complete PFT with bronchodilator     Standing Status:   Future     Standing Expiration Date:   2/2/2019     Plan:      PFT, 6 minute walk, SNIFF.   Continue rest of prescription of prednisone.  Finished Avelox.  Reviewed hospital test results with patient.  He is improving daily.  Follow up after pulmonary test.    Total time spent in face to face counseling and coordination of care -  40minutes over 50% of time was used in discussion of prognosis, risks, benefits of treatment, instructions and compliance with regimen . Discussion with other physicians or health care providers occurred.

## 2018-02-14 ENCOUNTER — PROCEDURE VISIT (OUTPATIENT)
Dept: PULMONOLOGY | Facility: CLINIC | Age: 73
End: 2018-02-14
Payer: MEDICARE

## 2018-02-14 VITALS — WEIGHT: 297 LBS | BODY MASS INDEX: 41.58 KG/M2 | HEIGHT: 71 IN

## 2018-02-14 DIAGNOSIS — R06.02 SOB (SHORTNESS OF BREATH): ICD-10-CM

## 2018-02-14 LAB
POST FEF 25 75: 0.97 L/S (ref 1.71–3.33)
POST FET 100: 11.5 S
POST FEV1 FVC: 68 %
POST FEV1: 1.79 L (ref 2.92–3.71)
POST FIF 50: 4.48 L/S
POST FVC: 2.63 L (ref 4.03–4.98)
POST PEF: 5.26 L/S (ref 7.29–9.64)
PRE DLCO: 14.26 ML/MMHG/MIN (ref 24.48–32.77)
PRE ERV: 0.4 L
PRE FEF 25 75: 0.88 L/S (ref 1.71–3.33)
PRE FET 100: 13.42 S
PRE FEV1 FVC: 67 %
PRE FEV1: 1.86 L (ref 2.92–3.71)
PRE FIF 50: 4.48 L/S
PRE FRC PL: 2.08 L (ref 2.36–3.57)
PRE FVC: 2.78 L (ref 4.03–4.98)
PRE KROGHS K: 3.54 1/MIN
PRE PEF: 5.5 L/S (ref 7.29–9.64)
PRE RV: 1.68 L (ref 2.18–2.97)
PRE SVC: 2.78 L
PRE TLC: 4.46 L (ref 6.13–7.13)
PREDICTED DLCO: 28.63 ML/MMHG/MIN (ref 24.48–32.77)
PREDICTED FEV1 FVC: 73.6 % (ref 68.76–78.44)
PREDICTED FEV1: 3.32 L (ref 2.92–3.71)
PREDICTED FRC N2: 2.96 L (ref 2.36–3.57)
PREDICTED FRC PL: 2.96 L (ref 2.36–3.57)
PREDICTED FVC: 4.51 L (ref 4.03–4.98)
PREDICTED RV: 2.58 L (ref 2.18–2.97)
PREDICTED SVC: 4.24 L
PREDICTED TLC: 6.63 L (ref 6.13–7.13)
PROVOCATION PROTOCOL: ABNORMAL

## 2018-02-14 PROCEDURE — 94726 PLETHYSMOGRAPHY LUNG VOLUMES: CPT | Mod: 26,S$PBB,, | Performed by: INTERNAL MEDICINE

## 2018-02-14 PROCEDURE — 94060 EVALUATION OF WHEEZING: CPT | Mod: PBBFAC,59

## 2018-02-14 PROCEDURE — 94729 DIFFUSING CAPACITY: CPT | Mod: 26,S$PBB,, | Performed by: INTERNAL MEDICINE

## 2018-02-14 PROCEDURE — 94726 PLETHYSMOGRAPHY LUNG VOLUMES: CPT | Mod: PBBFAC

## 2018-02-14 PROCEDURE — 94729 DIFFUSING CAPACITY: CPT | Mod: PBBFAC

## 2018-02-14 PROCEDURE — 94618 PULMONARY STRESS TESTING: CPT | Mod: PBBFAC

## 2018-02-14 PROCEDURE — 94618 PULMONARY STRESS TESTING: CPT | Mod: 26,S$PBB,, | Performed by: INTERNAL MEDICINE

## 2018-02-14 PROCEDURE — 94060 EVALUATION OF WHEEZING: CPT | Mod: 26,59,S$PBB, | Performed by: INTERNAL MEDICINE

## 2018-02-14 NOTE — PROCEDURES
"[facility] - Pul[facility]  Six Minute Walk     SUMMARY     Ordering Provider: Elizabeth LeJeune Np   Interpreting Provider: Dr Plasencia  Performing nurse/tech/RT: KRISH Nevarez RRT  Diagnosis: Shortness of Breath  Height: 5' 10.5" (179.1 cm)  Weight: 134.7 kg (297 lb)  BMI (Calculated): 42.1   Patient Race:             Phase Oxygen Assessment Supplemental O2 Heart   Rate Blood Pressure Zack Dyspnea Scale Rating   Resting 96 % Room Air 76 bpm 139/61 0   Exercise        Minute        1 92 % Room Air 98 bpm     2 92 % Room Air 107 bpm     3 92 % Room Air 102 bpm     4 93 % Room Air 103 bpm     5 93 % Room Air 106 bpm     6  94 % Room Air 108 bpm 171/59 3   Recovery        Minute        1 97 % Room Air 80 bpm     2 97 % Room Air 75 bpm     3 97 % Room Air 72 bpm     4 96 % Room Air 74 bpm 123/66 2     Six Minute Walk Summary  6MWT Status: completed without stopping  Patient Reported: No complaints     Interpretation:  Did the patient stop or pause?: No                                         Total Time Walked (Calculated): 360 seconds  Final Partial Lap Distance (feet): 0 feet  Total Distance Meters (Calculated): 365.76 meters  Predicted Distance Meters (Calculated): 458.28 meters  Percentage of Predicted (Calculated): 79.81  Peak VO2 (Calculated): 14.95  Mets: 4.27  Has The Patient Had a Previous Six Minute Walk Test?: No       Previous 6MWT Results  Has The Patient Had a Previous Six Minute Walk Test?: No  Interpretation:  Total distance walked in six minutes is mildly reduced indicating an mild reduction in  functional capacity.  There was significant oxygen desaturation with exercise (oxygen saturation less than 88%). Clinical correlation suggested.    Marc Ordaz MD    Interpretation:  Total distance walked in six minutes is mildly reduced 79% indicating a reduction in overall  functional capacity. There was no exercise induced hypoxemia with significant oxygen desaturation.    Oxygen desaturation did not meet " criteria for supplemental oxygen prescription.    Clinical correlation suggested.    Marc Ordaz MD    Mild exercise-induced hypoxemia described as an arterial oxygen saturation of 93-95% (or 3-4% less than at rest), moderate exercise-induced hypoxemia as 89-93%, and severe exercise induced hypoxemia as < 89% O2 saturation.  Medicare Criteria Comments:   When arterial oxygen saturation is at or below 88% during exercise (severe exercise induced hypoxemia) then the patient falls under Medicare Group 1 criteria for supplemental oxygen.  Details about Medicare Group Criteria coverage can be found at http://www.cms.hhs.gov/manuals/downloads/

## 2018-02-16 ENCOUNTER — TELEPHONE (OUTPATIENT)
Dept: CARDIOLOGY | Facility: CLINIC | Age: 73
End: 2018-02-16

## 2018-02-16 NOTE — TELEPHONE ENCOUNTER
I called and spoke with wife, pt is at the store. She was upset about him taking all these meds and wants pt seen sooner to discuss meds. Appt moved up. faisal

## 2018-02-16 NOTE — TELEPHONE ENCOUNTER
----- Message from Carlyn Castro sent at 2/16/2018 10:50 AM CST -----  Contact: Yoko with Dr. Salo Babcock called and stated she needed to speak to the nurse.  She needs instructions on the pt's medication before surgery. She can be reached at [phone].    Thanks,  Tf

## 2018-02-16 NOTE — TELEPHONE ENCOUNTER
I rtc and she notified me pt was confused about his meds; metoprolol and elaquis. Don't know if pt is taking or not. Also will need clearance from Dr Burgos and instructions on holding elaquis for EGD procedure. faisal

## 2018-02-22 ENCOUNTER — OFFICE VISIT (OUTPATIENT)
Dept: CARDIOLOGY | Facility: CLINIC | Age: 73
End: 2018-02-22
Payer: MEDICARE

## 2018-02-22 VITALS
WEIGHT: 294.75 LBS | DIASTOLIC BLOOD PRESSURE: 60 MMHG | HEART RATE: 68 BPM | HEIGHT: 71 IN | SYSTOLIC BLOOD PRESSURE: 118 MMHG | BODY MASS INDEX: 41.27 KG/M2

## 2018-02-22 DIAGNOSIS — I48.0 PAROXYSMAL ATRIAL FIBRILLATION: Primary | ICD-10-CM

## 2018-02-22 PROCEDURE — 99213 OFFICE O/P EST LOW 20 MIN: CPT | Mod: PBBFAC,PO | Performed by: INTERNAL MEDICINE

## 2018-02-22 PROCEDURE — 99214 OFFICE O/P EST MOD 30 MIN: CPT | Mod: S$PBB,,, | Performed by: INTERNAL MEDICINE

## 2018-02-22 PROCEDURE — 99999 PR PBB SHADOW E&M-EST. PATIENT-LVL III: CPT | Mod: PBBFAC,,, | Performed by: INTERNAL MEDICINE

## 2018-02-22 PROCEDURE — 1159F MED LIST DOCD IN RCRD: CPT | Mod: ,,, | Performed by: INTERNAL MEDICINE

## 2018-02-22 NOTE — PROGRESS NOTES
Subjective:   Patient ID:  Trip Prasad is a 72 y.o. male who presents for follow-up of Follow-up (questions about dx of afib at hospital stay, not sure if it was due to meds he was getting IV meds ; had flu and pneumonia )  Pt planned for clearance for EGD. Stress test is normal.    Atrial Fibrillation   Presents for follow-up visit. Symptoms are negative for bradycardia, chest pain, dizziness, hypotension, palpitations, shortness of breath, syncope, tachycardia and weakness. The symptoms have been stable. Past medical history includes atrial fibrillation. Medication compliance problems include medication side effects.       Review of Systems   Constitution: Negative. Negative for weakness and weight gain.   HENT: Negative.    Eyes: Negative.    Cardiovascular: Negative.  Negative for chest pain, leg swelling, palpitations and syncope.   Respiratory: Negative.  Negative for shortness of breath.    Endocrine: Negative.    Hematologic/Lymphatic: Negative.    Skin: Negative.    Musculoskeletal: Negative for muscle weakness.   Gastrointestinal: Negative.    Genitourinary: Negative.    Neurological: Negative.  Negative for dizziness.   Psychiatric/Behavioral: Negative.    Allergic/Immunologic: Negative.      Family History   Problem Relation Age of Onset    Cancer Maternal Aunt     Heart disease Maternal Aunt     Heart disease Maternal Grandfather     Cancer Maternal Uncle      Past Medical History:   Diagnosis Date    Diabetes mellitus, type 2 1/2014    Glaucoma 2012    Hiatal hernia     Hyperlipidemia     Loss of hearing     Hearing aids 6/12 bilateral    Obesity (BMI 30-39.9)      Current Outpatient Prescriptions on File Prior to Visit   Medication Sig Dispense Refill    apixaban 5 mg Tab Take 1 tablet (5 mg total) by mouth 2 (two) times daily. 60 tablet 1    aspirin (ECOTRIN) 81 MG EC tablet Take 81 mg by mouth once daily.      blood sugar diagnostic Strp 1 strip by Misc.(Non-Drug; Combo Route)  route once daily. 100 strip 3    brinzolamide-brimonidine 1-0.2 % DrpS Apply to eye.      metoprolol tartrate (LOPRESSOR) 25 MG tablet Take 1 tablet (25 mg total) by mouth 2 (two) times daily. 60 tablet 1    [DISCONTINUED] montelukast (SINGULAIR) 10 mg tablet Take 10 mg by mouth every evening.      [DISCONTINUED] moxifloxacin (AVELOX) 400 mg tablet Take 1 tablet (400 mg total) by mouth once daily. 7 tablet 0    [DISCONTINUED] pantoprazole (PROTONIX) 40 MG tablet Take 1 tablet (40 mg total) by mouth 2 (two) times daily before meals. 60 tablet 1    [DISCONTINUED] predniSONE (DELTASONE) 20 MG tablet Take 20 mg by mouth once daily.       No current facility-administered medications on file prior to visit.      Review of patient's allergies indicates:   Allergen Reactions    Biaxin [clarithromycin] Other (See Comments)     Pt unsure     Pcn [penicillins]     Metformin Rash     Rash over body        Objective:     Physical Exam   Constitutional: He is oriented to person, place, and time. He appears well-developed and well-nourished.   HENT:   Head: Normocephalic and atraumatic.   Eyes: Conjunctivae are normal. Pupils are equal, round, and reactive to light.   Neck: Normal range of motion. Neck supple.   Cardiovascular: Normal rate, regular rhythm, normal heart sounds and intact distal pulses.    Pulmonary/Chest: Effort normal and breath sounds normal.   Abdominal: Soft. Bowel sounds are normal.   Neurological: He is alert and oriented to person, place, and time.   Skin: Skin is warm and dry.   Psychiatric: He has a normal mood and affect.   Nursing note and vitals reviewed.      Assessment:   1. afib  2. HTN      Plan:     Pt cleared for EGD at moderate CV risk  Event monitor  Continue eliquis, metoprolol- afib  Can stop eliquis before procedure

## 2018-03-05 ENCOUNTER — TELEPHONE (OUTPATIENT)
Dept: CARDIOLOGY | Facility: CLINIC | Age: 73
End: 2018-03-05

## 2018-03-05 NOTE — TELEPHONE ENCOUNTER
I called and left a v/m asking Mr Prasad to rtc to Rosalba at [phone] in ref to lab results rec'd from Dr Leong. cm            Rec'd via fax today, labs done at [facility] on 03/02/18 for Lipid and liver panel. Rev'd with Dr Burgos: BEATRIZ 03/05/2018 3:pm  start lipitor 20 mg qday and repeat labs in 6-8 weeks.MARTHA Randhawa. cm

## 2018-03-06 DIAGNOSIS — E78.49 OTHER HYPERLIPIDEMIA: Primary | ICD-10-CM

## 2018-03-06 DIAGNOSIS — Z76.89 ENCOUNTER FOR NEW MEDICATION PRESCRIPTION: ICD-10-CM

## 2018-03-06 DIAGNOSIS — E78.49 OTHER HYPERLIPIDEMIA: ICD-10-CM

## 2018-03-06 DIAGNOSIS — Z76.89 ENCOUNTER FOR NEW MEDICATION PRESCRIPTION: Primary | ICD-10-CM

## 2018-03-09 ENCOUNTER — CLINICAL SUPPORT (OUTPATIENT)
Dept: CARDIOLOGY | Facility: CLINIC | Age: 73
End: 2018-03-09
Attending: INTERNAL MEDICINE
Payer: MEDICARE

## 2018-03-09 DIAGNOSIS — I48.0 PAROXYSMAL ATRIAL FIBRILLATION: ICD-10-CM

## 2018-03-09 PROCEDURE — 0298T HOLTER MONITOR - 3-14 DAY ADULT: CPT | Mod: ,,, | Performed by: NUCLEAR MEDICINE

## 2018-03-09 PROCEDURE — 0296T HOLTER MONITOR - 3-14 DAY ADULT: CPT | Mod: PBBFAC | Performed by: NUCLEAR MEDICINE

## 2018-03-13 ENCOUNTER — OFFICE VISIT (OUTPATIENT)
Dept: PULMONOLOGY | Facility: CLINIC | Age: 73
End: 2018-03-13
Payer: MEDICARE

## 2018-03-13 VITALS
SYSTOLIC BLOOD PRESSURE: 118 MMHG | OXYGEN SATURATION: 95 % | RESPIRATION RATE: 18 BRPM | WEIGHT: 297.81 LBS | HEART RATE: 86 BPM | DIASTOLIC BLOOD PRESSURE: 72 MMHG | BODY MASS INDEX: 42.63 KG/M2 | HEIGHT: 70 IN

## 2018-03-13 DIAGNOSIS — J43.9 MIXED RESTRICTIVE AND OBSTRUCTIVE LUNG DISEASE: Primary | ICD-10-CM

## 2018-03-13 DIAGNOSIS — R91.8 GROUND GLASS OPACITY PRESENT ON IMAGING OF LUNG: ICD-10-CM

## 2018-03-13 DIAGNOSIS — J98.6 ELEVATED DIAPHRAGM: ICD-10-CM

## 2018-03-13 DIAGNOSIS — J98.4 MIXED RESTRICTIVE AND OBSTRUCTIVE LUNG DISEASE: Primary | ICD-10-CM

## 2018-03-13 PROCEDURE — 99999 PR PBB SHADOW E&M-EST. PATIENT-LVL III: CPT | Mod: PBBFAC,,, | Performed by: INTERNAL MEDICINE

## 2018-03-13 PROCEDURE — 99214 OFFICE O/P EST MOD 30 MIN: CPT | Mod: S$PBB,,, | Performed by: INTERNAL MEDICINE

## 2018-03-13 PROCEDURE — 99213 OFFICE O/P EST LOW 20 MIN: CPT | Mod: PBBFAC | Performed by: INTERNAL MEDICINE

## 2018-03-13 RX ORDER — ALBUTEROL SULFATE 90 UG/1
2 AEROSOL, METERED RESPIRATORY (INHALATION) EVERY 6 HOURS PRN
Qty: 1 INHALER | Refills: 3 | Status: SHIPPED | OUTPATIENT
Start: 2018-03-13 | End: 2018-07-11

## 2018-03-13 NOTE — PROGRESS NOTES
"Subjective:       Patient ID: Trip Prasad is a 72 y.o. male.    Chief Complaint: Cough and Shortness of Breath    72 year old male   Was in MICU with Hypoxic respiratory failure, lung infiltrates  Mostly resolved  Illness precipitated after exposure to dry leaves: concern about mold  Hypersensitivity serologies were -ve  All resporatory symptoms have resolved except for occasional night time wheeze  Need eval for Histo  PFT show Mixed Moderate restriction  And obstruction and reduced DLCO  Discussed addition of NINA  Repeat CXr next visit  If dont resolve may need bronch    I have reviewed the patient's medical history in detail and updated the computerized patient record.          Review of Systems   Constitutional: Negative.    HENT: Negative.    Respiratory: Positive for wheezing and asthma nighttime symptoms.         Occasional night time wheeze   Cardiovascular: Negative.    Genitourinary: Negative.    Endocrine: endocrine negative   Musculoskeletal: Negative.    Skin: Negative.    Gastrointestinal: Negative.    Neurological: Negative.    Psychiatric/Behavioral: Positive for sleep disturbance.       Objective:       Vitals:    03/13/18 1004   BP: 118/72   Pulse: 86   Resp: 18   SpO2: 95%   Weight: 135.1 kg (297 lb 13.5 oz)   Height: 5' 10" (1.778 m)     Physical Exam   Constitutional: He is oriented to person, place, and time. He appears well-developed and well-nourished. He is obese.   HENT:   Head: Normocephalic.   Nose: Nose normal.   Neck: Normal range of motion. Neck supple. No tracheal deviation present. No thyromegaly present.   Cardiovascular: Normal rate, regular rhythm and normal heart sounds.    Pulmonary/Chest: Normal expansion, effort normal and breath sounds normal.   Abdominal: Soft. Bowel sounds are normal.   Musculoskeletal: Normal range of motion. He exhibits no edema.   Lymphadenopathy:     He has no cervical adenopathy.   Neurological: He is alert and oriented to person, place, and " time.   Skin: Skin is warm and dry.   Psychiatric: He has a normal mood and affect.   Nursing note and vitals reviewed.    Personal Diagnostic Review  SNIFF test  Findings: There is normal excursion of both hemidiaphragms with inspiration and expiration.  No paradoxical movement of the hemidiaphragm.    Pulmonary function tests: FEV1: 1.86  (56 % predicted), FVC:  2.78 (62 % predicted), FEV1/FVC:  67, T.46 (67 % predicted), RV/TLVC: 38 (93 % predicted), DLCO: 14.3 (50 % predicted)    6MWD  Mild exercise desaturation from 96% to 92%  Dyspnea was mild  Distance was 365.76% (79.81%)  Normal exercise capacity      No flowsheet data found.      Assessment:       Problem List Items Addressed This Visit     Elevated diaphragm      Other Visit Diagnoses     Mixed restrictive and obstructive lung disease    -  Primary    Relevant Medications    albuterol 90 mcg/actuation inhaler    Other Relevant Orders    X-Ray Chest PA And Lateral    Spirometry with/without bronchodilator    Ground glass opacity present on imaging of lung        Relevant Orders    FUNGAL IMMUNODIFFUSION - BLOOD    Histoplasma Antibody, Serum    Histoplasma antigen, urine        Plan:          Some improvement on GGO  Send specimen for Histo  Trial; of inhaler    Follow-up in about 3 months (around 2018) for Labs today, Spirometry and CXR next visit.    This note was prepared using voice recognition system and is likely to have sound alike errors that may have been overlooked even after proof reading.  Please call me with any questions    Discussed diagnosis, its evaluation, treatment and usual course. All questions answered.    Thank you for the courtesy of participating in the care of this patient    Taqueria Garcia MD

## 2018-03-15 ENCOUNTER — HOSPITAL ENCOUNTER (OUTPATIENT)
Dept: RADIOLOGY | Facility: HOSPITAL | Age: 73
Discharge: HOME | End: 2018-03-15
Attending: INTERNAL MEDICINE
Payer: MEDICARE

## 2018-03-15 DIAGNOSIS — J98.4 MIXED RESTRICTIVE AND OBSTRUCTIVE LUNG DISEASE: ICD-10-CM

## 2018-03-15 DIAGNOSIS — J43.9 MIXED RESTRICTIVE AND OBSTRUCTIVE LUNG DISEASE: ICD-10-CM

## 2018-03-20 ENCOUNTER — TELEPHONE (OUTPATIENT)
Dept: PULMONOLOGY | Facility: CLINIC | Age: 73
End: 2018-03-20

## 2018-03-20 NOTE — TELEPHONE ENCOUNTER
----- Message from Taqueria Garcia MD sent at 3/15/2018 10:57 AM CDT -----  Do when monitor off    Taqueria Garcia MD    ----- Message -----  From: Daniel Barrientos LPN  Sent: 3/15/2018  10:39 AM  To: Taqueria Garcia MD    Pt unable to have cxr . He has a heart monitor.

## 2018-03-27 ENCOUNTER — HOSPITAL ENCOUNTER (OUTPATIENT)
Dept: RADIOLOGY | Facility: HOSPITAL | Age: 73
Discharge: HOME | End: 2018-03-27
Attending: INTERNAL MEDICINE
Payer: MEDICARE

## 2018-03-27 PROCEDURE — 71046 X-RAY EXAM CHEST 2 VIEWS: CPT | Mod: TC

## 2018-03-27 PROCEDURE — 71046 X-RAY EXAM CHEST 2 VIEWS: CPT | Mod: 26,,, | Performed by: RADIOLOGY

## 2018-04-03 ENCOUNTER — TELEPHONE (OUTPATIENT)
Dept: CARDIOLOGY | Facility: CLINIC | Age: 73
End: 2018-04-03

## 2018-04-03 NOTE — TELEPHONE ENCOUNTER
----- Message from Jose Burgos MD sent at 4/3/2018  5:30 AM CDT -----  Event monitor with rare episodes of afib, continue eliquis and metorpolol

## 2018-04-03 NOTE — TELEPHONE ENCOUNTER
I rtc and spoke with pt and wife about test results and need to cont eliquis and metoprolol due to a-fib. They asked a lot of questions and I did my best to help them understand. Asked him to keep appt with Dr Burgos and he will answer questions. They agreed. Pt tells me Dr Leong has arranged GI scope and was told to hold eliquis am of scope and take after it is done. I asked did they request Cardiac clearance and do they know all his meds? He said no clearance asked and yes they know all his meds. cm

## 2018-04-17 ENCOUNTER — TELEPHONE (OUTPATIENT)
Dept: CARDIOLOGY | Facility: CLINIC | Age: 73
End: 2018-04-17

## 2018-04-17 NOTE — TELEPHONE ENCOUNTER
----- Message from July Long sent at 4/17/2018  2:37 PM CDT -----  Contact: [facility]/Alexa   States the pt is receiving PT with them and he is interested in having electrical stimulation done and since he has Afib they need to get clearance to do it. Please call Alexa at [phone]. Thank you

## 2018-04-18 NOTE — TELEPHONE ENCOUNTER
Alexa with [facility] notified ok to proceed, but if at any time he feels like his heart is racing or if he feels bad to remove immediately. faisal

## 2018-04-25 ENCOUNTER — OFFICE VISIT (OUTPATIENT)
Dept: CARDIOLOGY | Facility: CLINIC | Age: 73
End: 2018-04-25
Payer: MEDICARE

## 2018-04-25 VITALS
HEART RATE: 72 BPM | BODY MASS INDEX: 41.31 KG/M2 | SYSTOLIC BLOOD PRESSURE: 130 MMHG | WEIGHT: 288.56 LBS | DIASTOLIC BLOOD PRESSURE: 80 MMHG | HEIGHT: 70 IN

## 2018-04-25 DIAGNOSIS — E78.00 PURE HYPERCHOLESTEROLEMIA: Chronic | ICD-10-CM

## 2018-04-25 DIAGNOSIS — Z79.01 CHRONIC ANTICOAGULATION: ICD-10-CM

## 2018-04-25 DIAGNOSIS — I48.0 PAROXYSMAL ATRIAL FIBRILLATION: Primary | ICD-10-CM

## 2018-04-25 PROCEDURE — 99213 OFFICE O/P EST LOW 20 MIN: CPT | Mod: PBBFAC | Performed by: INTERNAL MEDICINE

## 2018-04-25 PROCEDURE — 99999 PR PBB SHADOW E&M-EST. PATIENT-LVL III: CPT | Mod: PBBFAC,,, | Performed by: INTERNAL MEDICINE

## 2018-04-25 PROCEDURE — 99215 OFFICE O/P EST HI 40 MIN: CPT | Mod: S$PBB,,, | Performed by: INTERNAL MEDICINE

## 2018-04-25 NOTE — PROGRESS NOTES
Subjective:   Patient ID:  Trip Prasad is a 72 y.o. male who presents for follow-up of Follow-up  Event monitor - a fib < 1%. Pt being evaluated for DM by PCP .Patient denies CP, angina or anginal equivalent.  Pt with hx of HBA1c > 7?  Atrial Fibrillation   Presents for follow-up visit. Symptoms include palpitations and tachycardia. Symptoms are negative for bradycardia, chest pain, dizziness, shortness of breath, syncope and weakness. The symptoms have been stable. Past medical history includes atrial fibrillation. Medication compliance problems include medication side effects.       Review of Systems   Constitution: Negative. Negative for weakness and weight gain.   HENT: Negative.    Eyes: Negative.    Cardiovascular: Positive for palpitations. Negative for chest pain, leg swelling and syncope.   Respiratory: Negative.  Negative for shortness of breath.    Endocrine: Negative.    Hematologic/Lymphatic: Negative.    Skin: Negative.    Musculoskeletal: Negative for muscle weakness.   Gastrointestinal: Negative.    Genitourinary: Negative.    Neurological: Negative.  Negative for dizziness.   Psychiatric/Behavioral: Negative.    Allergic/Immunologic: Negative.      Family History   Problem Relation Age of Onset    Cancer Maternal Aunt     Heart disease Maternal Aunt     Heart disease Maternal Grandfather     Cancer Maternal Uncle      Past Medical History:   Diagnosis Date    Diabetes mellitus, type 2 1/2014    Glaucoma 2012    Hiatal hernia     Hyperlipidemia     Loss of hearing     Hearing aids 6/12 bilateral    Obesity (BMI 30-39.9)      Current Outpatient Prescriptions on File Prior to Visit   Medication Sig Dispense Refill    apixaban 5 mg Tab Take 1 tablet (5 mg total) by mouth 2 (two) times daily. 60 tablet 1    aspirin (ECOTRIN) 81 MG EC tablet Take 81 mg by mouth once daily.      blood sugar diagnostic Strp 1 strip by Misc.(Non-Drug; Combo Route) route once daily. 100 strip 3     brinzolamide-brimonidine 1-0.2 % DrpS Apply to eye.      metoprolol tartrate (LOPRESSOR) 25 MG tablet Take 1 tablet (25 mg total) by mouth 2 (two) times daily. 60 tablet 1    ONETOUCH DELICA LANCETS 33 gauge Misc USE TO TEST BLOOD SUGAR TWICE DAILY  11    ONETOUCH VERIO SYSTEM Misc USE TO TEST BLOOD SUGAR TWICE DAILY  11    albuterol 90 mcg/actuation inhaler Inhale 2 puffs into the lungs every 6 (six) hours as needed for Wheezing. Please call patient to pick prescription once it is ready. 1 Inhaler 3     No current facility-administered medications on file prior to visit.      Review of patient's allergies indicates:   Allergen Reactions    Biaxin [clarithromycin] Other (See Comments)     Pt unsure     Pcn [penicillins]     Metformin Rash     Rash over body        Objective:     Physical Exam   Constitutional: He is oriented to person, place, and time. He appears well-developed and well-nourished.   HENT:   Head: Normocephalic and atraumatic.   Eyes: Conjunctivae are normal. Pupils are equal, round, and reactive to light.   Neck: Normal range of motion. Neck supple.   Cardiovascular: Normal rate, normal heart sounds and intact distal pulses.  An irregularly irregular rhythm present.   Pulmonary/Chest: Effort normal and breath sounds normal.   Abdominal: Soft. Bowel sounds are normal.   Neurological: He is alert and oriented to person, place, and time.   Skin: Skin is warm and dry.   Psychiatric: He has a normal mood and affect.   Nursing note and vitals reviewed.      Assessment:     1. Paroxysmal atrial fibrillation    2. Pure hypercholesterolemia    3. Chronic anticoagulation    4. ? Pre- diabetes    Plan:     Paroxysmal atrial fibrillation    Pure hypercholesterolemia    Chronic anticoagulation    Continue eliquis, metoprolol- afib  EP consult

## 2018-05-17 ENCOUNTER — INITIAL CONSULT (OUTPATIENT)
Dept: CARDIOLOGY | Facility: CLINIC | Age: 73
End: 2018-05-17
Payer: MEDICARE

## 2018-05-17 VITALS
DIASTOLIC BLOOD PRESSURE: 68 MMHG | BODY MASS INDEX: 41.37 KG/M2 | HEART RATE: 70 BPM | HEIGHT: 70 IN | WEIGHT: 289 LBS | SYSTOLIC BLOOD PRESSURE: 118 MMHG

## 2018-05-17 DIAGNOSIS — I48.0 PAROXYSMAL ATRIAL FIBRILLATION: Primary | ICD-10-CM

## 2018-05-17 PROCEDURE — 93005 ELECTROCARDIOGRAM TRACING: CPT | Mod: PBBFAC | Performed by: NUCLEAR MEDICINE

## 2018-05-17 PROCEDURE — 99999 PR PBB SHADOW E&M-EST. PATIENT-LVL III: CPT | Mod: PBBFAC,,, | Performed by: INTERNAL MEDICINE

## 2018-05-17 PROCEDURE — 93010 ELECTROCARDIOGRAM REPORT: CPT | Mod: S$PBB,,, | Performed by: NUCLEAR MEDICINE

## 2018-05-17 PROCEDURE — 99213 OFFICE O/P EST LOW 20 MIN: CPT | Mod: PBBFAC | Performed by: INTERNAL MEDICINE

## 2018-05-17 PROCEDURE — 99205 OFFICE O/P NEW HI 60 MIN: CPT | Mod: S$PBB,,, | Performed by: INTERNAL MEDICINE

## 2018-05-17 RX ORDER — PANTOPRAZOLE SODIUM 40 MG/1
40 TABLET, DELAYED RELEASE ORAL DAILY
Refills: 1 | COMMUNITY
Start: 2018-04-25 | End: 2018-07-11

## 2018-05-17 NOTE — LETTER
May 17, 2018      Jose Burgos MD  [address]  [place] [address]           [facility]  [address] [place] [address] , [address]  [place] [address]  Phone: [phone]  Fax: [phone]          Patient: Trip Prasad   MR Number: [id number]   YOB: 1945   Date of Visit: 5/17/2018       Dear Dr. Jose Burgos:    Thank you for referring Trip Prasad to me for evaluation. Attached you will find relevant portions of my assessment and plan of care.    If you have questions, please do not hesitate to call me. I look forward to following Trip Prasad along with you.    Sincerely,    Talha Vick MD    Enclosure  CC:  No Recipients    If you would like to receive this communication electronically, please contact [email or web address] or [phone] to request more information on [facility] access.    For providers and/or their staff who would like to refer a patient to [facility], please contact us through our one-stop-shop provider referral line, [facility], [place], at [phone].    If you feel you have received this communication in error or would no longer like to receive these types of communications, please e-mail [email or web address]

## 2018-05-17 NOTE — PROGRESS NOTES
Subjective:    Patient ID:  Trip Prasad is a 72 y.o. male who presents for evaluation of Atrial Fibrillation      72 yoM here for AF management. He suffered influenza and pneumonia 1/18. During his hospitalization he had AF documented on 1/15/18. He was started on apixaban as well as metoprolol. No recurrent symptomatic AF. Recent Zio patch, 4/18, revealed <1% AF burden. No history of TIA/CVA, PVD, CHF. No syncope or near syncope. Nl EF, nl ischemia on stress.     Echo 1/18:  CONCLUSIONS     1 - Concentric remodeling.     2 - No wall motion abnormalities.     3 - Normal left ventricular systolic function (EF 60-65%).     4 - Normal left ventricular diastolic function.     5 - Normal right ventricular systolic function .     NM Stress 2/18:    Nuclear Quantitative Functional Analysis:   LVEF: 57 %    Impression: NORMAL MYOCARDIAL PERFUSION  1. The perfusion scan is free of evidence for myocardial ischemia or injury.   2. Resting wall motion is physiologic.   3. Resting LV function is normal.   4. The ventricular volumes are normal at rest and stress.   5. The extracardiac distribution of radioactivity is normal.     Past Medical History:  1/2014: Diabetes mellitus, type 2  2012: Glaucoma  No date: Hiatal hernia  No date: Hyperlipidemia  No date: Loss of hearing      Comment: Hearing aids 6/12 bilateral  No date: Obesity (BMI 30-39.9)    Past Surgical History:  2013: EYE SURGERY Bilateral      Comment: Cataract  10/12: GLAUCOMA SURGERY  3/04: ROTATOR CUFF REPAIR Right  No date: TONSILLECTOMY  No date: TOTAL KNEE ARTHROPLASTY Left  11/19/15: Total right knee replacement Right  No date: WISDOM TOOTH EXTRACTION    Social History    Marital status:              Spouse name:                       Years of education:                 Number of children: 1             Occupational History  Occupation          Employer            Comment               [employer] started 1990        "                                    for    Social History Main Topics    Smoking status: Never Smoker                                                                Smokeless tobacco: Never Used                        Alcohol use: Yes           0.0 oz/week       Comment: " very little"    Drug use: No              Sexual activity: Yes               Partners with: Female    Other Topics            Concern    None on file    Social History Narrative     3 kids, and     Review of patient's family history indicates:  Problem: Cancer      Relation: Maternal Aunt       Age of Onset: (Not Specified)   Problem: Heart disease      Relation: Maternal Aunt       Age of Onset: (Not Specified)   Problem: Heart disease      Relation: Maternal Grandfather       Age of Onset: (Not Specified)   Problem: Cancer      Relation: Maternal Uncle       Age of Onset: (Not Specified)           Review of Systems   Constitution: Negative.   HENT: Negative.    Eyes: Negative.    Cardiovascular: Negative for chest pain, dyspnea on exertion, leg swelling, near-syncope, palpitations and syncope.   Respiratory: Negative.  Negative for shortness of breath.    Endocrine: Negative.    Hematologic/Lymphatic: Negative.    Skin: Negative.    Musculoskeletal: Negative.    Gastrointestinal: Negative.    Genitourinary: Negative.    Neurological: Negative.  Negative for dizziness and light-headedness.   Psychiatric/Behavioral: Negative.    Allergic/Immunologic: Negative.         Objective:    Physical Exam   Constitutional: He is oriented to person, place, and time. He appears well-developed and well-nourished. No distress.   HENT:   Head: Normocephalic and atraumatic.   Eyes: EOM are normal. Pupils are equal, round, and reactive to light.   Neck: Normal range of motion. No JVD present. No thyromegaly present.   Cardiovascular: Normal rate, regular rhythm, S1 normal, S2 normal and normal heart sounds.  PMI is not displaced.  Exam reveals no " gallop and no friction rub.    No murmur heard.  Pulmonary/Chest: Effort normal and breath sounds normal. No respiratory distress. He has no wheezes. He has no rales.   Abdominal: Soft. Bowel sounds are normal. He exhibits no distension. There is no tenderness. There is no rebound and no guarding.   Musculoskeletal: Normal range of motion. He exhibits no edema or tenderness.   Neurological: He is alert and oriented to person, place, and time. No cranial nerve deficit.   Skin: Skin is warm and dry. No rash noted. No erythema.   Psychiatric: He has a normal mood and affect. His behavior is normal. Judgment and thought content normal.   Vitals reviewed.    ECG: NSR nl MT, QRS, QTc        Assessment:       1. Paroxysmal atrial fibrillation         Plan:       72 yoM pAF, HTN, DM here for AF management. I discussed AF and its basic pathophysiology, including its health implications and treatment options with the patient. Specifically, I addressed the need for CVA prophylaxis as well as the goal to reduce symptomatic arrhythmic episodes by pharmacologic and/or procedural methods. He is on appropriate CVA prophylaxis with apixaban. He is asymptomatic in AF. Will continue to monitor. RTC 6m

## 2018-05-22 RX ORDER — METOPROLOL TARTRATE 25 MG/1
25 TABLET, FILM COATED ORAL 2 TIMES DAILY
Qty: 60 TABLET | Refills: 3 | Status: SHIPPED | OUTPATIENT
Start: 2018-05-22 | End: 2018-07-21 | Stop reason: SDUPTHER

## 2018-05-22 NOTE — TELEPHONE ENCOUNTER
----- Message from Rachel Irvin sent at 5/22/2018  8:36 AM CDT -----  Pt is out of eliquis 5mg and metoprolol tartrate 25mg. Pharmacy has been sending over request. [facility] [phone]

## 2018-05-28 ENCOUNTER — TELEPHONE (OUTPATIENT)
Dept: PULMONOLOGY | Facility: CLINIC | Age: 73
End: 2018-05-28

## 2018-06-13 ENCOUNTER — PROCEDURE VISIT (OUTPATIENT)
Dept: PULMONOLOGY | Facility: CLINIC | Age: 73
End: 2018-06-13
Payer: MEDICARE

## 2018-06-13 DIAGNOSIS — R06.02 SOB (SHORTNESS OF BREATH): Primary | ICD-10-CM

## 2018-06-13 DIAGNOSIS — J98.4 MIXED RESTRICTIVE AND OBSTRUCTIVE LUNG DISEASE: ICD-10-CM

## 2018-06-13 DIAGNOSIS — J43.9 MIXED RESTRICTIVE AND OBSTRUCTIVE LUNG DISEASE: ICD-10-CM

## 2018-06-13 PROCEDURE — 94060 EVALUATION OF WHEEZING: CPT | Mod: PBBFAC

## 2018-06-13 PROCEDURE — 94060 EVALUATION OF WHEEZING: CPT | Mod: 26,S$PBB,, | Performed by: INTERNAL MEDICINE

## 2018-06-23 LAB
BRPFT: ABNORMAL
FEF 25 75 CHG: -4.3 %
FEF 25 75 LLN: 1.05
FEF 25 75 POST REF: 89.6 %
FEF 25 75 POST: 2.19 L/S (ref 1.05–3.83)
FEF 25 75 PRE REF: 93.7 %
FEF 25 75 PRE: 2.29 L/S (ref 1.05–3.83)
FEF 25 75 REF: 2.44
FET100 CHG: 6.2 %
FET100 POST: 10.07 SEC
FET100 PRE: 9.49 SEC
FEV1 CHG: -2.9 %
FEV1 FVC CHG: -0.6 %
FEV1 FVC LLN: 62
FEV1 FVC POST REF: 103 %
FEV1 FVC POST: 78.09 % (ref 62.33–89.27)
FEV1 FVC PRE REF: 103.6 %
FEV1 FVC PRE: 78.53 % (ref 62.33–89.27)
FEV1 FVC REF: 76
FEV1 LLN: 2.33
FEV1 POST REF: 71.3 %
FEV1 POST: 2.31 L (ref 2.33–4.14)
FEV1 PRE REF: 73.4 %
FEV1 PRE: 2.38 L (ref 2.33–4.14)
FEV1 REF: 3.24
FEV6 CHG: -3.2 %
FEV6 LLN: 3.34
FEV6 POST REF: 66.6 %
FEV6 POST: 2.83 L (ref 3.34–5.17)
FEV6 PRE REF: 68.8 %
FEV6 PRE: 2.93 L (ref 3.34–5.17)
FEV6 REF: 4.26
FVC CHG: -2.3 %
FVC LLN: 3.18
FVC POST REF: 68.9 %
FVC POST: 2.96 L (ref 3.18–5.41)
FVC PRE REF: 70.5 %
FVC PRE: 3.03 L (ref 3.18–5.41)
FVC REF: 4.29
MVV LLN: 108
MVV REF: 127
PEF CHG: 7.2 %
PEF LLN: 6.12
PEF POST REF: 67.9 %
PEF POST: 5.75 L/S (ref 6.12–10.81)
PEF PRE REF: 63.4 %
PEF PRE: 5.36 L/S (ref 6.12–10.81)
PEF REF: 8.47

## 2018-07-11 ENCOUNTER — OFFICE VISIT (OUTPATIENT)
Dept: PULMONOLOGY | Facility: CLINIC | Age: 73
End: 2018-07-11
Attending: INTERNAL MEDICINE
Payer: MEDICARE

## 2018-07-11 ENCOUNTER — HOSPITAL ENCOUNTER (OUTPATIENT)
Dept: RADIOLOGY | Facility: HOSPITAL | Age: 73
Discharge: HOME | End: 2018-07-11
Attending: INTERNAL MEDICINE
Payer: MEDICARE

## 2018-07-11 VITALS
BODY MASS INDEX: 40.35 KG/M2 | HEIGHT: 70 IN | OXYGEN SATURATION: 98 % | SYSTOLIC BLOOD PRESSURE: 135 MMHG | RESPIRATION RATE: 16 BRPM | HEART RATE: 79 BPM | WEIGHT: 281.88 LBS | DIASTOLIC BLOOD PRESSURE: 70 MMHG

## 2018-07-11 DIAGNOSIS — R06.02 SOB (SHORTNESS OF BREATH): ICD-10-CM

## 2018-07-11 DIAGNOSIS — J98.6 ELEVATED DIAPHRAGM: ICD-10-CM

## 2018-07-11 DIAGNOSIS — R94.2 RESTRICTIVE VENTILATORY DEFECT: Primary | ICD-10-CM

## 2018-07-11 DIAGNOSIS — R06.02 SOB (SHORTNESS OF BREATH): Primary | ICD-10-CM

## 2018-07-11 PROBLEM — J06.9 UPPER RESPIRATORY TRACT INFECTION: Status: RESOLVED | Noted: 2017-04-25 | Resolved: 2018-07-11

## 2018-07-11 PROBLEM — R06.2 WHEEZING WITHOUT DIAGNOSIS OF ASTHMA: Status: RESOLVED | Noted: 2018-01-11 | Resolved: 2018-07-11

## 2018-07-11 PROCEDURE — 71046 X-RAY EXAM CHEST 2 VIEWS: CPT | Mod: 26,,, | Performed by: RADIOLOGY

## 2018-07-11 PROCEDURE — 99999 PR PBB SHADOW E&M-EST. PATIENT-LVL III: CPT | Mod: PBBFAC,,, | Performed by: INTERNAL MEDICINE

## 2018-07-11 PROCEDURE — 99213 OFFICE O/P EST LOW 20 MIN: CPT | Mod: PBBFAC,25 | Performed by: INTERNAL MEDICINE

## 2018-07-11 PROCEDURE — 71046 X-RAY EXAM CHEST 2 VIEWS: CPT | Mod: TC

## 2018-07-11 PROCEDURE — 99214 OFFICE O/P EST MOD 30 MIN: CPT | Mod: S$PBB,,, | Performed by: INTERNAL MEDICINE

## 2018-07-11 NOTE — ASSESSMENT & PLAN NOTE
There is normal excursion of both hemidiaphragms with inspiration and expiration.  No paradoxical movement of the hemidiaphragm

## 2018-07-11 NOTE — PROGRESS NOTES
"Subjective:       Patient ID: Trip Prasad is a 72 y.o. male.    Chief Complaint: Mixed restrictive and obstructive lung disease    72 year old male , Follow up visit since 03/13/2018  No respiratory symptoms: No cough, No wheezing  Was in MICU with Hypoxic respiratory failure, lung infiltrates  Mostly resolved  Illness precipitated after exposure to dry leaves: concern about mold  Hypersensitivity serologies were -ve  All resporatory symptoms have resolved   PFT show Mixed Moderate restriction  And obstruction and reduced DLCO  Repeat CXr next visit  I have reviewed the patient's medical history in detail and updated the computerized patient record.          Review of Systems   Constitutional: Negative.    HENT: Negative.    Respiratory: Negative for snoring, shortness of breath, wheezing and asthma nighttime symptoms.             Cardiovascular: Negative.    Genitourinary: Negative.    Endocrine: endocrine negative   Musculoskeletal: Negative.    Skin: Negative.    Gastrointestinal: Negative.    Neurological: Negative.    Psychiatric/Behavioral: Negative for sleep disturbance.       Objective:       Vitals:    07/11/18 1027   BP: 135/70   Pulse: 79   Resp: 16   SpO2: 98%   Weight: 127.9 kg (281 lb 13.7 oz)   Height: 5' 10" (1.778 m)     Physical Exam   Constitutional: He is oriented to person, place, and time. He appears well-developed and well-nourished. He is obese.   HENT:   Head: Normocephalic.   Nose: Nose normal.   Neck: Normal range of motion. Neck supple. No tracheal deviation present. No thyromegaly present.   Cardiovascular: Normal rate, regular rhythm and normal heart sounds.    Pulmonary/Chest: Normal expansion, effort normal and breath sounds normal.   Abdominal: Soft. Bowel sounds are normal.   Musculoskeletal: Normal range of motion. He exhibits no edema.   Lymphadenopathy:     He has no cervical adenopathy.   Neurological: He is alert and oriented to person, place, and time.   Skin: Skin is " warm and dry.   Psychiatric: He has a normal mood and affect.   Nursing note and vitals reviewed.    Personal Diagnostic Review  SNIFF test  Findings: There is normal excursion of both hemidiaphragms with inspiration and expiration.  No paradoxical movement of the hemidiaphragm.    Pulmonary function tests: FEV1: 2.38  (73.4 % predicted), FVC:  3.03 (70.5 % predicted), FEV1/FVC:  79,      6MWD  Mild exercise desaturation from 96% to 92%  Dyspnea was mild  Distance was 365.76% (79.81%)  Normal exercise capacity    CXR done today reviewed: Clear    No flowsheet data found.      Assessment:       Problem List Items Addressed This Visit     BMI 40.0-44.9, adult     General weight loss/lifestyle modification strategies discussed (elicit support from others; identify saboteurs; non-food rewards).  Diet interventions: low calorie (1000 kCal/d) deficit diet    Wt dropped from 297Lb to 281 Lb           Elevated diaphragm     There is normal excursion of both hemidiaphragms with inspiration and expiration.  No paradoxical movement of the hemidiaphragm         Restrictive ventilatory defect - Primary     FV1 2.38L( 73.45), FVC 3.03( 70.5%), FEV1/FVC 79           Other Visit Diagnoses     SOB (shortness of breath)            Plan:        Histo serologies were -ve  Follow up CXR    Follow-up in about 1 year (around 7/11/2019), or if symptoms worsen or fail to improve, for Spirometry and CXR next visit: CXR TODAY.    This note was prepared using voice recognition system and is likely to have sound alike errors that may have been overlooked even after proof reading.  Please call me with any questions    Discussed diagnosis, its evaluation, treatment and usual course. All questions answered.    Thank you for the courtesy of participating in the care of this patient    Taqueria Garcia MD

## 2018-07-11 NOTE — ASSESSMENT & PLAN NOTE
General weight loss/lifestyle modification strategies discussed (elicit support from others; identify saboteurs; non-food rewards).  Diet interventions: low calorie (1000 kCal/d) deficit diet    Wt dropped from 297Lb to 281 Lb

## 2018-07-20 NOTE — TELEPHONE ENCOUNTER
----- Message from Marily Parry sent at 7/20/2018  9:28 AM CDT -----  Contact: pt  He's calling to get refills, he stated that he will take his last today....    1. What is the name of the medication you are requesting? 1. Eloqist 2.Metoprolol tartrate   2. What is the dose? 1. 5 mg 2. 25 mg  3. How do you take the medication? Orally, topically, etc? orally  4. How often do you take this medication? Twice daily  5. Do you need a 30 day or 90 day supply? 30  6. How many refills are you requesting? 1  7. What is your preferred pharmacy and location of the pharmacy? [facility]  8. Who can we contact with further questions? [phone]

## 2018-07-21 ENCOUNTER — TELEPHONE (OUTPATIENT)
Dept: CARDIOLOGY | Facility: HOSPITAL | Age: 73
End: 2018-07-21

## 2018-07-21 DIAGNOSIS — I48.0 PAROXYSMAL ATRIAL FIBRILLATION: Primary | ICD-10-CM

## 2018-07-21 RX ORDER — METOPROLOL TARTRATE 25 MG/1
25 TABLET, FILM COATED ORAL 2 TIMES DAILY
Qty: 60 TABLET | Refills: 11 | Status: SHIPPED | OUTPATIENT
Start: 2018-07-21 | End: 2018-07-23 | Stop reason: SDUPTHER

## 2018-07-21 RX ORDER — METOPROLOL TARTRATE 25 MG/1
25 TABLET, FILM COATED ORAL 2 TIMES DAILY
Qty: 60 TABLET | Refills: 2 | Status: SHIPPED | OUTPATIENT
Start: 2018-07-21 | End: 2018-07-21 | Stop reason: SDUPTHER

## 2018-07-23 ENCOUNTER — TELEPHONE (OUTPATIENT)
Dept: CARDIOLOGY | Facility: CLINIC | Age: 73
End: 2018-07-23

## 2018-07-23 RX ORDER — METOPROLOL TARTRATE 25 MG/1
25 TABLET, FILM COATED ORAL 2 TIMES DAILY
Qty: 60 TABLET | Refills: 11 | Status: SHIPPED | OUTPATIENT
Start: 2018-07-23 | End: 2018-08-29 | Stop reason: SDUPTHER

## 2018-07-23 NOTE — TELEPHONE ENCOUNTER
----- Message from Lisa Irvin sent at 7/23/2018 12:44 PM CDT -----  Contact: Patient   1. What is the name of the medication you are requesting? Rx Eliquis  2. What is the dose? 5 mg  3. How do you take the medication? Orally, topically, etc? oral  4. How often do you take this medication? Twice a day   5. Do you need a 30 day or 90 day supply? 30 days  6. How many refills are you requesting? 3  7. What is your preferred pharmacy and location of the pharmacy?   [facility] [address] [address]  Phone: [phone] Fax: [phone]  8. Who can we contact with further questions? Patient/[phone]    1. What is the name of the medication you are requesting? Rx Metoprolol  2. What is the dose? 25 mg  3. How do you take the medication? Orally, topically, etc? oral  4. How often do you take this medication? Twice a day  5. Do you need a 30 day or 90 day supply? 30 days  6. How many refills are you requesting? 3  7. What is your preferred pharmacy and location of the pharmacy?   [facility] [facility] [place] [id number] [address] [address]  Phone: [phone] Fax: [phone]  8. Who can we contact with further questions? Patient/[phone]

## 2018-07-23 NOTE — TELEPHONE ENCOUNTER
----- Message from Lisa Irvin sent at 7/23/2018 12:44 PM CDT -----  Contact: Patient   1. What is the name of the medication you are requesting? Rx Eliquis  2. What is the dose? 5 mg  3. How do you take the medication? Orally, topically, etc? oral  4. How often do you take this medication? Twice a day   5. Do you need a 30 day or 90 day supply? 30 days  6. How many refills are you requesting? 3  7. What is your preferred pharmacy and location of the pharmacy?   [facility] [address] [address]  Phone: [phone] Fax: [phone]  8. Who can we contact with further questions? Patient/[phone]    1. What is the name of the medication you are requesting? Rx Metoprolol  2. What is the dose? 25 mg  3. How do you take the medication? Orally, topically, etc? oral  4. How often do you take this medication? Twice a day  5. Do you need a 30 day or 90 day supply? 30 days  6. How many refills are you requesting? 3  7. What is your preferred pharmacy and location of the pharmacy?   [facility] [place] [id number] [address]  Phone: [phone] Fax: [phone]  8. Who can we contact with further questions? Patient/[phone]

## 2018-07-23 NOTE — TELEPHONE ENCOUNTER
----- Message from Lisa Irvin sent at 7/23/2018 12:44 PM CDT -----  Contact: Patient   1. What is the name of the medication you are requesting? Rx Eliquis  2. What is the dose? 5 mg  3. How do you take the medication? Orally, topically, etc? oral  4. How often do you take this medication? Twice a day   5. Do you need a 30 day or 90 day supply? 30 days  6. How many refills are you requesting? 3  7. What is your preferred pharmacy and location of the pharmacy?   [facility] [place] [address] [address]  Phone: [phone] Fax: [phone]  8. Who can we contact with further questions? Patient/[phone]    1. What is the name of the medication you are requesting? Rx Metoprolol  2. What is the dose? 25 mg  3. How do you take the medication? Orally, topically, etc? oral  4. How often do you take this medication? Twice a day  5. Do you need a 30 day or 90 day supply? 30 days  6. How many refills are you requesting? 3  7. What is your preferred pharmacy and location of the pharmacy?   [facility] [place] [id number] [address] [address]  Phone: [phone] Fax: [phone]  8. Who can we contact with further questions? Patient/[phone]

## 2018-07-23 NOTE — TELEPHONE ENCOUNTER
Called pt and notified  him rx were done on 07/21/18 x 2. Once by Dr Weir and later by Dr Burgos. He tells me the pharmacy said they never rec'd. Told him I would print the confirmation and fax it to the pharmacy. faisal

## 2018-08-29 ENCOUNTER — OFFICE VISIT (OUTPATIENT)
Dept: CARDIOLOGY | Facility: CLINIC | Age: 73
End: 2018-08-29
Payer: MEDICARE

## 2018-08-29 VITALS
SYSTOLIC BLOOD PRESSURE: 118 MMHG | BODY MASS INDEX: 40.27 KG/M2 | DIASTOLIC BLOOD PRESSURE: 72 MMHG | WEIGHT: 281.31 LBS | HEART RATE: 80 BPM | HEIGHT: 70 IN

## 2018-08-29 DIAGNOSIS — E78.00 PURE HYPERCHOLESTEROLEMIA: Chronic | ICD-10-CM

## 2018-08-29 DIAGNOSIS — I48.0 PAROXYSMAL ATRIAL FIBRILLATION: Primary | ICD-10-CM

## 2018-08-29 DIAGNOSIS — Z79.01 CHRONIC ANTICOAGULATION: ICD-10-CM

## 2018-08-29 PROCEDURE — 99214 OFFICE O/P EST MOD 30 MIN: CPT | Mod: S$PBB,,, | Performed by: INTERNAL MEDICINE

## 2018-08-29 PROCEDURE — 99999 PR PBB SHADOW E&M-EST. PATIENT-LVL III: CPT | Mod: PBBFAC,,, | Performed by: INTERNAL MEDICINE

## 2018-08-29 PROCEDURE — 99213 OFFICE O/P EST LOW 20 MIN: CPT | Mod: PBBFAC | Performed by: INTERNAL MEDICINE

## 2018-08-29 RX ORDER — MONTELUKAST SODIUM 10 MG/1
10 TABLET ORAL NIGHTLY
COMMUNITY

## 2018-08-29 RX ORDER — METOPROLOL TARTRATE 25 MG/1
25 TABLET, FILM COATED ORAL 2 TIMES DAILY
Qty: 60 TABLET | Refills: 11 | Status: SHIPPED | OUTPATIENT
Start: 2018-08-29 | End: 2019-09-10 | Stop reason: SDUPTHER

## 2018-08-29 RX ORDER — LORATADINE 10 MG/1
10 TABLET ORAL DAILY PRN
COMMUNITY

## 2018-08-29 NOTE — PROGRESS NOTES
Subjective:   Patient ID:  Trip Prasad is a 72 y.o. male who presents for follow-up of Follow-up  Patient denies CP, angina or anginal equivalent.  Pt tolerating meds .  Atrial Fibrillation   Presents for follow-up visit. Symptoms are negative for bradycardia, chest pain, dizziness, hypotension, palpitations, shortness of breath, syncope, tachycardia and weakness. The symptoms have been stable. Past medical history includes atrial fibrillation. There are no medication compliance problems.       Review of Systems   Constitution: Negative. Negative for weakness and weight gain.   HENT: Negative.    Eyes: Negative.    Cardiovascular: Negative.  Negative for chest pain, leg swelling, palpitations and syncope.   Respiratory: Negative.  Negative for shortness of breath.    Endocrine: Negative.    Hematologic/Lymphatic: Negative.    Skin: Negative.    Musculoskeletal: Negative for muscle weakness.   Gastrointestinal: Negative.    Genitourinary: Negative.    Neurological: Negative.  Negative for dizziness.   Psychiatric/Behavioral: Negative.    Allergic/Immunologic: Negative.      Family History   Problem Relation Age of Onset    Cancer Maternal Aunt     Heart disease Maternal Aunt     Heart disease Maternal Grandfather     Cancer Maternal Uncle      Past Medical History:   Diagnosis Date    Diabetes mellitus, type 2 1/2014    Glaucoma 2012    Hiatal hernia     Hyperlipidemia     Loss of hearing     Hearing aids 6/12 bilateral    Obesity (BMI 30-39.9)      Current Outpatient Medications on File Prior to Visit   Medication Sig Dispense Refill    apixaban 5 mg Tab Take 1 tablet (5 mg total) by mouth 2 (two) times daily. 60 tablet 3    azelastine HCl (AZELASTINE OPHT) Apply 1 drop to eye 2 (two) times daily.      blood sugar diagnostic Strp 1 strip by Misc.(Non-Drug; Combo Route) route once daily. 100 strip 3    brinzolamide-brimonidine 1-0.2 % DrpS Apply to eye.      loratadine (CLARITIN) 10 mg tablet Take  10 mg by mouth once daily.      metoprolol tartrate (LOPRESSOR) 25 MG tablet Take 1 tablet (25 mg total) by mouth 2 (two) times daily. 60 tablet 11    montelukast (SINGULAIR) 10 mg tablet Take 10 mg by mouth every evening.       No current facility-administered medications on file prior to visit.      Review of patient's allergies indicates:   Allergen Reactions    Biaxin [clarithromycin] Other (See Comments)     Pt unsure     Pcn [penicillins]     Metformin Rash     Rash over body        Objective:     Physical Exam   Constitutional: He is oriented to person, place, and time. He appears well-developed and well-nourished.   HENT:   Head: Normocephalic and atraumatic.   Eyes: Conjunctivae are normal. Pupils are equal, round, and reactive to light.   Neck: Normal range of motion. Neck supple.   Cardiovascular: Normal rate, regular rhythm, normal heart sounds and intact distal pulses.   Pulmonary/Chest: Effort normal and breath sounds normal.   Abdominal: Soft. Bowel sounds are normal.   Neurological: He is alert and oriented to person, place, and time.   Skin: Skin is warm and dry.   Psychiatric: He has a normal mood and affect.   Nursing note and vitals reviewed.      Assessment:     1. Paroxysmal atrial fibrillation    2. Chronic anticoagulation    3. Pure hypercholesterolemia        Plan:     Paroxysmal atrial fibrillation    Chronic anticoagulation    Pure hypercholesterolemia      Continue metoprolol, eliquis- afib

## 2018-09-18 ENCOUNTER — TELEPHONE (OUTPATIENT)
Dept: CARDIOLOGY | Facility: CLINIC | Age: 73
End: 2018-09-18

## 2018-09-18 DIAGNOSIS — I48.0 PAROXYSMAL ATRIAL FIBRILLATION: Primary | ICD-10-CM

## 2018-09-18 NOTE — TELEPHONE ENCOUNTER
----- Message from Rani Ramires sent at 9/18/2018 11:09 AM CDT -----  Contact: pt  Pt missed a call yesterday.  Pt thought it could be concerning his medication.  Please call pt at [phone]

## 2018-09-18 NOTE — TELEPHONE ENCOUNTER
I called pt and after discussion, he needs assistance with his eliquis rx due to cost. Order placed and sent. Pt to call for short supply from [facility] till this is processed. faisal

## 2018-09-19 ENCOUNTER — TELEPHONE (OUTPATIENT)
Dept: PHARMACY | Facility: CLINIC | Age: 73
End: 2018-09-19

## 2018-09-19 NOTE — TELEPHONE ENCOUNTER
Spoke with patient about referral for Eliquis.  The patient is currently in the donut [facility]. I am mailing out the paperwork today.

## 2018-11-09 ENCOUNTER — TELEPHONE (OUTPATIENT)
Dept: CARDIOLOGY | Facility: CLINIC | Age: 73
End: 2018-11-09

## 2018-11-09 NOTE — TELEPHONE ENCOUNTER
11/09 Per BEATRIZ Burgos if pt has not had a stroke he may hold the eliquis. LEANDRO Kaur LPN 11/09/18 4:04pm I called and spoke with pt. He denies every having a stroke. Told him Dr Burgos said it was ok to hold the eliquis and Dr Lee will tell him when he can retart. I called and notified Yesi with Dr Taylor's office. [phone].  ----- Message from Ike Olguin sent at 11/9/2018  2:10 PM CST -----  Contact: Yesi ( Dr. Carlin Lee)   Pt is having surgery on 11/12 to excise melanoma. Nurse wants to make sure can take pt of elaquist until then. pls return call.            [phone]

## 2018-12-10 ENCOUNTER — OFFICE VISIT (OUTPATIENT)
Dept: CARDIOLOGY | Facility: CLINIC | Age: 73
End: 2018-12-10
Payer: MEDICARE

## 2018-12-10 ENCOUNTER — CLINICAL SUPPORT (OUTPATIENT)
Dept: CARDIOLOGY | Facility: CLINIC | Age: 73
End: 2018-12-10
Payer: MEDICARE

## 2018-12-10 VITALS
BODY MASS INDEX: 40.94 KG/M2 | HEIGHT: 70 IN | WEIGHT: 285.94 LBS | HEART RATE: 61 BPM | SYSTOLIC BLOOD PRESSURE: 130 MMHG | DIASTOLIC BLOOD PRESSURE: 76 MMHG

## 2018-12-10 DIAGNOSIS — I48.0 PAF (PAROXYSMAL ATRIAL FIBRILLATION): Primary | ICD-10-CM

## 2018-12-10 DIAGNOSIS — I48.0 PAROXYSMAL ATRIAL FIBRILLATION: Primary | ICD-10-CM

## 2018-12-10 DIAGNOSIS — I48.0 PAF (PAROXYSMAL ATRIAL FIBRILLATION): ICD-10-CM

## 2018-12-10 PROCEDURE — 99213 OFFICE O/P EST LOW 20 MIN: CPT | Mod: PBBFAC,25 | Performed by: INTERNAL MEDICINE

## 2018-12-10 PROCEDURE — 93010 ELECTROCARDIOGRAM REPORT: CPT | Mod: S$PBB,,, | Performed by: INTERNAL MEDICINE

## 2018-12-10 PROCEDURE — 93005 ELECTROCARDIOGRAM TRACING: CPT | Mod: PBBFAC | Performed by: INTERNAL MEDICINE

## 2018-12-10 PROCEDURE — 99999 PR PBB SHADOW E&M-EST. PATIENT-LVL III: CPT | Mod: PBBFAC,,, | Performed by: INTERNAL MEDICINE

## 2018-12-10 PROCEDURE — 99214 OFFICE O/P EST MOD 30 MIN: CPT | Mod: S$PBB,,, | Performed by: INTERNAL MEDICINE

## 2018-12-10 NOTE — PROGRESS NOTES
Subjective:    Patient ID:  Trip Prasad is a 73 y.o. male who presents for follow-up of Atrial Fibrillation and Hyperlipidemia      73 yoM here for AF management. He suffered influenza and pneumonia 1/18. During his hospitalization he had AF documented on 1/15/18. He was started on apixaban as well as metoprolol. No recurrent symptomatic AF. Recent Zio patch, 4/18, revealed <1% AF burden. No history of TIA/CVA, PVD, CHF. No syncope or near syncope. Nl EF, nl ischemia on stress.     Interval history: No symptomatic recurrence of AF. Remains on apixaban and metoprolol.     Echo 1/18:  CONCLUSIONS     1 - Concentric remodeling.     2 - No wall motion abnormalities.     3 - Normal left ventricular systolic function (EF 60-65%).     4 - Normal left ventricular diastolic function.     5 - Normal right ventricular systolic function .     NM Stress 2/18:    Nuclear Quantitative Functional Analysis:   LVEF: 57 %    Impression: NORMAL MYOCARDIAL PERFUSION  1. The perfusion scan is free of evidence for myocardial ischemia or injury.   2. Resting wall motion is physiologic.   3. Resting LV function is normal.   4. The ventricular volumes are normal at rest and stress.   5. The extracardiac distribution of radioactivity is normal.     Past Medical History:  1/2014: Diabetes mellitus, type 2  2012: Glaucoma  No date: Hiatal hernia  No date: Hyperlipidemia  No date: Loss of hearing      Comment:  Hearing aids 6/12 bilateral  No date: Obesity (BMI 30-39.9)    Past Surgical History:  2013: EYE SURGERY; Bilateral      Comment:  Cataract  10/12: GLAUCOMA SURGERY  3/04: ROTATOR CUFF REPAIR; Right  No date: TONSILLECTOMY  No date: TOTAL KNEE ARTHROPLASTY; Left  11/19/15: Total right knee replacement; Right  No date: WISDOM TOOTH EXTRACTION    Social History    Socioeconomic History      Marital status:       Spouse name: Not on file      Number of children: 1      Years of education: Not on file      Highest education level:  "Not on file    Social Needs      Financial resource strain: Not on file      Food insecurity - worry: Not on file      Food insecurity - inability: Not on file      Transportation needs - medical: Not on file      Transportation needs - non-medical: Not on file    Occupational History      Occupation:         Comment: [employer] started 1990 for    Tobacco Use      Smoking status: Never Smoker      Smokeless tobacco: Never Used    Substance and Sexual Activity      Alcohol use: Yes        Alcohol/week: 0.0 oz        Comment: " very little"      Drug use: No      Sexual activity: Yes        Partners: Female    Other Topics      Concerns:        Not on file    Social History Narrative       3 kids, and     Review of patient's family history indicates:  Problem: Cancer      Relation: Maternal Aunt          Age of Onset: (Not Specified)  Problem: Heart disease      Relation: Maternal Aunt          Age of Onset: (Not Specified)  Problem: Heart disease      Relation: Maternal Grandfather          Age of Onset: (Not Specified)  Problem: Cancer      Relation: Maternal Uncle          Age of Onset: (Not Specified)          Review of Systems   Constitution: Negative.   HENT: Negative.    Eyes: Negative.    Cardiovascular: Negative for chest pain, dyspnea on exertion, leg swelling, near-syncope, palpitations and syncope.   Respiratory: Negative.  Negative for shortness of breath.    Endocrine: Negative.    Hematologic/Lymphatic: Negative.    Skin: Negative.    Musculoskeletal: Negative.    Gastrointestinal: Negative.    Genitourinary: Negative.    Neurological: Negative.  Negative for dizziness and light-headedness.   Psychiatric/Behavioral: Negative.    Allergic/Immunologic: Negative.         Objective:    Physical Exam   Constitutional: He is oriented to person, place, and time. He appears well-developed and well-nourished. No distress.   HENT:   Head: Normocephalic and atraumatic.   Eyes: EOM " are normal. Pupils are equal, round, and reactive to light.   Neck: Normal range of motion. No JVD present. No thyromegaly present.   Cardiovascular: Normal rate, regular rhythm, S1 normal, S2 normal and normal heart sounds. PMI is not displaced. Exam reveals no gallop and no friction rub.   No murmur heard.  Pulmonary/Chest: Effort normal and breath sounds normal. No respiratory distress. He has no wheezes. He has no rales.   Abdominal: Soft. Bowel sounds are normal. He exhibits no distension. There is no tenderness. There is no rebound and no guarding.   Musculoskeletal: Normal range of motion. He exhibits no edema or tenderness.   Neurological: He is alert and oriented to person, place, and time. No cranial nerve deficit.   Skin: Skin is warm and dry. No rash noted. No erythema.   Psychiatric: He has a normal mood and affect. His behavior is normal. Judgment and thought content normal.   Vitals reviewed.    ECG: NSR nl IN, QRS, QTc        Assessment:       1. Paroxysmal atrial fibrillation         Plan:       73 yoM pAF here for follow up. Stable symptoms, no known recurrence. Stay on current therapy. RTC 1y

## 2019-07-02 ENCOUNTER — OFFICE VISIT (OUTPATIENT)
Dept: PULMONOLOGY | Facility: CLINIC | Age: 74
End: 2019-07-02
Payer: MEDICARE

## 2019-07-02 ENCOUNTER — HOSPITAL ENCOUNTER (OUTPATIENT)
Dept: RADIOLOGY | Facility: HOSPITAL | Age: 74
Discharge: HOME | End: 2019-07-02
Attending: INTERNAL MEDICINE
Payer: MEDICARE

## 2019-07-02 ENCOUNTER — CLINICAL SUPPORT (OUTPATIENT)
Dept: PULMONOLOGY | Facility: CLINIC | Age: 74
End: 2019-07-02
Payer: MEDICARE

## 2019-07-02 VITALS
OXYGEN SATURATION: 94 % | HEART RATE: 82 BPM | DIASTOLIC BLOOD PRESSURE: 70 MMHG | HEIGHT: 70 IN | SYSTOLIC BLOOD PRESSURE: 122 MMHG | RESPIRATION RATE: 18 BRPM | BODY MASS INDEX: 40.81 KG/M2 | WEIGHT: 285.06 LBS

## 2019-07-02 DIAGNOSIS — R06.02 SOB (SHORTNESS OF BREATH): ICD-10-CM

## 2019-07-02 DIAGNOSIS — E78.00 PURE HYPERCHOLESTEROLEMIA: Chronic | ICD-10-CM

## 2019-07-02 DIAGNOSIS — J98.6 ELEVATED DIAPHRAGM: ICD-10-CM

## 2019-07-02 DIAGNOSIS — R94.2 RESTRICTIVE VENTILATORY DEFECT: Primary | ICD-10-CM

## 2019-07-02 DIAGNOSIS — E11.9 TYPE 2 DIABETES MELLITUS WITHOUT COMPLICATION, WITHOUT LONG-TERM CURRENT USE OF INSULIN: Chronic | ICD-10-CM

## 2019-07-02 LAB
BRPFT: ABNORMAL
FEF 25 75 CHG: 36.2 %
FEF 25 75 LLN: 1.02
FEF 25 75 POST REF: 102.2 %
FEF 25 75 PRE REF: 75.1 %
FEF 25 75 REF: 2.39
FET100 CHG: -7.3 %
FEV1 CHG: 12.6 %
FEV1 FVC CHG: 6.1 %
FEV1 FVC LLN: 62
FEV1 FVC POST REF: 107.9 %
FEV1 FVC PRE REF: 101.7 %
FEV1 FVC REF: 76
FEV1 LLN: 2.3
FEV1 POST REF: 73.4 %
FEV1 PRE REF: 65.2 %
FEV1 REF: 3.2
FVC CHG: 6.1 %
FVC LLN: 3.14
FVC POST REF: 67.7 %
FVC PRE REF: 63.8 %
FVC REF: 4.25
PEF CHG: 38.5 %
PEF LLN: 6.02
PEF POST REF: 98.9 %
PEF PRE REF: 71.5 %
PEF REF: 8.37
POST FEF 25 75: 2.44 L/S (ref 1.02–3.77)
POST FET 100: 7.38 SEC
POST FEV1 FVC: 81.66 % (ref 62.04–89.26)
POST FEV1: 2.35 L (ref 2.3–4.11)
POST FVC: 2.88 L (ref 3.14–5.37)
POST PEF: 8.28 L/S (ref 6.02–10.71)
PRE FEF 25 75: 1.79 L/S (ref 1.02–3.77)
PRE FET 100: 7.96 SEC
PRE FEV1 FVC: 76.96 % (ref 62.04–89.26)
PRE FEV1: 2.09 L (ref 2.3–4.11)
PRE FVC: 2.71 L (ref 3.14–5.37)
PRE PEF: 5.98 L/S (ref 6.02–10.71)

## 2019-07-02 PROCEDURE — 94060 EVALUATION OF WHEEZING: CPT | Mod: PBBFAC

## 2019-07-02 PROCEDURE — 99213 OFFICE O/P EST LOW 20 MIN: CPT | Mod: PBBFAC,25 | Performed by: INTERNAL MEDICINE

## 2019-07-02 PROCEDURE — 99999 PR PBB SHADOW E&M-EST. PATIENT-LVL III: CPT | Mod: PBBFAC,,, | Performed by: INTERNAL MEDICINE

## 2019-07-02 PROCEDURE — 71046 X-RAY EXAM CHEST 2 VIEWS: CPT | Mod: 26,,, | Performed by: RADIOLOGY

## 2019-07-02 PROCEDURE — 71046 XR CHEST PA AND LATERAL: ICD-10-PCS | Mod: 26,,, | Performed by: RADIOLOGY

## 2019-07-02 PROCEDURE — 94060 EVALUATION OF WHEEZING: CPT | Mod: 26,S$PBB,, | Performed by: INTERNAL MEDICINE

## 2019-07-02 PROCEDURE — 99214 OFFICE O/P EST MOD 30 MIN: CPT | Mod: 25,S$PBB,, | Performed by: INTERNAL MEDICINE

## 2019-07-02 PROCEDURE — 99999 PR PBB SHADOW E&M-EST. PATIENT-LVL III: ICD-10-PCS | Mod: PBBFAC,,, | Performed by: INTERNAL MEDICINE

## 2019-07-02 PROCEDURE — 99214 PR OFFICE/OUTPT VISIT, EST, LEVL IV, 30-39 MIN: ICD-10-PCS | Mod: 25,S$PBB,, | Performed by: INTERNAL MEDICINE

## 2019-07-02 PROCEDURE — 71046 X-RAY EXAM CHEST 2 VIEWS: CPT | Mod: TC

## 2019-07-02 PROCEDURE — 94060 PR EVAL OF BRONCHOSPASM: ICD-10-PCS | Mod: 26,S$PBB,, | Performed by: INTERNAL MEDICINE

## 2019-07-02 NOTE — PROGRESS NOTES
"cxSubjective:       Patient ID: Trip Prasad is a 73 y.o. male.    Chief Complaint: Restrictive ventilatory defect    73 year old male , Follow up visit since 07/11//2018  No respiratory symptoms: No cough, No wheezing  No interval health exacebations  Reviewed spirometry and cxr  PFT show  Moderate restriction    Repeat CXr next visit  I have reviewed the patient's medical history in detail and updated the computerized patient record.        Review of Systems   Constitutional: Negative.    HENT: Negative.    Respiratory: Negative for snoring, shortness of breath, wheezing and asthma nighttime symptoms.             Cardiovascular: Negative.    Genitourinary: Negative.    Endocrine: endocrine negative   Musculoskeletal: Negative.    Skin: Negative.    Gastrointestinal: Negative.    Neurological: Negative.    Psychiatric/Behavioral: Negative for sleep disturbance.       Objective:       Vitals:    07/02/19 1039   BP: 122/70   Pulse: 82   Resp: 18   SpO2: (!) 94%   Weight: 129.3 kg (285 lb 0.9 oz)   Height: 5' 10" (1.778 m)     Physical Exam   Constitutional: He is oriented to person, place, and time. He appears well-developed and well-nourished. He is obese.   HENT:   Head: Normocephalic.   Nose: Nose normal.   Neck: Normal range of motion. Neck supple. No tracheal deviation present. No thyromegaly present.   Cardiovascular: Normal rate, regular rhythm and normal heart sounds.   Pulmonary/Chest: Normal expansion, effort normal and breath sounds normal.   Abdominal: Soft. Bowel sounds are normal.   Musculoskeletal: Normal range of motion. He exhibits no edema.   Lymphadenopathy:     He has no cervical adenopathy.   Neurological: He is alert and oriented to person, place, and time.   Skin: Skin is warm and dry.   Psychiatric: He has a normal mood and affect.   Nursing note and vitals reviewed.    Personal Diagnostic Review       Pulmonary function tests: FEV1: 2.09  (65.2 % predicted), FVC:  2.71 (63.8 % predicted), " FEV1/FVC:  76,      CXR today  FINDINGS:  Cardiac silhouette and mediastinal contours are stable.  Lungs are clear.  Osseous structures are intact.      Impression       No acute cardiopulmonary process.       No flowsheet data found.      Assessment:       Problem List Items Addressed This Visit     Diabetes mellitus, type 2 (Chronic)     Stable         Hyperlipidemia (Chronic)     Stable         BMI 40.0-44.9, adult     General weight loss/lifestyle modification strategies discussed (elicit support from others; identify saboteurs; non-food rewards).  Diet interventions: low calorie (1000 kCal/d) deficit diet           Elevated diaphragm     Stable  Prior eval with SNIFF -ve         Relevant Orders    X-Ray Chest PA And Lateral    Spirometry without Bronchodilator    Restrictive ventilatory defect - Primary     Restriction due to body habitus  FV1 2.09L( 65.2%), FVC 2.71L( 63.8%), FEV1/FVC 76.96  FEV1 improved by 12% with BRD, declined offer for NINA  Has no SOB  Will monitor         Relevant Orders    X-Ray Chest PA And Lateral    Spirometry without Bronchodilator        Plan:       STABLE    Follow up in about 1 year (around 7/2/2020) for Spirometry and CXR next visit, Weight loss and exercise.    This note was prepared using voice recognition system and is likely to have sound alike errors that may have been overlooked even after proof reading.  Please call me with any questions    Discussed diagnosis, its evaluation, treatment and usual course. All questions answered.    Thank you for the courtesy of participating in the care of this patient    Taqueria Garcia MD

## 2019-07-02 NOTE — ASSESSMENT & PLAN NOTE
Restriction due to body habitus  FV1 2.09L( 65.2%), FVC 2.71L( 63.8%), FEV1/FVC 76.96  FEV1 improved by 12% with BRD, declined offer for NINA  Has no SOB  Will monitor

## 2019-09-10 RX ORDER — METOPROLOL TARTRATE 25 MG/1
25 TABLET, FILM COATED ORAL 2 TIMES DAILY
Qty: 60 TABLET | Refills: 3 | Status: SHIPPED | OUTPATIENT
Start: 2019-09-10 | End: 2019-12-12 | Stop reason: SDUPTHER

## 2019-09-10 NOTE — TELEPHONE ENCOUNTER
I spoke with patient and he plans to follow with Dr Vick so Rx refill sent to Dr Vick for refill auth.

## 2019-09-10 NOTE — TELEPHONE ENCOUNTER
----- Message from July Long sent at 9/10/2019 12:23 PM CDT -----  Contact: pt  Type:  RX Refill Request    Who Called: pt  Refill or New Rx:refill  RX Name and Strength:metoprolol tartrate 25 mg   How is the patient currently taking it? (ex. 1XDay):2Xday  Is this a 30 day or 90 day RX:90  Preferred Pharmacy with phone number:.  [facility] [address] [address]  Phone: [phone] Fax: [phone]  Local or Mail Order:local  Ordering Provider:Dr Burgos  Would the patient rather a call back or a response via [facility]? Call back  Best Call Back Number:[phone]  Additional Information: [facility] has one more for today and 1 tomorrow at noon.    Thank you

## 2019-11-08 ENCOUNTER — TELEPHONE (OUTPATIENT)
Dept: ELECTROPHYSIOLOGY | Facility: CLINIC | Age: 74
End: 2019-11-08

## 2019-11-08 NOTE — TELEPHONE ENCOUNTER
Patient spoke with Dena and told her that his MD wants him to hold Eliquis for 7 days prior to biopsy. I left message for patient advising that per protocol, he should only need to hold the Eliquis for 2 days prior to biopsy. If his physician wants to reach out to discuss with Dr Vick, he can call him directly.

## 2019-11-08 NOTE — TELEPHONE ENCOUNTER
----- Message from Nandini Lu RN sent at 11/8/2019  2:09 PM CST -----  Contact: pt  He wants to schedule his yearly follow up. Can you please let him know it's ok to hold the Eliquis for 2 days prior to the biopsy?  Thanks  ----- Message -----  From: Dena Butterfield MA  Sent: 11/8/2019   1:49 PM CST  To: Nandini Lu RN        ----- Message -----  From: Gayle Carlin  Sent: 11/8/2019  11:57 AM CST  To: Nicanor Palencia Staff    .Type:  Needs Medical Advice    Who Called: TENZIN RUIZ   Symptoms (please be specific):    How long has patient had these symptoms:   Pharmacy name and phone #:    Would the patient rather a call back or a response via [facility]? call  Best Call Back Number:  [phone]    Additional Information:Pt is requesting a call back from the nurse in regards to the pt needing to know if he can stop taking his blood thinner med  ELIQUIS 5 mg for him to have a biopsy done for Dr Antunez and to schedule his 1 year follow up apt

## 2019-12-12 RX ORDER — METOPROLOL TARTRATE 25 MG/1
25 TABLET, FILM COATED ORAL 2 TIMES DAILY
Qty: 90 TABLET | Refills: 5 | Status: SHIPPED | OUTPATIENT
Start: 2019-12-12 | End: 2020-01-20 | Stop reason: SDUPTHER

## 2019-12-27 ENCOUNTER — TELEPHONE (OUTPATIENT)
Dept: ELECTROPHYSIOLOGY | Facility: CLINIC | Age: 74
End: 2019-12-27

## 2019-12-27 NOTE — TELEPHONE ENCOUNTER
----- Message from Kt Diallo MA sent at 12/27/2019 11:25 AM CST -----  Contact: Patient      ----- Message -----  From: Lydia Alarcon  Sent: 12/27/2019  11:19 AM CST  To: Nicanor Palencia Staff    Type: Needs Medical Advice    Who Called:  Patient  Symptoms (please be specific):  na  How long has patient had these symptoms:  na  Pharmacy name and phone #:  na  Best Call Back Number: [phone] (home)    Additional Information: Patient is requesting a call back to discuss Dr. Vick consulting with another provider regarding a procedure. Please call to discuss, thank you!

## 2019-12-27 NOTE — TELEPHONE ENCOUNTER
Spoke with Mr. Prasad about holding his Eliquis for an upcoming procedure. I told him that he is cleared to hold Eliquis for 48 hours prior to his procedure. I let him know that his Doctor can call and speak with Dr. Vick if he has questions. Patient said that he will call the office and let them know and that he appreciates the call.

## 2020-01-20 RX ORDER — METOPROLOL TARTRATE 25 MG/1
25 TABLET, FILM COATED ORAL 2 TIMES DAILY
Qty: 90 TABLET | Refills: 5 | Status: SHIPPED | OUTPATIENT
Start: 2020-01-20 | End: 2020-11-30

## 2020-03-24 ENCOUNTER — TELEPHONE (OUTPATIENT)
Dept: ELECTROPHYSIOLOGY | Facility: CLINIC | Age: 75
End: 2020-03-24

## 2020-05-08 ENCOUNTER — TELEPHONE (OUTPATIENT)
Dept: ELECTROPHYSIOLOGY | Facility: CLINIC | Age: 75
End: 2020-05-08

## 2020-05-08 NOTE — TELEPHONE ENCOUNTER
Fax received today from [facility] office requesting d/c blood thinner 5-7 days and cardiac clearance.  It is unclear to me what type of procedure he is being scheduled for.     The office number listed is [phone] and the fax # is [phone].      There is no area on fax for comments or signature.      Will send message to Dr. Vick's nurse for review/processing.

## 2020-05-27 ENCOUNTER — TELEPHONE (OUTPATIENT)
Dept: PULMONOLOGY | Facility: CLINIC | Age: 75
End: 2020-05-27

## 2020-05-27 NOTE — TELEPHONE ENCOUNTER
Left vm informing pt that upcoming appt has been rescheduled due to provider not being in clinic. Return call with any questions. Appt letter sent.

## 2020-09-11 DIAGNOSIS — R94.2 RESTRICTIVE VENTILATORY DEFECT: Primary | ICD-10-CM

## 2020-09-15 ENCOUNTER — TELEPHONE (OUTPATIENT)
Dept: PULMONOLOGY | Facility: CLINIC | Age: 75
End: 2020-09-15

## 2020-09-15 NOTE — TELEPHONE ENCOUNTER
Called pt to schedule pre-procedural Covid-19 testing, 72 hours prior to Spirometry. Pt requested to give his nurse a call back. Pt may need to r/s appts.

## 2020-11-20 ENCOUNTER — TELEPHONE (OUTPATIENT)
Dept: ELECTROPHYSIOLOGY | Facility: CLINIC | Age: 75
End: 2020-11-20

## 2020-11-20 NOTE — TELEPHONE ENCOUNTER
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 Please call back at .[phone].        Thank you ,   Michelle Pink

## 2020-11-23 ENCOUNTER — TELEPHONE (OUTPATIENT)
Dept: CARDIOLOGY | Facility: CLINIC | Age: 75
End: 2020-11-23

## 2020-11-23 NOTE — TELEPHONE ENCOUNTER
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 Letter will need what kind of treatment Pt is on, condition of Pt & if stable. If can send letter to [place], Pt can pick-up there. Thanks     [phone]

## 2020-11-25 ENCOUNTER — HOSPITAL ENCOUNTER (OUTPATIENT)
Dept: CARDIOLOGY | Facility: HOSPITAL | Age: 75
Discharge: HOME | End: 2020-11-25
Attending: INTERNAL MEDICINE
Payer: MEDICARE

## 2020-11-25 ENCOUNTER — OFFICE VISIT (OUTPATIENT)
Dept: CARDIOLOGY | Facility: CLINIC | Age: 75
End: 2020-11-25
Payer: MEDICARE

## 2020-11-25 VITALS
BODY MASS INDEX: 41.72 KG/M2 | WEIGHT: 291.44 LBS | SYSTOLIC BLOOD PRESSURE: 134 MMHG | HEART RATE: 80 BPM | DIASTOLIC BLOOD PRESSURE: 70 MMHG | HEIGHT: 70 IN

## 2020-11-25 DIAGNOSIS — I48.0 PAROXYSMAL ATRIAL FIBRILLATION: Primary | ICD-10-CM

## 2020-11-25 DIAGNOSIS — I48.0 PAROXYSMAL ATRIAL FIBRILLATION: ICD-10-CM

## 2020-11-25 PROCEDURE — 99214 OFFICE O/P EST MOD 30 MIN: CPT | Mod: S$PBB,,, | Performed by: INTERNAL MEDICINE

## 2020-11-25 PROCEDURE — 99999 PR PBB SHADOW E&M-EST. PATIENT-LVL III: ICD-10-PCS | Mod: PBBFAC,,, | Performed by: INTERNAL MEDICINE

## 2020-11-25 PROCEDURE — 93010 ELECTROCARDIOGRAM REPORT: CPT | Mod: ,,, | Performed by: INTERNAL MEDICINE

## 2020-11-25 PROCEDURE — 99213 OFFICE O/P EST LOW 20 MIN: CPT | Mod: PBBFAC,25 | Performed by: INTERNAL MEDICINE

## 2020-11-25 PROCEDURE — 99999 PR PBB SHADOW E&M-EST. PATIENT-LVL III: CPT | Mod: PBBFAC,,, | Performed by: INTERNAL MEDICINE

## 2020-11-25 PROCEDURE — 93010 EKG 12-LEAD: ICD-10-PCS | Mod: ,,, | Performed by: INTERNAL MEDICINE

## 2020-11-25 PROCEDURE — 99214 PR OFFICE/OUTPT VISIT, EST, LEVL IV, 30-39 MIN: ICD-10-PCS | Mod: S$PBB,,, | Performed by: INTERNAL MEDICINE

## 2020-11-25 PROCEDURE — 93005 ELECTROCARDIOGRAM TRACING: CPT

## 2020-11-25 RX ORDER — BRIMONIDINE TARTRATE 1.5 MG/ML
SOLUTION/ DROPS OPHTHALMIC
COMMUNITY

## 2020-11-25 NOTE — PROGRESS NOTES
Subjective:    Patient ID:  Trip Prasad is a 75 y.o. male who presents for follow-up of Paroxysmal atrial fibrillation      75 yoM here for AF management. He suffered influenza and pneumonia 1/18. During his hospitalization he had AF documented on 1/15/18. He was started on apixaban as well as metoprolol. No recurrent symptomatic AF. Recent Zio patch, 4/18, revealed <1% AF burden. No history of TIA/CVA, PVD, CHF. No syncope or near syncope. Nl EF, nl ischemia on stress.     12/18: No symptomatic recurrence of AF. Remains on apixaban and metoprolol.     Interval history: No symptomatic recurrence of AF.     Echo 1/18:  CONCLUSIONS     1 - Concentric remodeling.     2 - No wall motion abnormalities.     3 - Normal left ventricular systolic function (EF 60-65%).     4 - Normal left ventricular diastolic function.     5 - Normal right ventricular systolic function .     NM Stress 2/18:    Nuclear Quantitative Functional Analysis:   LVEF: 57 %    Impression: NORMAL MYOCARDIAL PERFUSION  1. The perfusion scan is free of evidence for myocardial ischemia or injury.   2. Resting wall motion is physiologic.   3. Resting LV function is normal.   4. The ventricular volumes are normal at rest and stress.   5. The extracardiac distribution of radioactivity is normal.     Past Medical History:  No date: Atrial fibrillation  1/2014: Diabetes mellitus, type 2  2012: Glaucoma  No date: Hiatal hernia  No date: Hyperlipidemia  No date: Loss of hearing      Comment:  Hearing aids 6/12 bilateral  No date: Obesity (BMI 30-39.9)    Past Surgical History:  2013: EYE SURGERY; Bilateral      Comment:  Cataract  10/12: GLAUCOMA SURGERY  3/04: ROTATOR CUFF REPAIR; Right  No date: TONSILLECTOMY  No date: TOTAL KNEE ARTHROPLASTY; Left  11/19/15: Total right knee replacement; Right  No date: WISDOM TOOTH EXTRACTION    Social History    Socioeconomic History      Marital status:       Spouse name: Not on file      Number of children:  "1      Years of education: Not on file      Highest education level: Not on file    Occupational History      Occupation:         Comment: [employer] started 1990 for    Social Needs      Financial resource strain: Not on file      Food insecurity        Worry: Not on file        Inability: Not on file      Transportation needs        Medical: Not on file        Non-medical: Not on file    Tobacco Use      Smoking status: Never Smoker      Smokeless tobacco: Never Used    Substance and Sexual Activity      Alcohol use: Yes        Alcohol/week: 0.0 standard drinks        Comment: " very little"      Drug use: No      Sexual activity: Yes        Partners: Female    Lifestyle      Physical activity        Days per week: Not on file        Minutes per session: Not on file      Stress: Not on file    Relationships      Social connections        Talks on phone: Not on file        Gets together: Not on file        Attends Sabianism service: Not on file        Active member of club or organization: Not on file        Attends meetings of clubs or organizations: Not on file        Relationship status: Not on file    Other Topics      Concerns:        Not on file    Social History Narrative       3 kids, and       Review of patient's family history indicates:  Problem: Cancer      Relation: Maternal Aunt          Age of Onset: (Not Specified)  Problem: Heart disease      Relation: Maternal Aunt          Age of Onset: (Not Specified)  Problem: Heart disease      Relation: Maternal Grandfather          Age of Onset: (Not Specified)  Problem: Cancer      Relation: Maternal Uncle          Age of Onset: (Not Specified)        Review of Systems   Constitution: Negative.   HENT: Negative.    Eyes: Negative.    Cardiovascular: Negative for chest pain, dyspnea on exertion, leg swelling, near-syncope, palpitations and syncope.   Respiratory: Negative.  Negative for shortness of breath.    Endocrine: " Negative.    Hematologic/Lymphatic: Negative.    Skin: Negative.    Musculoskeletal: Negative.    Gastrointestinal: Negative.    Genitourinary: Negative.    Neurological: Negative.  Negative for dizziness and light-headedness.   Psychiatric/Behavioral: Negative.    Allergic/Immunologic: Negative.         Objective:    Physical Exam   Constitutional: He is oriented to person, place, and time. He appears well-developed and well-nourished. No distress.   HENT:   Head: Normocephalic and atraumatic.   Eyes: Pupils are equal, round, and reactive to light. EOM are normal.   Neck: Normal range of motion. No JVD present. No thyromegaly present.   Cardiovascular: Normal rate, regular rhythm, S1 normal, S2 normal and normal heart sounds. PMI is not displaced. Exam reveals no gallop and no friction rub.   No murmur heard.  Pulmonary/Chest: Effort normal and breath sounds normal. No respiratory distress. He has no wheezes. He has no rales.   Abdominal: Soft. Bowel sounds are normal. He exhibits no distension. There is no abdominal tenderness. There is no rebound and no guarding.   Musculoskeletal: Normal range of motion.         General: No tenderness or edema.   Neurological: He is alert and oriented to person, place, and time. No cranial nerve deficit.   Skin: Skin is warm and dry. No rash noted. No erythema.   Psychiatric: He has a normal mood and affect. His behavior is normal. Judgment and thought content normal.   Vitals reviewed.        Assessment:       1. Paroxysmal atrial fibrillation         Plan:       75 yoM pAF here for follow up. No symptomatic recurrence since his initial diagnosis. Continue current therapy. RTC as needed    With regard to  license, there are no contraindications from an arrhythmia perspective.

## 2021-01-21 NOTE — TELEPHONE ENCOUNTER
----- Message from Jose Burgos MD sent at 4/3/2018  5:30 AM CDT -----  Event monitor with rare episodes of afib, continue eliquis and metorpolol   [Negative] : Heme/Lymph

## 2021-04-05 ENCOUNTER — TELEPHONE (OUTPATIENT)
Dept: CARDIOLOGY | Facility: CLINIC | Age: 76
End: 2021-04-05

## 2021-04-14 ENCOUNTER — OFFICE VISIT (OUTPATIENT)
Dept: CARDIOLOGY | Facility: CLINIC | Age: 76
End: 2021-04-14
Payer: MEDICARE

## 2021-04-14 VITALS
SYSTOLIC BLOOD PRESSURE: 136 MMHG | BODY MASS INDEX: 41.81 KG/M2 | WEIGHT: 291.38 LBS | DIASTOLIC BLOOD PRESSURE: 72 MMHG | HEART RATE: 76 BPM

## 2021-04-14 DIAGNOSIS — I48.0 PAROXYSMAL ATRIAL FIBRILLATION: Primary | ICD-10-CM

## 2021-04-14 DIAGNOSIS — K44.9 HIATAL HERNIA: Chronic | ICD-10-CM

## 2021-04-14 DIAGNOSIS — E78.00 PURE HYPERCHOLESTEROLEMIA: Chronic | ICD-10-CM

## 2021-04-14 PROCEDURE — 99214 PR OFFICE/OUTPT VISIT, EST, LEVL IV, 30-39 MIN: ICD-10-PCS | Mod: S$PBB,,, | Performed by: INTERNAL MEDICINE

## 2021-04-14 PROCEDURE — 99214 OFFICE O/P EST MOD 30 MIN: CPT | Mod: S$PBB,,, | Performed by: INTERNAL MEDICINE

## 2021-04-14 PROCEDURE — 99999 PR PBB SHADOW E&M-EST. PATIENT-LVL III: ICD-10-PCS | Mod: PBBFAC,,, | Performed by: INTERNAL MEDICINE

## 2021-04-14 PROCEDURE — 99213 OFFICE O/P EST LOW 20 MIN: CPT | Mod: PBBFAC,PO | Performed by: INTERNAL MEDICINE

## 2021-04-14 PROCEDURE — 99999 PR PBB SHADOW E&M-EST. PATIENT-LVL III: CPT | Mod: PBBFAC,,, | Performed by: INTERNAL MEDICINE

## 2021-04-28 ENCOUNTER — TELEPHONE (OUTPATIENT)
Dept: CARDIOLOGY | Facility: CLINIC | Age: 76
End: 2021-04-28

## 2023-01-10 ENCOUNTER — TELEPHONE (OUTPATIENT)
Dept: CARDIOLOGY | Facility: CLINIC | Age: 78
End: 2023-01-10
Payer: MEDICARE

## 2023-01-10 NOTE — TELEPHONE ENCOUNTER
Attempted without success x2 to contact pt to discuss message and schedule appt with Dr. Burgos. Left voicemail for pt to call back.     ----- Message from Dena Butterfield MA sent at 1/9/2023  1:19 PM CST -----  Contact: Trip    ----- Message -----  From: Freda Newman  Sent: 1/9/2023  12:15 PM CST  To: Nicanor Dominique is needing an updated letter of release due to him going back to work. Please call him back at [phone].

## 2023-01-11 ENCOUNTER — TELEPHONE (OUTPATIENT)
Dept: CARDIOLOGY | Facility: CLINIC | Age: 78
End: 2023-01-11
Payer: MEDICARE

## 2023-01-11 NOTE — TELEPHONE ENCOUNTER
----- Message from Talha Vick MD sent at 1/9/2023  1:47 PM CST -----  Contact: Trip  I have not seen him in 2 years. Back then he had no active arrhythmia issues. Why am I being asked to clear him to return to work?  ----- Message -----  From: Cheryl Garcia LPN  Sent: 1/9/2023   1:26 PM CST  To: Talha Vick MD      ----- Message -----  From: Dena Butterfield MA  Sent: 1/9/2023   1:20 PM CST  To: Cheryl Garcia LPN, Aubrey VELIZ Staff      ----- Message -----  From: Freda Newman  Sent: 1/9/2023  12:15 PM CST  To: Nicanor Ovalle    Trip is needing an updated letter of release due to him going back to work. Please call him back at [phone].

## 2023-01-23 ENCOUNTER — OFFICE VISIT (OUTPATIENT)
Dept: CARDIOLOGY | Facility: CLINIC | Age: 78
End: 2023-01-23
Payer: MEDICARE

## 2023-01-23 VITALS
BODY MASS INDEX: 41.66 KG/M2 | OXYGEN SATURATION: 95 % | SYSTOLIC BLOOD PRESSURE: 138 MMHG | WEIGHT: 291 LBS | HEIGHT: 70 IN | DIASTOLIC BLOOD PRESSURE: 80 MMHG

## 2023-01-23 DIAGNOSIS — E78.00 PURE HYPERCHOLESTEROLEMIA: Chronic | ICD-10-CM

## 2023-01-23 DIAGNOSIS — I48.0 PAROXYSMAL ATRIAL FIBRILLATION: Primary | ICD-10-CM

## 2023-01-23 DIAGNOSIS — Z79.01 CHRONIC ANTICOAGULATION: ICD-10-CM

## 2023-01-23 PROCEDURE — 3079F DIAST BP 80-89 MM HG: CPT | Mod: CPTII,S$GLB,, | Performed by: INTERNAL MEDICINE

## 2023-01-23 PROCEDURE — 1126F AMNT PAIN NOTED NONE PRSNT: CPT | Mod: CPTII,S$GLB,, | Performed by: INTERNAL MEDICINE

## 2023-01-23 PROCEDURE — 3288F PR FALLS RISK ASSESSMENT DOCUMENTED: ICD-10-PCS | Mod: CPTII,S$GLB,, | Performed by: INTERNAL MEDICINE

## 2023-01-23 PROCEDURE — 1101F PT FALLS ASSESS-DOCD LE1/YR: CPT | Mod: CPTII,S$GLB,, | Performed by: INTERNAL MEDICINE

## 2023-01-23 PROCEDURE — 1160F RVW MEDS BY RX/DR IN RCRD: CPT | Mod: CPTII,S$GLB,, | Performed by: INTERNAL MEDICINE

## 2023-01-23 PROCEDURE — 1160F PR REVIEW ALL MEDS BY PRESCRIBER/CLIN PHARMACIST DOCUMENTED: ICD-10-PCS | Mod: CPTII,S$GLB,, | Performed by: INTERNAL MEDICINE

## 2023-01-23 PROCEDURE — 99999 PR PBB SHADOW E&M-EST. PATIENT-LVL III: CPT | Mod: PBBFAC,,, | Performed by: INTERNAL MEDICINE

## 2023-01-23 PROCEDURE — 1159F MED LIST DOCD IN RCRD: CPT | Mod: CPTII,S$GLB,, | Performed by: INTERNAL MEDICINE

## 2023-01-23 PROCEDURE — 3288F FALL RISK ASSESSMENT DOCD: CPT | Mod: CPTII,S$GLB,, | Performed by: INTERNAL MEDICINE

## 2023-01-23 PROCEDURE — 3075F SYST BP GE 130 - 139MM HG: CPT | Mod: CPTII,S$GLB,, | Performed by: INTERNAL MEDICINE

## 2023-01-23 PROCEDURE — 3075F PR MOST RECENT SYSTOLIC BLOOD PRESS GE 130-139MM HG: ICD-10-PCS | Mod: CPTII,S$GLB,, | Performed by: INTERNAL MEDICINE

## 2023-01-23 PROCEDURE — 1101F PR PT FALLS ASSESS DOC 0-1 FALLS W/OUT INJ PAST YR: ICD-10-PCS | Mod: CPTII,S$GLB,, | Performed by: INTERNAL MEDICINE

## 2023-01-23 PROCEDURE — 99214 PR OFFICE/OUTPT VISIT, EST, LEVL IV, 30-39 MIN: ICD-10-PCS | Mod: S$GLB,,, | Performed by: INTERNAL MEDICINE

## 2023-01-23 PROCEDURE — 99999 PR PBB SHADOW E&M-EST. PATIENT-LVL III: ICD-10-PCS | Mod: PBBFAC,,, | Performed by: INTERNAL MEDICINE

## 2023-01-23 PROCEDURE — 1159F PR MEDICATION LIST DOCUMENTED IN MEDICAL RECORD: ICD-10-PCS | Mod: CPTII,S$GLB,, | Performed by: INTERNAL MEDICINE

## 2023-01-23 PROCEDURE — 3079F PR MOST RECENT DIASTOLIC BLOOD PRESSURE 80-89 MM HG: ICD-10-PCS | Mod: CPTII,S$GLB,, | Performed by: INTERNAL MEDICINE

## 2023-01-23 PROCEDURE — 99214 OFFICE O/P EST MOD 30 MIN: CPT | Mod: S$GLB,,, | Performed by: INTERNAL MEDICINE

## 2023-01-23 PROCEDURE — 1126F PR PAIN SEVERITY QUANTIFIED, NO PAIN PRESENT: ICD-10-PCS | Mod: CPTII,S$GLB,, | Performed by: INTERNAL MEDICINE

## 2023-01-23 NOTE — PROGRESS NOTES
Subjective:   Patient ID:  Trip Prasad is a 77 y.o. male who presents for follow-up of No chief complaint on file.    Patient denies CP, angina or anginal equivalent.   Stress test/NMT normal 2018  Hypertension  This is a chronic problem. The current episode started more than 1 year ago. The problem has been gradually improving since onset. The problem is controlled. Pertinent negatives include no chest pain, palpitations or shortness of breath. Past treatments include beta blockers. The current treatment provides moderate improvement. There are no compliance problems.    Atrial Fibrillation  Presents for follow-up visit. Symptoms are negative for bradycardia, chest pain, dizziness, palpitations, shortness of breath, syncope, tachycardia and weakness. The symptoms have been stable. There are no medication compliance problems.     Review of Systems   Constitutional: Negative. Negative for weight gain.   HENT: Negative.     Eyes: Negative.    Cardiovascular: Negative.  Negative for chest pain, leg swelling, palpitations and syncope.   Respiratory: Negative.  Negative for shortness of breath.    Endocrine: Negative.    Hematologic/Lymphatic: Negative.    Skin: Negative.    Musculoskeletal:  Negative for muscle weakness.   Gastrointestinal: Negative.    Genitourinary: Negative.    Neurological: Negative.  Negative for dizziness and weakness.   Psychiatric/Behavioral: Negative.     Allergic/Immunologic: Negative.    All other systems reviewed and are negative.  Family History   Problem Relation Age of Onset    Cancer Maternal Aunt     Heart disease Maternal Aunt     Heart disease Maternal Grandfather     Cancer Maternal Uncle      Past Medical History:   Diagnosis Date    Atrial fibrillation     Diabetes mellitus, type 2 1/2014    Glaucoma 2012    Hiatal hernia     Hyperlipidemia     Loss of hearing     Hearing aids 6/12 bilateral    Obesity (BMI 30-39.9)      Social History     Socioeconomic History    Marital  "status:     Number of children: 1   Occupational History    Occupation:      Comment: [employer] started 1990 for   Tobacco Use    Smoking status: Never    Smokeless tobacco: Never   Substance and Sexual Activity    Alcohol use: Yes     Alcohol/week: 0.0 standard drinks     Comment: " very little"    Drug use: No    Sexual activity: Yes     Partners: Female   Social History Narrative     3 kids, and      Current Outpatient Medications on File Prior to Visit   Medication Sig Dispense Refill    apixaban (ELIQUIS) 5 mg Tab Take 1 tablet (5 mg total) by mouth 2 (two) times daily. 60 tablet 11    azelastine HCl (AZELASTINE OPHT) Apply 1 drop to eye 2 (two) times daily.      blood sugar diagnostic Strp 1 strip by Misc.(Non-Drug; Combo Route) route once daily. 100 strip 3    brimonidine 0.15 % OPTH DROP (ALPHAGAN) 0.15 % ophthalmic solution Alphagan P 0.15 % eye drops      brinzolamide-brimonidine 1-0.2 % DrpS Apply to eye.      loratadine (CLARITIN) 10 mg tablet Take 10 mg by mouth once daily.      metoprolol tartrate (LOPRESSOR) 25 MG tablet TAKE 1 TABLET BY MOUTH TWICE DAILY 90 tablet 0    montelukast (SINGULAIR) 10 mg tablet Take 10 mg by mouth every evening.      ONETOUCH DELICA LANCETS 33 gauge Misc USE TO CHECK BLOOD SUGAR TWICE DAILY  5     No current facility-administered medications on file prior to visit.     Review of patient's allergies indicates:   Allergen Reactions    Biaxin [clarithromycin] Other (See Comments)     Pt unsure     Pcn [penicillins]     Metformin Rash     Rash over body        Objective:     Physical Exam  Vitals and nursing note reviewed.   Constitutional:       Appearance: He is well-developed.   HENT:      Head: Normocephalic and atraumatic.   Eyes:      Conjunctiva/sclera: Conjunctivae normal.      Pupils: Pupils are equal, round, and reactive to light.   Cardiovascular:      Rate and Rhythm: Normal rate and regular rhythm.      Pulses: Intact distal " pulses.      Heart sounds: Normal heart sounds.   Pulmonary:      Effort: Pulmonary effort is normal.      Breath sounds: Normal breath sounds.   Abdominal:      General: Bowel sounds are normal.      Palpations: Abdomen is soft.   Musculoskeletal:      Cervical back: Normal range of motion and neck supple.   Skin:     General: Skin is warm and dry.   Neurological:      Mental Status: He is alert and oriented to person, place, and time.       Assessment:     1. Paroxysmal atrial fibrillation    2. Pure hypercholesterolemia    3. Chronic anticoagulation        Plan:     Paroxysmal atrial fibrillation    Pure hypercholesterolemia    Chronic anticoagulation        Continue eliquis, metoprolol- afib  Eliquis anticoagulation for afib  Metoprolol for rate control afib

## 2023-07-17 ENCOUNTER — OFFICE VISIT (OUTPATIENT)
Dept: CARDIOLOGY | Facility: CLINIC | Age: 78
End: 2023-07-17
Payer: MEDICARE

## 2023-07-17 VITALS
SYSTOLIC BLOOD PRESSURE: 128 MMHG | WEIGHT: 286.63 LBS | HEIGHT: 70 IN | HEART RATE: 76 BPM | BODY MASS INDEX: 41.03 KG/M2 | DIASTOLIC BLOOD PRESSURE: 74 MMHG

## 2023-07-17 DIAGNOSIS — I48.0 PAROXYSMAL ATRIAL FIBRILLATION: Primary | ICD-10-CM

## 2023-07-17 DIAGNOSIS — E78.00 PURE HYPERCHOLESTEROLEMIA: Chronic | ICD-10-CM

## 2023-07-17 DIAGNOSIS — Z79.01 CHRONIC ANTICOAGULATION: ICD-10-CM

## 2023-07-17 PROCEDURE — 99999 PR PBB SHADOW E&M-EST. PATIENT-LVL III: ICD-10-PCS | Mod: PBBFAC,,, | Performed by: INTERNAL MEDICINE

## 2023-07-17 PROCEDURE — 1101F PT FALLS ASSESS-DOCD LE1/YR: CPT | Mod: CPTII,S$GLB,, | Performed by: INTERNAL MEDICINE

## 2023-07-17 PROCEDURE — 3288F FALL RISK ASSESSMENT DOCD: CPT | Mod: CPTII,S$GLB,, | Performed by: INTERNAL MEDICINE

## 2023-07-17 PROCEDURE — 3078F PR MOST RECENT DIASTOLIC BLOOD PRESSURE < 80 MM HG: ICD-10-PCS | Mod: CPTII,S$GLB,, | Performed by: INTERNAL MEDICINE

## 2023-07-17 PROCEDURE — 1159F MED LIST DOCD IN RCRD: CPT | Mod: CPTII,S$GLB,, | Performed by: INTERNAL MEDICINE

## 2023-07-17 PROCEDURE — 1159F PR MEDICATION LIST DOCUMENTED IN MEDICAL RECORD: ICD-10-PCS | Mod: CPTII,S$GLB,, | Performed by: INTERNAL MEDICINE

## 2023-07-17 PROCEDURE — 1126F AMNT PAIN NOTED NONE PRSNT: CPT | Mod: CPTII,S$GLB,, | Performed by: INTERNAL MEDICINE

## 2023-07-17 PROCEDURE — 3288F PR FALLS RISK ASSESSMENT DOCUMENTED: ICD-10-PCS | Mod: CPTII,S$GLB,, | Performed by: INTERNAL MEDICINE

## 2023-07-17 PROCEDURE — 1101F PR PT FALLS ASSESS DOC 0-1 FALLS W/OUT INJ PAST YR: ICD-10-PCS | Mod: CPTII,S$GLB,, | Performed by: INTERNAL MEDICINE

## 2023-07-17 PROCEDURE — 99214 PR OFFICE/OUTPT VISIT, EST, LEVL IV, 30-39 MIN: ICD-10-PCS | Mod: S$GLB,,, | Performed by: INTERNAL MEDICINE

## 2023-07-17 PROCEDURE — 3074F SYST BP LT 130 MM HG: CPT | Mod: CPTII,S$GLB,, | Performed by: INTERNAL MEDICINE

## 2023-07-17 PROCEDURE — 3074F PR MOST RECENT SYSTOLIC BLOOD PRESSURE < 130 MM HG: ICD-10-PCS | Mod: CPTII,S$GLB,, | Performed by: INTERNAL MEDICINE

## 2023-07-17 PROCEDURE — 99214 OFFICE O/P EST MOD 30 MIN: CPT | Mod: S$GLB,,, | Performed by: INTERNAL MEDICINE

## 2023-07-17 PROCEDURE — 3078F DIAST BP <80 MM HG: CPT | Mod: CPTII,S$GLB,, | Performed by: INTERNAL MEDICINE

## 2023-07-17 PROCEDURE — 1126F PR PAIN SEVERITY QUANTIFIED, NO PAIN PRESENT: ICD-10-PCS | Mod: CPTII,S$GLB,, | Performed by: INTERNAL MEDICINE

## 2023-07-17 PROCEDURE — 99999 PR PBB SHADOW E&M-EST. PATIENT-LVL III: CPT | Mod: PBBFAC,,, | Performed by: INTERNAL MEDICINE

## 2023-07-17 RX ORDER — IBUPROFEN 100 MG/5ML
1000 SUSPENSION, ORAL (FINAL DOSE FORM) ORAL DAILY
COMMUNITY

## 2023-07-17 RX ORDER — MULTIVIT WITH MINERALS/HERBS
1 TABLET ORAL DAILY
COMMUNITY

## 2023-07-17 NOTE — PROGRESS NOTES
Subjective:   Patient ID:  Trip Prasad is a 77 y.o. male who presents for follow-up of No chief complaint on file.  Patient denies CP, angina or anginal equivalent. NMT stress 2018 nml    Hypertension  This is a chronic problem. The current episode started more than 1 year ago. The problem has been gradually improving since onset. The problem is controlled. Pertinent negatives include no chest pain, palpitations or shortness of breath. Past treatments include beta blockers. The current treatment provides moderate improvement. There are no compliance problems.    Atrial Fibrillation  Presents for follow-up visit. Symptoms are negative for bradycardia, chest pain, dizziness, palpitations, shortness of breath, syncope, tachycardia and weakness. The symptoms have been stable. There are no medication compliance problems.     Review of Systems   Constitutional: Negative. Negative for weight gain.   HENT: Negative.     Eyes: Negative.    Cardiovascular: Negative.  Negative for chest pain, leg swelling, palpitations and syncope.   Respiratory: Negative.  Negative for shortness of breath.    Endocrine: Negative.    Hematologic/Lymphatic: Negative.    Skin: Negative.    Musculoskeletal:  Negative for muscle weakness.   Gastrointestinal: Negative.    Genitourinary: Negative.    Neurological: Negative.  Negative for dizziness and weakness.   Psychiatric/Behavioral: Negative.     Allergic/Immunologic: Negative.    All other systems reviewed and are negative.  Family History   Problem Relation Age of Onset    Cancer Maternal Aunt     Heart disease Maternal Aunt     Heart disease Maternal Grandfather     Cancer Maternal Uncle      Past Medical History:   Diagnosis Date    Atrial fibrillation     Diabetes mellitus, type 2 1/2014    Glaucoma 2012    Hiatal hernia     Hyperlipidemia     Loss of hearing     Hearing aids 6/12 bilateral    Obesity (BMI 30-39.9)      Social History     Socioeconomic History    Marital status:  "    Number of children: 1   Occupational History    Occupation:      Comment: [employer] started 1990 for   Tobacco Use    Smoking status: Never    Smokeless tobacco: Never   Substance and Sexual Activity    Alcohol use: Yes     Alcohol/week: 0.0 standard drinks     Comment: " very little"    Drug use: No    Sexual activity: Yes     Partners: Female   Social History Narrative     3 kids, and      Current Outpatient Medications on File Prior to Visit   Medication Sig Dispense Refill    apixaban (ELIQUIS) 5 mg Tab Take 1 tablet (5 mg total) by mouth 2 (two) times daily. 60 tablet 11    azelastine HCl (AZELASTINE OPHT) Apply 1 drop to eye 2 (two) times daily.      blood sugar diagnostic Strp 1 strip by Misc.(Non-Drug; Combo Route) route once daily. 100 strip 3    brimonidine 0.15 % OPTH DROP (ALPHAGAN) 0.15 % ophthalmic solution Alphagan P 0.15 % eye drops      brinzolamide-brimonidine 1-0.2 % DrpS Apply to eye.      loratadine (CLARITIN) 10 mg tablet Take 10 mg by mouth once daily.      metoprolol tartrate (LOPRESSOR) 25 MG tablet TAKE 1 TABLET BY MOUTH TWICE DAILY 90 tablet 0    montelukast (SINGULAIR) 10 mg tablet Take 10 mg by mouth every evening.      ONETOUCH DELICA LANCETS 33 gauge Misc USE TO CHECK BLOOD SUGAR TWICE DAILY  5     No current facility-administered medications on file prior to visit.     Review of patient's allergies indicates:   Allergen Reactions    Biaxin [clarithromycin] Other (See Comments)     Pt unsure     Pcn [penicillins]     Metformin Rash     Rash over body        Objective:     Physical Exam  Vitals and nursing note reviewed.   Constitutional:       Appearance: He is well-developed.   HENT:      Head: Normocephalic and atraumatic.   Eyes:      Conjunctiva/sclera: Conjunctivae normal.      Pupils: Pupils are equal, round, and reactive to light.   Cardiovascular:      Rate and Rhythm: Normal rate and regular rhythm.      Pulses: Intact distal pulses. "      Heart sounds: Normal heart sounds.   Pulmonary:      Effort: Pulmonary effort is normal.      Breath sounds: Normal breath sounds.   Abdominal:      General: Bowel sounds are normal.      Palpations: Abdomen is soft.   Musculoskeletal:      Cervical back: Normal range of motion and neck supple.   Skin:     General: Skin is warm and dry.   Neurological:      Mental Status: He is alert and oriented to person, place, and time.       Assessment:     1. Paroxysmal atrial fibrillation    2. Pure hypercholesterolemia    3. Chronic anticoagulation        Plan:     Paroxysmal atrial fibrillation    Pure hypercholesterolemia    Chronic anticoagulation      Continue eliquis, metoprolol- afib  Eliquis anticoagulation for afib  Metoprolol for rate control afib

## 2024-01-26 DIAGNOSIS — I48.0 PAROXYSMAL ATRIAL FIBRILLATION: Primary | ICD-10-CM

## 2024-01-29 ENCOUNTER — OFFICE VISIT (OUTPATIENT)
Dept: CARDIOLOGY | Facility: CLINIC | Age: 79
End: 2024-01-29
Payer: MEDICARE

## 2024-01-29 ENCOUNTER — HOSPITAL ENCOUNTER (OUTPATIENT)
Dept: CARDIOLOGY | Facility: HOSPITAL | Age: 79
Discharge: HOME | End: 2024-01-29
Attending: INTERNAL MEDICINE
Payer: MEDICARE

## 2024-01-29 VITALS
DIASTOLIC BLOOD PRESSURE: 75 MMHG | RESPIRATION RATE: 16 BRPM | HEART RATE: 77 BPM | BODY MASS INDEX: 41.12 KG/M2 | HEIGHT: 70 IN | WEIGHT: 287.25 LBS | OXYGEN SATURATION: 96 % | SYSTOLIC BLOOD PRESSURE: 122 MMHG

## 2024-01-29 DIAGNOSIS — I48.0 PAROXYSMAL ATRIAL FIBRILLATION: Primary | ICD-10-CM

## 2024-01-29 DIAGNOSIS — Z79.01 CHRONIC ANTICOAGULATION: ICD-10-CM

## 2024-01-29 DIAGNOSIS — I48.0 PAROXYSMAL ATRIAL FIBRILLATION: ICD-10-CM

## 2024-01-29 PROCEDURE — 1160F RVW MEDS BY RX/DR IN RCRD: CPT | Mod: CPTII,S$GLB,, | Performed by: INTERNAL MEDICINE

## 2024-01-29 PROCEDURE — 99999 PR PBB SHADOW E&M-EST. PATIENT-LVL IV: CPT | Mod: PBBFAC,,, | Performed by: INTERNAL MEDICINE

## 2024-01-29 PROCEDURE — 3074F SYST BP LT 130 MM HG: CPT | Mod: CPTII,S$GLB,, | Performed by: INTERNAL MEDICINE

## 2024-01-29 PROCEDURE — 93010 ELECTROCARDIOGRAM REPORT: CPT | Mod: ,,, | Performed by: INTERNAL MEDICINE

## 2024-01-29 PROCEDURE — 99214 OFFICE O/P EST MOD 30 MIN: CPT | Mod: S$GLB,,, | Performed by: INTERNAL MEDICINE

## 2024-01-29 PROCEDURE — 1159F MED LIST DOCD IN RCRD: CPT | Mod: CPTII,S$GLB,, | Performed by: INTERNAL MEDICINE

## 2024-01-29 PROCEDURE — 93005 ELECTROCARDIOGRAM TRACING: CPT

## 2024-01-29 PROCEDURE — 1101F PT FALLS ASSESS-DOCD LE1/YR: CPT | Mod: CPTII,S$GLB,, | Performed by: INTERNAL MEDICINE

## 2024-01-29 PROCEDURE — 3078F DIAST BP <80 MM HG: CPT | Mod: CPTII,S$GLB,, | Performed by: INTERNAL MEDICINE

## 2024-01-29 PROCEDURE — 3288F FALL RISK ASSESSMENT DOCD: CPT | Mod: CPTII,S$GLB,, | Performed by: INTERNAL MEDICINE

## 2024-01-29 RX ORDER — BIMATOPROST 0.3 MG/ML
1 SOLUTION/ DROPS OPHTHALMIC NIGHTLY
COMMUNITY

## 2024-01-29 RX ORDER — DORZOLAMIDE HCL 20 MG/ML
1 SOLUTION/ DROPS OPHTHALMIC 3 TIMES DAILY
COMMUNITY

## 2024-01-29 NOTE — PROGRESS NOTES
Subjective:   Patient ID:  Trip Prasad is a 78 y.o. male who presents for follow-up of No chief complaint on file.  Patient denies CP, angina or anginal equivalent. NMT stress 2018 nml   Pt with CP relieved with belching. Pt recovered from prostate Ca.  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 EKG today NSR  Hypertension  This is a chronic problem. The current episode started more than 1 year ago. The problem has been gradually improving since onset. The problem is controlled. Pertinent negatives include no chest pain, palpitations or shortness of breath. Past treatments include beta blockers. The current treatment provides moderate improvement. There are no compliance problems.    Atrial Fibrillation  Presents for follow-up visit. Symptoms are negative for bradycardia, chest pain, dizziness, palpitations, shortness of breath, syncope, tachycardia and weakness. The symptoms have been stable. There are no medication compliance problems.       Review of Systems   Constitutional: Negative. Negative for weight gain.   HENT: Negative.     Eyes: Negative.    Cardiovascular: Negative.  Negative for chest pain, leg swelling, palpitations and syncope.   Respiratory: Negative.  Negative for shortness of breath.    Endocrine: Negative.    Hematologic/Lymphatic: Negative.    Skin: Negative.    Musculoskeletal:  Negative for muscle weakness.   Gastrointestinal: Negative.    Genitourinary: Negative.    Neurological: Negative.  Negative for dizziness and weakness.   Psychiatric/Behavioral: Negative.     Allergic/Immunologic: Negative.    All other systems reviewed and are negative.    Family History   Problem Relation Age of Onset    Cancer Maternal Aunt     Heart disease Maternal Aunt     Heart disease Maternal Grandfather     Cancer Maternal Uncle      Past Medical History:   Diagnosis Date    Atrial fibrillation     Diabetes mellitus, type 2 1/2014    Glaucoma 2012    Hiatal hernia     Hyperlipidemia     Loss of hearing   "   Hearing aids 6/12 bilateral    Obesity (BMI 30-39.9)      Social History     Socioeconomic History    Marital status:     Number of children: 1   Occupational History    Occupation:      Comment: [employer] started [employer] for   Tobacco Use    Smoking status: Never    Smokeless tobacco: Never   Substance and Sexual Activity    Alcohol use: Yes     Alcohol/week: 0.0 standard drinks of alcohol     Comment: " very little"    Drug use: No    Sexual activity: Yes     Partners: Female   Social History Narrative     3 kids, and      Current Outpatient Medications on File Prior to Visit   Medication Sig Dispense Refill    apixaban (ELIQUIS) 5 mg Tab Take 1 tablet (5 mg total) by mouth 2 (two) times daily. 60 tablet 11    azelastine HCl (AZELASTINE OPHT) Apply 1 drop to eye 2 (two) times daily.      b complex vitamins tablet Take 1 tablet by mouth once daily.      bimatoprost (LUMIGAN) 0.03 % ophthalmic drops 1 drop every evening.      blood sugar diagnostic Strp 1 strip by Misc.(Non-Drug; Combo Route) route once daily. 100 strip 3    brinzolamide-brimonidine 1-0.2 % DrpS Apply to eye.      dorzolamide (TRUSOPT) 2 % ophthalmic solution 1 drop 3 (three) times daily.      L.acid/B.animalis,bifid,infant (FORTIFY OPTIMA PROBIOTIC ORAL) Take by mouth.      metoprolol tartrate (LOPRESSOR) 25 MG tablet TAKE 1 TABLET BY MOUTH TWICE DAILY 90 tablet 0    omega-3s/dha/epa/fish oil/D3 (VITAMIN-D + OMEGA-3 ORAL) Take by mouth.      ONETOUCH DELICA LANCETS 33 gauge Misc USE TO CHECK BLOOD SUGAR TWICE DAILY  5    ascorbic acid, vitamin C, (VITAMIN C) 1000 MG tablet Take 1,000 mg by mouth once daily.      brimonidine 0.15 % OPTH DROP (ALPHAGAN) 0.15 % ophthalmic solution Alphagan P 0.15 % eye drops      loratadine (CLARITIN) 10 mg tablet Take 10 mg by mouth daily as needed.      montelukast (SINGULAIR) 10 mg tablet Take 10 mg by mouth every evening.       No current facility-administered medications on " file prior to visit.     Review of patient's allergies indicates:   Allergen Reactions    Biaxin [clarithromycin] Other (See Comments)     Pt unsure     Pcn [penicillins]     Metformin Rash     Rash over body        Objective:     Physical Exam  Vitals and nursing note reviewed.   Constitutional:       Appearance: He is well-developed.   HENT:      Head: Normocephalic and atraumatic.   Eyes:      Conjunctiva/sclera: Conjunctivae normal.      Pupils: Pupils are equal, round, and reactive to light.   Cardiovascular:      Rate and Rhythm: Normal rate and regular rhythm.      Pulses: Intact distal pulses.      Heart sounds: Normal heart sounds.   Pulmonary:      Effort: Pulmonary effort is normal.      Breath sounds: Normal breath sounds.   Abdominal:      General: Bowel sounds are normal.      Palpations: Abdomen is soft.   Musculoskeletal:      Cervical back: Normal range of motion and neck supple.   Skin:     General: Skin is warm and dry.   Neurological:      Mental Status: He is alert and oriented to person, place, and time.         Assessment:     1. Paroxysmal atrial fibrillation    2. Chronic anticoagulation    3. BMI 40.0-44.9, adult        Plan:     Paroxysmal atrial fibrillation    Chronic anticoagulation    BMI 40.0-44.9, adult    Continue eliquis, metoprolol- afib

## 2024-05-29 ENCOUNTER — TELEPHONE (OUTPATIENT)
Dept: CARDIOLOGY | Facility: CLINIC | Age: 79
End: 2024-05-29
Payer: MEDICARE

## 2024-05-29 NOTE — TELEPHONE ENCOUNTER
Followed up with Trip, patient  will call back with more information on procedure once available.

## 2024-05-29 NOTE — TELEPHONE ENCOUNTER
----- Message from Ermelinda Stover sent at 5/29/2024  3:12 PM CDT -----  Regarding: pre op clearance  Name of who is calling:   Trip      What is the request in detail: Pt is requesting a call back in ref to getting pre op clearance for gallbladder removal surgery      Can the clinic reply by [facility]:yes      What number to call back if not [facility]: [phone]

## 2024-07-22 ENCOUNTER — TELEPHONE (OUTPATIENT)
Dept: CARDIOLOGY | Facility: CLINIC | Age: 79
End: 2024-07-22
Payer: MEDICARE

## 2024-07-22 NOTE — TELEPHONE ENCOUNTER
Followed up with Trip rescheduled appointment for patient. Patient verbalized understanding of date, time, and location of appointment.

## 2024-07-22 NOTE — TELEPHONE ENCOUNTER
----- Message from Marcia Chivo sent at 7/22/2024  3:24 PM CDT -----  FYI Patient phoned to inform our office that he has had PreOp Appt and 6 mo Fu with Aubrey in [place] no need to see him in [place].  Thank/alvaro

## 2024-09-16 ENCOUNTER — OFFICE VISIT (OUTPATIENT)
Dept: CARDIOLOGY | Facility: CLINIC | Age: 79
End: 2024-09-16
Payer: MEDICARE

## 2024-09-16 VITALS
HEIGHT: 70 IN | DIASTOLIC BLOOD PRESSURE: 78 MMHG | SYSTOLIC BLOOD PRESSURE: 138 MMHG | BODY MASS INDEX: 39.42 KG/M2 | OXYGEN SATURATION: 100 % | HEART RATE: 56 BPM | RESPIRATION RATE: 16 BRPM | WEIGHT: 275.38 LBS

## 2024-09-16 DIAGNOSIS — E78.00 PURE HYPERCHOLESTEROLEMIA: Chronic | ICD-10-CM

## 2024-09-16 DIAGNOSIS — I48.0 PAROXYSMAL ATRIAL FIBRILLATION: Primary | ICD-10-CM

## 2024-09-16 DIAGNOSIS — Z79.01 CHRONIC ANTICOAGULATION: ICD-10-CM

## 2024-09-16 PROCEDURE — 3078F DIAST BP <80 MM HG: CPT | Mod: CPTII,S$GLB,, | Performed by: INTERNAL MEDICINE

## 2024-09-16 PROCEDURE — 3075F SYST BP GE 130 - 139MM HG: CPT | Mod: CPTII,S$GLB,, | Performed by: INTERNAL MEDICINE

## 2024-09-16 PROCEDURE — 99214 OFFICE O/P EST MOD 30 MIN: CPT | Mod: S$GLB,,, | Performed by: INTERNAL MEDICINE

## 2024-09-16 PROCEDURE — 3288F FALL RISK ASSESSMENT DOCD: CPT | Mod: CPTII,S$GLB,, | Performed by: INTERNAL MEDICINE

## 2024-09-16 PROCEDURE — 1159F MED LIST DOCD IN RCRD: CPT | Mod: CPTII,S$GLB,, | Performed by: INTERNAL MEDICINE

## 2024-09-16 PROCEDURE — 99999 PR PBB SHADOW E&M-EST. PATIENT-LVL IV: CPT | Mod: PBBFAC,,, | Performed by: INTERNAL MEDICINE

## 2024-09-16 PROCEDURE — 1160F RVW MEDS BY RX/DR IN RCRD: CPT | Mod: CPTII,S$GLB,, | Performed by: INTERNAL MEDICINE

## 2024-09-16 PROCEDURE — 1101F PT FALLS ASSESS-DOCD LE1/YR: CPT | Mod: CPTII,S$GLB,, | Performed by: INTERNAL MEDICINE

## 2024-09-16 RX ORDER — AMIODARONE HYDROCHLORIDE 200 MG/1
TABLET ORAL DAILY
COMMUNITY

## 2024-09-16 NOTE — PROGRESS NOTES
Subjective:   Patient ID:  Trip Prasad is a 78 y.o. male who presents for follow-up of No chief complaint on file.  Pt with admission for cholelithiasis and cholecystectomy .[facility]ANDA/D[facility]V at [facility] after that.  Patient denies CP, angina or anginal equivalent.  Hypertension  This is a chronic problem. The current episode started more than 1 year ago. The problem has been gradually improving since onset. The problem is controlled. Pertinent negatives include no chest pain, palpitations or shortness of breath. Past treatments include beta blockers. The current treatment provides moderate improvement. There are no compliance problems.    Atrial Fibrillation  Presents for follow-up visit. Symptoms are negative for bradycardia, chest pain, dizziness, palpitations, shortness of breath, syncope, tachycardia and weakness. The symptoms have been stable. There are no medication compliance problems.       Review of Systems   Constitutional: Negative. Negative for weight gain.   HENT: Negative.     Eyes: Negative.    Cardiovascular: Negative.  Negative for chest pain, leg swelling, palpitations and syncope.   Respiratory: Negative.  Negative for shortness of breath.    Endocrine: Negative.    Hematologic/Lymphatic: Negative.    Skin: Negative.    Musculoskeletal:  Negative for muscle weakness.   Gastrointestinal: Negative.    Genitourinary: Negative.    Neurological: Negative.  Negative for dizziness and weakness.   Psychiatric/Behavioral: Negative.     Allergic/Immunologic: Negative.    All other systems reviewed and are negative.    Family History   Problem Relation Name Age of Onset    Cancer Maternal Aunt      Heart disease Maternal Aunt      Heart disease Maternal Grandfather      Cancer Maternal Uncle       Past Medical History:   Diagnosis Date    Atrial fibrillation     Diabetes mellitus, type 2 1/2014    Glaucoma 2012    Hiatal hernia     Hyperlipidemia     Loss of hearing     Hearing aids 6/12 bilateral    Obesity (BMI  "30-39.9)      Social History     Socioeconomic History    Marital status:     Number of children: 1   Occupational History    Occupation:      Comment: [employer] started [employer] for   Tobacco Use    Smoking status: Never    Smokeless tobacco: Never   Substance and Sexual Activity    Alcohol use: Yes     Alcohol/week: 0.0 standard drinks of alcohol     Comment: " very little"    Drug use: No    Sexual activity: Yes     Partners: Female   Social History Narrative     3 kids, and      Social Determinants of Health     Financial Resource Strain: Unknown (11/8/2018)    Received from [facility] and Its Subsidiaries and Affiliates, [facility] and Its Subsidiaries and Affiliates    Overall Financial Resource Strain (CARDIA)     Difficulty of Paying Living Expenses: Patient declined   Food Insecurity: No Food Insecurity (7/31/2024)    Received from [facility] and Its Subsidiaries and Affiliates    Hunger Vital Sign     Worried About Running Out of Food in the Last Year: Never true     Ran Out of Food in the Last Year: Never true   Transportation Needs: No Transportation Needs (7/31/2024)    Received from [facility] and Its Subsidiaries and Affiliates    PRAPARE - Transportation     Lack of Transportation (Medical): No     Lack of Transportation (Non-Medical): No   Physical Activity: Unknown (11/8/2018)    Received from [facility] and Its Subsidiaries and Affiliates, [facility]    Exercise Vital Sign     Days of Exercise per Week: Patient declined     Minutes of Exercise per Session: Patient declined   Stress: Unknown (11/8/2018)    Received from [facility] and Its Subsidiaries and " Affiliates, [facility] and Its Subsidiaries and Affiliates    [place] [place] [employer] - Occupational Stress Questionnaire     Feeling of Stress : Patient declined   Housing Stability: Low Risk  (7/31/2024)    Received from [facility] and Its Subsidiaries and Affiliates    Housing Stability Vital Sign     Unable to Pay for Housing in the Last Year: No     Number of Times Moved in the Last Year: 1     Homeless in the Last Year: No     Current Outpatient Medications on File Prior to Visit   Medication Sig Dispense Refill    amiodarone (PACERONE) 200 MG Tab Take by mouth once daily.      apixaban (ELIQUIS) 5 mg Tab Take 1 tablet (5 mg total) by mouth 2 (two) times daily. 60 tablet 11    ascorbic acid, vitamin C, (VITAMIN C) 1000 MG tablet Take 1,000 mg by mouth once daily.      b complex vitamins tablet Take 1 tablet by mouth once daily.      bimatoprost (LUMIGAN) 0.03 % ophthalmic drops 1 drop every evening.      blood sugar diagnostic Strp 1 strip by Misc.(Non-Drug; Combo Route) route once daily. 100 strip 3    brimonidine 0.15 % OPTH DROP (ALPHAGAN) 0.15 % ophthalmic solution Alphagan P 0.15 % eye drops      brinzolamide-brimonidine 1-0.2 % DrpS Apply to eye.      dorzolamide (TRUSOPT) 2 % ophthalmic solution 1 drop 3 (three) times daily.      L.acid/B.animalis,bifid,infant (FORTIFY OPTIMA PROBIOTIC ORAL) Take by mouth.      metoprolol tartrate (LOPRESSOR) 25 MG tablet TAKE 1 TABLET BY MOUTH TWICE DAILY 90 tablet 0    montelukast (SINGULAIR) 10 mg tablet Take 10 mg by mouth every evening.      ONETOUCH DELICA LANCETS 33 gauge Misc USE TO CHECK BLOOD SUGAR TWICE DAILY  5    azelastine HCl (AZELASTINE OPHT) Apply 1 drop to eye 2 (two) times daily. (Patient not taking: Reported on 9/16/2024)      loratadine (CLARITIN) 10 mg tablet Take 10 mg by mouth daily as needed. (Patient not taking: Reported on 9/16/2024)       omega-3s/dha/epa/fish oil/D3 (VITAMIN-D + OMEGA-3 ORAL) Take by mouth. (Patient not taking: Reported on 9/16/2024)       No current facility-administered medications on file prior to visit.     Review of patient's allergies indicates:   Allergen Reactions    Biaxin [clarithromycin] Other (See Comments)     Pt unsure     Pcn [penicillins]     Metformin Rash     Rash over body        Objective:     Physical Exam  Vitals and nursing note reviewed.   Constitutional:       Appearance: He is well-developed.   HENT:      Head: Normocephalic and atraumatic.   Eyes:      Conjunctiva/sclera: Conjunctivae normal.      Pupils: Pupils are equal, round, and reactive to light.   Cardiovascular:      Rate and Rhythm: Normal rate and regular rhythm.      Pulses: Intact distal pulses.      Heart sounds: Normal heart sounds.   Pulmonary:      Effort: Pulmonary effort is normal.      Breath sounds: Normal breath sounds.   Abdominal:      General: Bowel sounds are normal.      Palpations: Abdomen is soft.   Musculoskeletal:      Cervical back: Normal range of motion and neck supple.   Skin:     General: Skin is warm and dry.   Neurological:      Mental Status: He is alert and oriented to person, place, and time.         Assessment:     1. Paroxysmal atrial fibrillation    2. Pure hypercholesterolemia    3. Chronic anticoagulation    4. BMI 39.0-39.9,adult        Plan:     Paroxysmal atrial fibrillation    Pure hypercholesterolemia    Chronic anticoagulation    BMI 39.0-39.9,adult      Continue eliquis, metoprolol- afib   Decrease amiodarone to q day

## 2024-12-09 ENCOUNTER — OFFICE VISIT (OUTPATIENT)
Dept: CARDIOLOGY | Facility: CLINIC | Age: 79
End: 2024-12-09
Payer: MEDICARE

## 2024-12-09 VITALS
BODY MASS INDEX: 40.38 KG/M2 | OXYGEN SATURATION: 95 % | WEIGHT: 281.44 LBS | SYSTOLIC BLOOD PRESSURE: 126 MMHG | HEART RATE: 73 BPM | DIASTOLIC BLOOD PRESSURE: 70 MMHG

## 2024-12-09 DIAGNOSIS — I10 PRIMARY HYPERTENSION: ICD-10-CM

## 2024-12-09 DIAGNOSIS — Z79.01 CHRONIC ANTICOAGULATION: ICD-10-CM

## 2024-12-09 DIAGNOSIS — I48.0 PAROXYSMAL ATRIAL FIBRILLATION: ICD-10-CM

## 2024-12-09 DIAGNOSIS — E78.00 PURE HYPERCHOLESTEROLEMIA: Primary | Chronic | ICD-10-CM

## 2024-12-09 PROCEDURE — 99999 PR PBB SHADOW E&M-EST. PATIENT-LVL III: CPT | Mod: PBBFAC,,, | Performed by: INTERNAL MEDICINE

## 2024-12-09 PROCEDURE — 99214 OFFICE O/P EST MOD 30 MIN: CPT | Mod: S$GLB,,, | Performed by: INTERNAL MEDICINE

## 2024-12-09 PROCEDURE — 3078F DIAST BP <80 MM HG: CPT | Mod: CPTII,S$GLB,, | Performed by: INTERNAL MEDICINE

## 2024-12-09 PROCEDURE — 1159F MED LIST DOCD IN RCRD: CPT | Mod: CPTII,S$GLB,, | Performed by: INTERNAL MEDICINE

## 2024-12-09 PROCEDURE — 3288F FALL RISK ASSESSMENT DOCD: CPT | Mod: CPTII,S$GLB,, | Performed by: INTERNAL MEDICINE

## 2024-12-09 PROCEDURE — 3074F SYST BP LT 130 MM HG: CPT | Mod: CPTII,S$GLB,, | Performed by: INTERNAL MEDICINE

## 2024-12-09 PROCEDURE — 1160F RVW MEDS BY RX/DR IN RCRD: CPT | Mod: CPTII,S$GLB,, | Performed by: INTERNAL MEDICINE

## 2024-12-09 PROCEDURE — 1101F PT FALLS ASSESS-DOCD LE1/YR: CPT | Mod: CPTII,S$GLB,, | Performed by: INTERNAL MEDICINE

## 2024-12-09 RX ORDER — METOPROLOL SUCCINATE 100 MG/1
100 TABLET, EXTENDED RELEASE ORAL DAILY
Qty: 90 TABLET | Refills: 3 | Status: SHIPPED | OUTPATIENT
Start: 2024-12-09 | End: 2025-12-09

## 2024-12-09 RX ORDER — AMIODARONE HYDROCHLORIDE 200 MG/1
200 TABLET ORAL DAILY
Qty: 90 TABLET | Refills: 3 | Status: SHIPPED | OUTPATIENT
Start: 2024-12-09

## 2024-12-09 NOTE — PROGRESS NOTES
Subjective:   Patient ID:  Trip Prasad is a 79 y.o. male who presents for follow-up of No chief complaint on file.  Patient denies CP, angina or anginal equivalent.  Pt active    Hypertension  This is a chronic problem. The current episode started more than 1 year ago. The problem has been gradually improving since onset. The problem is controlled. Pertinent negatives include no chest pain, palpitations or shortness of breath. Past treatments include beta blockers. The current treatment provides moderate improvement. There are no compliance problems.    Atrial Fibrillation  Presents for follow-up visit. Symptoms are negative for bradycardia, chest pain, dizziness, palpitations, shortness of breath, syncope, tachycardia and weakness. The symptoms have been stable. There are no medication compliance problems.       Review of Systems   Constitutional: Negative. Negative for weight gain.   HENT: Negative.     Eyes: Negative.    Cardiovascular: Negative.  Negative for chest pain, leg swelling, palpitations and syncope.   Respiratory: Negative.  Negative for shortness of breath.    Endocrine: Negative.    Hematologic/Lymphatic: Negative.    Skin: Negative.    Musculoskeletal:  Negative for muscle weakness.   Gastrointestinal: Negative.    Genitourinary: Negative.    Neurological: Negative.  Negative for dizziness and weakness.   Psychiatric/Behavioral: Negative.     Allergic/Immunologic: Negative.    All other systems reviewed and are negative.    Family History   Problem Relation Name Age of Onset    Cancer Maternal Aunt      Heart disease Maternal Aunt      Heart disease Maternal Grandfather      Cancer Maternal Uncle       Past Medical History:   Diagnosis Date    Atrial fibrillation     Diabetes mellitus, type 2 1/2014    Glaucoma 2012    Hiatal hernia     Hyperlipidemia     Loss of hearing     Hearing aids 6/12 bilateral    Obesity (BMI 30-39.9)      Social History     Socioeconomic History    Marital status:  "    Number of children: 1   Occupational History    Occupation:      Comment: [employer] started 1990 for   Tobacco Use    Smoking status: Never    Smokeless tobacco: Never   Substance and Sexual Activity    Alcohol use: Yes     Alcohol/week: 0.0 standard drinks of alcohol     Comment: " very little"    Drug use: No    Sexual activity: Yes     Partners: Female   Social History Narrative     3 kids, and      Social Drivers of Health     Financial Resource Strain: Unknown (11/8/2018)    [facility], [facility] and Its Subsidiaries and Affiliates    Overall Financial Resource Strain (CARDIA)     Difficulty of Paying Living Expenses: Patient declined   Food Insecurity: No Food Insecurity (7/31/2024)    [facility]    Hunger Vital Sign     Worried About Running Out of Food in the Last Year: Never true     Ran Out of Food in the Last Year: Never true   Transportation Needs: No Transportation Needs (7/31/2024)    Received from [facility] and Its Subsidiaries and Affiliates    PRAPARE - Transportation     Lack of Transportation (Medical): No     Lack of Transportation (Non-Medical): No   Physical Activity: Unknown (11/8/2018)    Received from [facility] Its Subsidiaries and Affiliates, [facility] and Its Subsidiaries and Affiliates    Exercise Vital Sign     Days of Exercise per Week: Patient declined     Minutes of Exercise per Session: Patient declined   Stress: Unknown (11/8/2018)    [facility], [facility] and Its Subsidiaries " and Affiliates    [place] [employer] - Occupational Stress Questionnaire     Feeling of Stress : Patient declined   Housing Stability: Low Risk  (7/31/2024)    [facility] from [facility] and Its Subsidiaries and Affiliates    Housing Stability Vital Sign     Unable to Pay for Housing in the Last Year: No     Number of Times Moved in the Last Year: 1     Homeless in the Last Year: No     Current Outpatient Medications on File Prior to Visit   Medication Sig Dispense Refill    amiodarone (PACERONE) 200 MG Tab Take by mouth once daily.      apixaban (ELIQUIS) 5 mg Tab Take 1 tablet (5 mg total) by mouth 2 (two) times daily. 60 tablet 11    ascorbic acid, vitamin C, (VITAMIN C) 1000 MG tablet Take 1,000 mg by mouth once daily.      bimatoprost (LUMIGAN) 0.03 % ophthalmic drops 1 drop every evening.      blood sugar diagnostic Strp 1 strip by Misc.(Non-Drug; Combo Route) route once daily. 100 strip 3    brinzolamide-brimonidine 1-0.2 % DrpS Apply to eye.      L.acid/B.animalis,bifid,infant (FORTIFY OPTIMA PROBIOTIC ORAL) Take by mouth.      metoprolol tartrate (LOPRESSOR) 25 MG tablet TAKE 1 TABLET BY MOUTH TWICE DAILY 90 tablet 0    ONETOUCH DELICA LANCETS 33 gauge Misc USE TO CHECK BLOOD SUGAR TWICE DAILY  5    azelastine HCl (AZELASTINE OPHT) Apply 1 drop to eye 2 (two) times daily. (Patient not taking: Reported on 12/9/2024)      b complex vitamins tablet Take 1 tablet by mouth once daily. (Patient not taking: Reported on 12/9/2024)      brimonidine 0.15 % OPTH DROP (ALPHAGAN) 0.15 % ophthalmic solution Alphagan P 0.15 % eye drops (Patient not taking: Reported on 12/9/2024)      dorzolamide (TRUSOPT) 2 % ophthalmic solution 1 drop 3 (three) times daily. (Patient not taking: Reported on 12/9/2024)      loratadine (CLARITIN) 10 mg tablet Take 10 mg by mouth daily as needed. (Patient not taking: Reported on 12/9/2024)      montelukast (SINGULAIR) 10 mg tablet  Take 10 mg by mouth every evening. (Patient not taking: Reported on 12/9/2024)      omega-3s/dha/epa/fish oil/D3 (VITAMIN-D + OMEGA-3 ORAL) Take by mouth. (Patient not taking: Reported on 12/9/2024)       No current facility-administered medications on file prior to visit.     Review of patient's allergies indicates:   Allergen Reactions    Biaxin [clarithromycin] Other (See Comments)     Pt unsure     Pcn [penicillins]     Metformin Rash     Rash over body        Objective:     Physical Exam  Vitals and nursing note reviewed.   Constitutional:       Appearance: He is well-developed.   HENT:      Head: Normocephalic and atraumatic.   Eyes:      Conjunctiva/sclera: Conjunctivae normal.      Pupils: Pupils are equal, round, and reactive to light.   Cardiovascular:      Rate and Rhythm: Normal rate and regular rhythm.      Pulses: Intact distal pulses.      Heart sounds: Normal heart sounds.   Pulmonary:      Effort: Pulmonary effort is normal.      Breath sounds: Normal breath sounds.   Abdominal:      General: Bowel sounds are normal.      Palpations: Abdomen is soft.   Musculoskeletal:      Cervical back: Normal range of motion and neck supple.   Skin:     General: Skin is warm and dry.   Neurological:      Mental Status: He is alert and oriented to person, place, and time.         Assessment:     1. Pure hypercholesterolemia    2. Paroxysmal atrial fibrillation    3. BMI 40.0-44.9, adult    4. Chronic anticoagulation        Plan:     Pure hypercholesterolemia    Paroxysmal atrial fibrillation    BMI 40.0-44.9, adult    Chronic anticoagulation      Continue eliquis, metoprolol, amiodarone- afib /htn

## 2025-02-21 ENCOUNTER — TELEPHONE (OUTPATIENT)
Dept: CARDIOLOGY | Facility: CLINIC | Age: 80
End: 2025-02-21
Payer: MEDICARE

## 2025-02-21 NOTE — TELEPHONE ENCOUNTER
Dr. Lucas at [facility] is requesting clearance for patient  Procedure: colonoscopy  Date of procedure: 03/26/2025  Please include instructions on length of time patient can hold Eliquis prior to the procedure.

## 2025-02-24 NOTE — TELEPHONE ENCOUNTER
----- Message from Audra sent at 2/24/2025 10:52 AM CST -----  Contact: Mobile  [phone]  Requesting an RX refill or new RX.Is this a refill or new RX: RefillRX name and strength apixaban (ELIQUIS) 5 mg TabIs this a 30 day or 90 day RX: 5 PillsPharmacy name and phone # [facility] [place] [address] [place] [address]Skfsb: [phone] Fax: [phone] doctors have asked that we provide their patients with the following 2 reminders -- prescription refills can take up to 72 hours, and a friendly reminder that in the future you can use your [facility] account to request refills: Comment: Patient would like to know if you have any samples of the apixaban (ELIQUIS) 5 mg Tab? Patient does not have insurance.

## 2025-02-24 NOTE — TELEPHONE ENCOUNTER
----- Message from Dash sent at 2/24/2025  1:03 PM CST -----  .Type: Patient Call Back  Who called: patient  What is the request in detail:  Called in concerning medication refill for apixaban (ELIQUIS) 5 mg Tab . Patient is completely out and is needing about 5 tablets called because insurance will not take into effect until 3/01 . Patient is wanting to know if provider has any samples that he can spare until insurance has kicked in patient states he has called earlier but hasn't heard anything back . Patient is needing to get an answer asap so he can make his way to the pharmacy .  . Please call back Can the clinic reply by [facility]?     Would the patient rather a call back or a response via [facility]? call  Best call back number:.[phone]

## 2025-06-09 ENCOUNTER — OFFICE VISIT (OUTPATIENT)
Dept: CARDIOLOGY | Facility: CLINIC | Age: 80
End: 2025-06-09
Payer: MEDICARE

## 2025-06-09 VITALS
BODY MASS INDEX: 41.52 KG/M2 | RESPIRATION RATE: 16 BRPM | WEIGHT: 290 LBS | SYSTOLIC BLOOD PRESSURE: 123 MMHG | HEART RATE: 68 BPM | HEIGHT: 70 IN | DIASTOLIC BLOOD PRESSURE: 72 MMHG | OXYGEN SATURATION: 95 %

## 2025-06-09 DIAGNOSIS — I48.0 PAROXYSMAL ATRIAL FIBRILLATION: ICD-10-CM

## 2025-06-09 DIAGNOSIS — E78.2 MIXED HYPERLIPIDEMIA: Chronic | ICD-10-CM

## 2025-06-09 DIAGNOSIS — I10 PRIMARY HYPERTENSION: Primary | ICD-10-CM

## 2025-06-09 PROCEDURE — 1160F RVW MEDS BY RX/DR IN RCRD: CPT | Mod: CPTII,S$GLB,, | Performed by: INTERNAL MEDICINE

## 2025-06-09 PROCEDURE — 99214 OFFICE O/P EST MOD 30 MIN: CPT | Mod: S$GLB,,, | Performed by: INTERNAL MEDICINE

## 2025-06-09 PROCEDURE — 99999 PR PBB SHADOW E&M-EST. PATIENT-LVL IV: CPT | Mod: PBBFAC,,, | Performed by: INTERNAL MEDICINE

## 2025-06-09 PROCEDURE — 3074F SYST BP LT 130 MM HG: CPT | Mod: CPTII,S$GLB,, | Performed by: INTERNAL MEDICINE

## 2025-06-09 PROCEDURE — 3288F FALL RISK ASSESSMENT DOCD: CPT | Mod: CPTII,S$GLB,, | Performed by: INTERNAL MEDICINE

## 2025-06-09 PROCEDURE — 1159F MED LIST DOCD IN RCRD: CPT | Mod: CPTII,S$GLB,, | Performed by: INTERNAL MEDICINE

## 2025-06-09 PROCEDURE — 1101F PT FALLS ASSESS-DOCD LE1/YR: CPT | Mod: CPTII,S$GLB,, | Performed by: INTERNAL MEDICINE

## 2025-06-09 PROCEDURE — 3078F DIAST BP <80 MM HG: CPT | Mod: CPTII,S$GLB,, | Performed by: INTERNAL MEDICINE

## 2025-06-09 NOTE — PROGRESS NOTES
Subjective:   Patient ID:  Trip Prasad is a 79 y.o. male who presents for follow-up of No chief complaint on file.  Patient denies CP, angina or anginal equivalent or palpitations.  Pt active no sx.  AMANDA/DCCV OLOL 8-24 ( nml EF)    Hypertension  This is a chronic problem. The current episode started more than 1 year ago. The problem has been gradually improving since onset. The problem is controlled. Pertinent negatives include no chest pain, palpitations or shortness of breath. Past treatments include beta blockers. The current treatment provides moderate improvement. There are no compliance problems.    Atrial Fibrillation  Presents for follow-up visit. Symptoms are negative for bradycardia, chest pain, dizziness, palpitations, shortness of breath, syncope, tachycardia and weakness. The symptoms have been stable. There are no medication compliance problems.       Review of Systems   Constitutional: Negative. Negative for weight gain.   HENT: Negative.     Eyes: Negative.    Cardiovascular: Negative.  Negative for chest pain, leg swelling, palpitations and syncope.   Respiratory: Negative.  Negative for shortness of breath.    Endocrine: Negative.    Hematologic/Lymphatic: Negative.    Skin: Negative.    Musculoskeletal:  Negative for muscle weakness.   Gastrointestinal: Negative.    Genitourinary: Negative.    Neurological: Negative.  Negative for dizziness and weakness.   Psychiatric/Behavioral: Negative.     Allergic/Immunologic: Negative.    All other systems reviewed and are negative.    Family History   Problem Relation Name Age of Onset    Cancer Maternal Aunt      Heart disease Maternal Aunt      Heart disease Maternal Grandfather      Cancer Maternal Uncle       Past Medical History:   Diagnosis Date    Atrial fibrillation     Diabetes mellitus, type 2 1/2014    Glaucoma 2012    Hiatal hernia     Hyperlipidemia     Loss of hearing     Hearing aids 6/12 bilateral    Obesity (BMI 30-39.9)      Social  "History[1]  Medications Ordered Prior to Encounter[2]  Review of patient's allergies indicates:   Allergen Reactions    Biaxin [clarithromycin] Other (See Comments)     Pt unsure     Pcn [penicillins]     Metformin Rash     Rash over body        Objective:     Physical Exam  Vitals and nursing note reviewed.   Constitutional:       Appearance: He is well-developed.   HENT:      Head: Normocephalic and atraumatic.   Eyes:      Conjunctiva/sclera: Conjunctivae normal.      Pupils: Pupils are equal, round, and reactive to light.   Cardiovascular:      Rate and Rhythm: Normal rate and regular rhythm.      Pulses: Intact distal pulses.      Heart sounds: Normal heart sounds.   Pulmonary:      Effort: Pulmonary effort is normal.      Breath sounds: Normal breath sounds.   Abdominal:      General: Bowel sounds are normal.      Palpations: Abdomen is soft.   Musculoskeletal:      Cervical back: Normal range of motion and neck supple.   Skin:     General: Skin is warm and dry.   Neurological:      Mental Status: He is alert and oriented to person, place, and time.         Assessment:     1. Primary hypertension    2. Mixed hyperlipidemia    3. Paroxysmal atrial fibrillation        Plan:     Primary hypertension    Mixed hyperlipidemia    Paroxysmal atrial fibrillation        Continue eliquis, metoprolol, - afib /htn   Hold amiodarone monior clinically         [1]   Social History  Socioeconomic History    Marital status:     Number of children: 1   Occupational History    Occupation:      Comment: [employer] started 1990 for   Tobacco Use    Smoking status: Never    Smokeless tobacco: Never   Substance and Sexual Activity    Alcohol use: Yes     Alcohol/week: 0.0 standard drinks of alcohol     Comment: " very little"    Drug use: No    Sexual activity: Yes     Partners: Female   Social History Narrative     3 kids, and      Social Drivers of Health     Financial Resource Strain: Unknown " (11/8/2018)    [facility]    Overall Financial Resource Strain (CARDIA)     Difficulty of Paying Living Expenses: Patient declined   Food Insecurity: No Food Insecurity (7/31/2024)    Received from [facility] and Its Subsidiaries and Affiliates    Hunger Vital Sign     Worried About Running Out of Food in the Last Year: Never true     Ran Out of Food in the Last Year: Never true   Transportation Needs: No Transportation Needs (7/31/2024)    [facility]    PRAPARE - Transportation     Lack of Transportation (Medical): No     Lack of Transportation (Non-Medical): No   Physical Activity: Unknown (11/8/2018)    [facility]    Exercise Vital Sign     Days of Exercise per Week: Patient declined     Minutes of Exercise per Session: Patient declined   Stress: Unknown (11/8/2018)    Received from [facility] and Its Subsidiaries and Affiliates    [place] [employer] - Occupational Stress Questionnaire     Feeling of Stress : Patient declined   Housing Stability: Low Risk  (7/31/2024)    [facility]    Housing Stability Vital Sign     Unable to Pay for Housing in the Last Year: No     Number of Times Moved in the Last Year: 1     Homeless in the Last Year: No   [2]   Current Outpatient Medications on File Prior to Visit   Medication Sig Dispense Refill    amiodarone (PACERONE) 200 MG Tab Take 1 tablet (200 mg total) by mouth once daily. 90 tablet 3    apixaban (ELIQUIS) 5 mg Tab Take 1 tablet (5 mg total) by mouth 2 (two) times daily. 60 tablet 11    ascorbic acid, vitamin C,  (VITAMIN C) 1000 MG tablet Take 1,000 mg by mouth once daily.      b complex vitamins tablet Take 1 tablet by mouth once daily.      bimatoprost (LUMIGAN) 0.03 % ophthalmic drops 1 drop every evening.      blood sugar diagnostic Strp 1 strip by Misc.(Non-Drug; Combo Route) route once daily. 100 strip 3    dorzolamide (TRUSOPT) 2 % ophthalmic solution 1 drop 3 (three) times daily.      L.acid/B.animalis,bifid,infant (FORTIFY OPTIMA PROBIOTIC ORAL) Take by mouth.      metoprolol succinate (TOPROL-XL) 100 MG 24 hr tablet Take 1 tablet (100 mg total) by mouth once daily. 90 tablet 3    ONETOUCH DELICA LANCETS 33 gauge Misc USE TO CHECK BLOOD SUGAR TWICE DAILY  5    apixaban (ELIQUIS) 5 mg Tab Take 1 tablet by mouth twice daily 60 tablet 11    azelastine HCl (AZELASTINE OPHT) Apply 1 drop to eye 2 (two) times daily. (Patient not taking: Reported on 9/16/2024)      brimonidine 0.15 % OPTH DROP (ALPHAGAN) 0.15 % ophthalmic solution Alphagan P 0.15 % eye drops (Patient not taking: Reported on 6/9/2025)      brinzolamide-brimonidine 1-0.2 % DrpS Apply to eye. (Patient not taking: Reported on 6/9/2025)      loratadine (CLARITIN) 10 mg tablet Take 10 mg by mouth daily as needed. (Patient not taking: Reported on 9/16/2024)      montelukast (SINGULAIR) 10 mg tablet Take 10 mg by mouth every evening. (Patient not taking: Reported on 6/9/2025)      omega-3s/dha/epa/fish oil/D3 (VITAMIN-D + OMEGA-3 ORAL) Take by mouth. (Patient not taking: Reported on 9/16/2024)       No current facility-administered medications on file prior to visit.